# Patient Record
Sex: FEMALE | Race: WHITE | Employment: OTHER | URBAN - METROPOLITAN AREA
[De-identification: names, ages, dates, MRNs, and addresses within clinical notes are randomized per-mention and may not be internally consistent; named-entity substitution may affect disease eponyms.]

---

## 2017-11-27 PROBLEM — R29.90 STROKE-LIKE SYMPTOM: Status: ACTIVE | Noted: 2017-11-27

## 2017-11-28 PROBLEM — I63.211 ACUTE ISCHEMIC VERTEBROBASILAR ARTERY BRAINSTEM STROKE INVOLVING RIGHT-SIDED VESSEL (HCC): Status: ACTIVE | Noted: 2017-11-28

## 2017-11-28 PROBLEM — I63.9 ACUTE ISCHEMIC STROKE (HCC): Status: ACTIVE | Noted: 2017-11-28

## 2017-11-28 PROBLEM — I63.22 ACUTE ISCHEMIC VERTEBROBASILAR ARTERY BRAINSTEM STROKE INVOLVING RIGHT-SIDED VESSEL (HCC): Status: ACTIVE | Noted: 2017-11-28

## 2018-07-16 NOTE — PROGRESS NOTES
Patient and  attended preoperative Total Joint Camp on 7/16/2018. Patient is scheduled to have an elective knee replacement. Patient was educated regarding Disease Process, Medications, Smoking Cessation, Oxygenation, Incentive Spirometry and Deep Breath and Cough, signs and symptoms of postoperative joint infection that include: Fever, Chills, Pain Control, Drainage and Redness, post-op follow up with orthopaedic surgeon, dressing removal, steri strips, ambulatory devices which include a standard walker and cane, bed mobility, correct anatomical alignment, active range of motion, proper transferring technique, incision care, infection prevention measures, non-pharmacologic comfort measures, notification of inadequate pain control measures, pain scale for assessing level of pain, pharmacologic pain management, relaxation techniques. [address] also included patient and family watching an educational total knee replacement video and visual examples of mobility training postoperative by a physical therapist and orthopaedic navigator.

## 2018-07-20 ENCOUNTER — ANESTHESIA EVENT (OUTPATIENT)
Dept: OPERATING ROOM | Age: 78
DRG: 470 | End: 2018-07-20
Payer: MEDICARE

## 2018-07-20 ENCOUNTER — HOSPITAL ENCOUNTER (OUTPATIENT)
Dept: PREADMISSION TESTING | Age: 78
Discharge: HOME | End: 2018-07-20
Payer: MEDICARE

## 2018-07-20 VITALS
WEIGHT: 172 LBS | TEMPERATURE: 97.8 F | DIASTOLIC BLOOD PRESSURE: 76 MMHG | OXYGEN SATURATION: 94 % | RESPIRATION RATE: 18 BRPM | SYSTOLIC BLOOD PRESSURE: 140 MMHG | HEART RATE: 69 BPM | HEIGHT: 62 IN | BODY MASS INDEX: 31.65 KG/M2

## 2018-07-20 DIAGNOSIS — M17.11 OSTEOARTHRITIS OF RIGHT KNEE, UNSPECIFIED OSTEOARTHRITIS TYPE: Primary | ICD-10-CM

## 2018-07-20 LAB
ABO/RH: NORMAL
ANION GAP SERPL CALCULATED.3IONS-SCNC: 9 MMOL/L (ref 7–16)
ANTIBODY SCREEN: NORMAL
APTT: 31.4 SEC (ref 24.5–35.1)
BUN BLDV-MCNC: 9 MG/DL (ref 8–23)
CALCIUM SERPL-MCNC: 8.9 MG/DL (ref 8.6–10.2)
CHLORIDE BLD-SCNC: 104 MMOL/L (ref 98–107)
CO2: 30 MMOL/L (ref 22–29)
CREAT SERPL-MCNC: 0.6 MG/DL (ref 0.5–1)
EKG ATRIAL RATE: 65 BPM
EKG P AXIS: 79 DEGREES
EKG P-R INTERVAL: 190 MS
EKG Q-T INTERVAL: 438 MS
EKG QRS DURATION: 76 MS
EKG QTC CALCULATION (BAZETT): 455 MS
EKG R AXIS: 43 DEGREES
EKG T AXIS: 16 DEGREES
EKG VENTRICULAR RATE: 65 BPM
GFR AFRICAN AMERICAN: >60
GFR NON-AFRICAN AMERICAN: >60 ML/MIN/1.73
GLUCOSE BLD-MCNC: 77 MG/DL (ref 74–109)
HCT VFR BLD CALC: 38.9 % (ref 34–48)
HEMOGLOBIN: 12.8 G/DL (ref 11.5–15.5)
INR BLD: 1.6
MAGNESIUM: 1.8 MG/DL (ref 1.6–2.6)
MCH RBC QN AUTO: 32.2 PG (ref 26–35)
MCHC RBC AUTO-ENTMCNC: 32.9 % (ref 32–34.5)
MCV RBC AUTO: 98 FL (ref 80–99.9)
PDW BLD-RTO: 13.7 FL (ref 11.5–15)
PLATELET # BLD: 177 E9/L (ref 130–450)
PMV BLD AUTO: 10.9 FL (ref 7–12)
POTASSIUM REFLEX MAGNESIUM: 2.8 MMOL/L (ref 3.5–5)
PROTHROMBIN TIME: 17.7 SEC (ref 9.3–12.4)
RBC # BLD: 3.97 E12/L (ref 3.5–5.5)
SODIUM BLD-SCNC: 143 MMOL/L (ref 132–146)
WBC # BLD: 5.6 E9/L (ref 4.5–11.5)

## 2018-07-20 PROCEDURE — 36415 COLL VENOUS BLD VENIPUNCTURE: CPT

## 2018-07-20 PROCEDURE — 85610 PROTHROMBIN TIME: CPT

## 2018-07-20 PROCEDURE — 93005 ELECTROCARDIOGRAM TRACING: CPT | Performed by: ANESTHESIOLOGY

## 2018-07-20 PROCEDURE — 80048 BASIC METABOLIC PNL TOTAL CA: CPT

## 2018-07-20 PROCEDURE — 87081 CULTURE SCREEN ONLY: CPT

## 2018-07-20 PROCEDURE — 86900 BLOOD TYPING SEROLOGIC ABO: CPT

## 2018-07-20 PROCEDURE — 85027 COMPLETE CBC AUTOMATED: CPT

## 2018-07-20 PROCEDURE — 85730 THROMBOPLASTIN TIME PARTIAL: CPT

## 2018-07-20 PROCEDURE — 83735 ASSAY OF MAGNESIUM: CPT

## 2018-07-20 PROCEDURE — 86901 BLOOD TYPING SEROLOGIC RH(D): CPT

## 2018-07-20 PROCEDURE — 93010 ELECTROCARDIOGRAM REPORT: CPT | Performed by: INTERNAL MEDICINE

## 2018-07-20 PROCEDURE — 86850 RBC ANTIBODY SCREEN: CPT

## 2018-07-20 RX ORDER — SODIUM CHLORIDE, SODIUM LACTATE, POTASSIUM CHLORIDE, CALCIUM CHLORIDE 600; 310; 30; 20 MG/100ML; MG/100ML; MG/100ML; MG/100ML
INJECTION, SOLUTION INTRAVENOUS CONTINUOUS
Status: CANCELLED | OUTPATIENT
Start: 2018-07-31

## 2018-07-20 ASSESSMENT — PAIN DESCRIPTION - ORIENTATION: ORIENTATION: RIGHT

## 2018-07-20 ASSESSMENT — PAIN DESCRIPTION - LOCATION: LOCATION: KNEE

## 2018-07-20 ASSESSMENT — PAIN SCALES - GENERAL: PAINLEVEL_OUTOF10: 7

## 2018-07-20 ASSESSMENT — PAIN DESCRIPTION - DESCRIPTORS: DESCRIPTORS: CONSTANT;ACHING

## 2018-07-20 ASSESSMENT — PAIN DESCRIPTION - FREQUENCY: FREQUENCY: CONTINUOUS

## 2018-07-20 ASSESSMENT — PAIN DESCRIPTION - PAIN TYPE: TYPE: CHRONIC PAIN

## 2018-07-20 NOTE — ANESTHESIA PRE PROCEDURE
Department of Anesthesiology  Preprocedure Note       Name:  Debby Castorena   Age:  70 y.o.  :  1940                                          MRN:  [id number]         Date:  2018      Surgeon: Chiqui Wright):  ANETA Daly DO    Procedure: Procedure(s):  TOTAL RIGHT KNEE REPLACEMENT ARTHROPLASTY (JOLYNN)    Medications prior to admission:   Prior to Admission medications    Medication Sig Start Date End Date Taking? Authorizing Provider   NONFORMULARY Urinary spasm drug-does not know name of    Historical Provider, MD   atorvastatin (LIPITOR) 40 MG tablet Take 1 tablet by mouth nightly 17   Subha Walden MD   acetaminophen (TYLENOL) 325 MG tablet Take 500 mg by mouth every 6 hours as needed for Pain Takes 3 tabs in am and 3 tab at night    Historical Provider, MD   warfarin (COUMADIN) 4 MG tablet Take 5 mg by mouth daily     Historical Provider, MD   ergocalciferol (ERGOCALCIFEROL) 83620 UNITS capsule Take 50,000 Units by mouth once a week. Historical Provider, MD   prenatal vitamin (PRENATAL-S) 27-0.8 MG TABS Take 1 tablet by mouth daily. Historical Provider, MD       Current medications:    Current Facility-Administered Medications   Medication Dose Route Frequency Provider Last Rate Last Dose    sodium chloride flush 0.9 % injection 10 mL  10 mL Intravenous 2 times per day ANETA Daly DO        sodium chloride flush 0.9 % injection 10 mL  10 mL Intravenous PRN ANETA Daly DO        tranexamic acid (CYKLOKAPRON) irrigation 1,000 mg  1,000 mg Intra-articular On Call to Jason [address], DO        lactated ringers infusion   Intravenous Continuous Aurelio Vazquez MD 10 mL/hr at 18 0858      ropivacaine (NAROPIN) 0.5% injection             fentaNYL (SUBLIMAZE) 100 MCG/2ML injection             midazolam (VERSED) 2 MG/2ML injection                Allergies:     Allergies   Allergen Reactions    Demerol Hcl [Meperidine]     Iv Dye [Iodides] Hives consumption: 2000                        Date of last liquid consumption: 07/30/18                        Date of last solid food consumption: 07/30/18    BMI:   Wt Readings from Last 3 Encounters:   07/31/18 172 lb (78 kg)   07/20/18 172 lb (78 kg)   11/26/17 178 lb (80.7 kg)     Body mass index is 31.46 kg/m². CBC:   Lab Results   Component Value Date    WBC 5.6 07/20/2018    RBC 3.97 07/20/2018    HGB 12.8 07/20/2018    HCT 38.9 07/20/2018    MCV 98.0 07/20/2018    RDW 13.7 07/20/2018     07/20/2018       CMP:   Lab Results   Component Value Date     07/31/2018    K 4.6 07/31/2018     07/31/2018    CO2 27 07/31/2018    BUN 10 07/31/2018    CREATININE 0.6 07/31/2018    GFRAA >60 07/31/2018    LABGLOM >60 07/31/2018    GLUCOSE 86 07/31/2018    GLUCOSE 99 07/02/2011    PROT 6.8 12/27/2013    CALCIUM 9.0 07/31/2018    BILITOT 0.5 12/27/2013    ALKPHOS 154 12/27/2013    AST 24 12/27/2013    ALT 20 12/27/2013       POC Tests: No results for input(s): POCGLU, POCNA, POCK, POCCL, POCBUN, POCHEMO, POCHCT in the last 72 hours.     Coags:   Lab Results   Component Value Date    PROTIME 11.6 07/31/2018    INR 1.0 07/31/2018    APTT 27.9 07/31/2018       HCG (If Applicable): No results found for: PREGTESTUR, PREGSERUM, HCG, HCGQUANT     ABGs: No results found for: PHART, PO2ART, QAS6CPY, NCO5BRL, BEART, J1QURWVM     Type & Screen (If Applicable):  No results found for: HUGH [facility]    Anesthesia Evaluation  Patient summary reviewed no history of anesthetic complications:   Airway: Mallampati: II  TM distance: >3 FB   Neck ROM: full  Mouth opening: > = 3 FB Dental: normal exam         Pulmonary:Negative Pulmonary ROS breath sounds clear to auscultation                             Cardiovascular:  Exercise tolerance: good (>4 METS),   (+) hypertension: moderate,     (-) past MI and CAD    ECG reviewed  Rhythm: regular  Rate: normal    Stress test reviewed                Neuro/Psych:   (+) CVA: residual

## 2018-07-20 NOTE — PROGRESS NOTES
Faxed bmp, pt/ptt to dr kd fuentes office, called dr Cate Jacinto office, they will give to dr Cate Jacinto., confirmation received. Dr Kalee Tillman notified of potassium level, will repeat bmp day of surgery.

## 2018-07-20 NOTE — PROGRESS NOTES
[address]                                                                                                                     PRE OP INSTRUCTIONS FOR  Aaliyah Almanza        Date: 7/20/2018    Date and time of surgery: 7/31/18   Arrival Time: nurse will call you the night before between 5-7pm   Go to information desk    1. Do not eat or drink anything after 12 midnight prior to surgery. This includes no water, chewing gum, mints or ice chips. 2. Take the following pills with a small sip of water on the morning of Surgery: tylenol if needed     3. Diabetics may take evening dose of insulin but none after midnight. If you feel symptomatic or low blood sugar take 1-2 ounces of apple juice only. 4. Aspirin, Ibuprofen, Advil, Naproxen, Vitamin E and other Anti-inflammatory products should be stopped  before surgery  as directed by your physician. 5. Check with your Doctor regarding stopping Plavix, Coumadin, Lovenox, Fragmin or other blood thinners. 6. Do not smoke,use illicit drugs and do not drink any alcoholic beverages 24 hours prior to surgery. 7. You may brush your teeth and gargle the morning of surgery. DO NOT SWALLOW WATER    8. Please wear simple, loose fitting clothing to the hospital.  Zaire Felder not bring valuables (money, credit cards, checkbooks, etc.) Do not wear any makeup (including no eye makeup) or nail polish on your fingers or toes. 9. DO NOT wear any jewelry or piercings on day of surgery. All body piercing jewelry must be removed. 10. Shower the night before surgery with ___Antibacterial soap /maria de jesus wipes    11. Remember to bring Blood Bank bracelet to the hospital on the day of surgery. 12. If you have a Living Will and Durable Power of  for Healthcare, please bring in a copy.     13. If appropriate bring walker, inspirex, etc...    14. Notify your Surgeon if you develop any illness between now and surgery time, cough, cold, fever, sore throat, nausea, vomiting, etc.  Please notify your surgeon if you experience dizziness, shortness of breath or blurred vision between now & the time of your surgery. 15. If you have ___dentures, they will be removed before going to the OR; we will provide you a container. If you wear ___contact lenses or ___glasses, they will be removed; please bring a case for them. 16. To provide excellent care visitors will be limited to one in the room at any given time. 16. During flu season no children under the age of 15 are permitted in the hospital for the safety of all patients. 18. Other                Please call pre admission testing if you have any further questions.    [address] [facility]     [address]

## 2018-07-21 LAB — MRSA CULTURE ONLY: NORMAL

## 2018-07-23 NOTE — PROGRESS NOTES
Dr fuentes-s office called about lpw potaswsium- of 2.8   Ginny said patient jocelynn appt tomorrow at [phone]

## 2018-07-27 ENCOUNTER — HOSPITAL ENCOUNTER (OUTPATIENT)
Age: 78
Discharge: HOME | End: 2018-07-27
Payer: MEDICARE

## 2018-07-27 LAB
ANION GAP SERPL CALCULATED.3IONS-SCNC: 8 MMOL/L (ref 7–16)
BUN BLDV-MCNC: 7 MG/DL (ref 8–23)
CALCIUM SERPL-MCNC: 9.1 MG/DL (ref 8.6–10.2)
CHLORIDE BLD-SCNC: 107 MMOL/L (ref 98–107)
CO2: 27 MMOL/L (ref 22–29)
CREAT SERPL-MCNC: 0.6 MG/DL (ref 0.5–1)
GFR AFRICAN AMERICAN: >60
GFR NON-AFRICAN AMERICAN: >60 ML/MIN/1.73
GLUCOSE BLD-MCNC: 72 MG/DL (ref 74–109)
POTASSIUM SERPL-SCNC: 4.7 MMOL/L (ref 3.5–5)
SODIUM BLD-SCNC: 142 MMOL/L (ref 132–146)

## 2018-07-27 PROCEDURE — 80048 BASIC METABOLIC PNL TOTAL CA: CPT

## 2018-07-27 PROCEDURE — 36415 COLL VENOUS BLD VENIPUNCTURE: CPT

## 2018-07-31 ENCOUNTER — ANESTHESIA (OUTPATIENT)
Dept: OPERATING ROOM | Age: 78
DRG: 470 | End: 2018-07-31
Payer: MEDICARE

## 2018-07-31 ENCOUNTER — HOSPITAL ENCOUNTER (INPATIENT)
Age: 78
LOS: 3 days | Discharge: NURSING, REHAB OR LONG-TERM CARE | DRG: 470 | End: 2018-08-03
Attending: ORTHOPAEDIC SURGERY | Admitting: ORTHOPAEDIC SURGERY
Payer: MEDICARE

## 2018-07-31 ENCOUNTER — APPOINTMENT (OUTPATIENT)
Dept: GENERAL RADIOLOGY | Age: 78
DRG: 470 | End: 2018-07-31
Attending: ORTHOPAEDIC SURGERY
Payer: MEDICARE

## 2018-07-31 VITALS
OXYGEN SATURATION: 94 % | RESPIRATION RATE: 14 BRPM | TEMPERATURE: 97.7 F | DIASTOLIC BLOOD PRESSURE: 63 MMHG | SYSTOLIC BLOOD PRESSURE: 95 MMHG

## 2018-07-31 DIAGNOSIS — M17.11 PRIMARY OSTEOARTHRITIS OF RIGHT KNEE: Primary | ICD-10-CM

## 2018-07-31 PROBLEM — M17.10 OSTEOARTHRITIS, LOCALIZED, KNEE: Status: ACTIVE | Noted: 2018-07-31

## 2018-07-31 PROBLEM — E78.5 HYPERLIPIDEMIA: Status: ACTIVE | Noted: 2018-07-31

## 2018-07-31 PROBLEM — Z86.718 HX OF BLOOD CLOTS: Status: ACTIVE | Noted: 2018-07-31

## 2018-07-31 LAB
ANION GAP SERPL CALCULATED.3IONS-SCNC: 10 MMOL/L (ref 7–16)
APTT: 27.9 SEC (ref 24.5–35.1)
BUN BLDV-MCNC: 10 MG/DL (ref 8–23)
CALCIUM SERPL-MCNC: 9 MG/DL (ref 8.6–10.2)
CHLORIDE BLD-SCNC: 105 MMOL/L (ref 98–107)
CO2: 27 MMOL/L (ref 22–29)
CREAT SERPL-MCNC: 0.6 MG/DL (ref 0.5–1)
GFR AFRICAN AMERICAN: >60
GFR NON-AFRICAN AMERICAN: >60 ML/MIN/1.73
GLUCOSE BLD-MCNC: 86 MG/DL (ref 74–109)
INR BLD: 1
POTASSIUM REFLEX MAGNESIUM: 4.6 MMOL/L (ref 3.5–5)
PROTHROMBIN TIME: 11.6 SEC (ref 9.3–12.4)
SODIUM BLD-SCNC: 142 MMOL/L (ref 132–146)

## 2018-07-31 PROCEDURE — 6360000002 HC RX W HCPCS: Performed by: NURSE ANESTHETIST, CERTIFIED REGISTERED

## 2018-07-31 PROCEDURE — 6360000002 HC RX W HCPCS: Performed by: ORTHOPAEDIC SURGERY

## 2018-07-31 PROCEDURE — C1776 JOINT DEVICE (IMPLANTABLE): HCPCS | Performed by: ORTHOPAEDIC SURGERY

## 2018-07-31 PROCEDURE — 6370000000 HC RX 637 (ALT 250 FOR IP): Performed by: ORTHOPAEDIC SURGERY

## 2018-07-31 PROCEDURE — G8988 SELF CARE GOAL STATUS: HCPCS

## 2018-07-31 PROCEDURE — 73560 X-RAY EXAM OF KNEE 1 OR 2: CPT

## 2018-07-31 PROCEDURE — 3700000000 HC ANESTHESIA ATTENDED CARE: Performed by: ORTHOPAEDIC SURGERY

## 2018-07-31 PROCEDURE — 6360000002 HC RX W HCPCS: Performed by: STUDENT IN AN ORGANIZED HEALTH CARE EDUCATION/TRAINING PROGRAM

## 2018-07-31 PROCEDURE — 85610 PROTHROMBIN TIME: CPT

## 2018-07-31 PROCEDURE — G8987 SELF CARE CURRENT STATUS: HCPCS

## 2018-07-31 PROCEDURE — 6370000000 HC RX 637 (ALT 250 FOR IP): Performed by: ANESTHESIOLOGY

## 2018-07-31 PROCEDURE — 88311 DECALCIFY TISSUE: CPT

## 2018-07-31 PROCEDURE — 3700000001 HC ADD 15 MINUTES (ANESTHESIA): Performed by: ORTHOPAEDIC SURGERY

## 2018-07-31 PROCEDURE — 7100000001 HC PACU RECOVERY - ADDTL 15 MIN: Performed by: ORTHOPAEDIC SURGERY

## 2018-07-31 PROCEDURE — 88305 TISSUE EXAM BY PATHOLOGIST: CPT

## 2018-07-31 PROCEDURE — 7100000000 HC PACU RECOVERY - FIRST 15 MIN: Performed by: ORTHOPAEDIC SURGERY

## 2018-07-31 PROCEDURE — 2580000003 HC RX 258: Performed by: ORTHOPAEDIC SURGERY

## 2018-07-31 PROCEDURE — 2500000003 HC RX 250 WO HCPCS: Performed by: NURSE ANESTHETIST, CERTIFIED REGISTERED

## 2018-07-31 PROCEDURE — 64447 NJX AA&/STRD FEMORAL NRV IMG: CPT | Performed by: ANESTHESIOLOGY

## 2018-07-31 PROCEDURE — 85730 THROMBOPLASTIN TIME PARTIAL: CPT

## 2018-07-31 PROCEDURE — 3600000015 HC SURGERY LEVEL 5 ADDTL 15MIN: Performed by: ORTHOPAEDIC SURGERY

## 2018-07-31 PROCEDURE — 36415 COLL VENOUS BLD VENIPUNCTURE: CPT

## 2018-07-31 PROCEDURE — 2580000003 HC RX 258: Performed by: ANESTHESIOLOGY

## 2018-07-31 PROCEDURE — 97530 THERAPEUTIC ACTIVITIES: CPT | Performed by: PHYSICAL THERAPIST

## 2018-07-31 PROCEDURE — 97530 THERAPEUTIC ACTIVITIES: CPT

## 2018-07-31 PROCEDURE — 6370000000 HC RX 637 (ALT 250 FOR IP): Performed by: INTERNAL MEDICINE

## 2018-07-31 PROCEDURE — 2709999900 HC NON-CHARGEABLE SUPPLY: Performed by: ORTHOPAEDIC SURGERY

## 2018-07-31 PROCEDURE — 51798 US URINE CAPACITY MEASURE: CPT

## 2018-07-31 PROCEDURE — 2580000003 HC RX 258: Performed by: STUDENT IN AN ORGANIZED HEALTH CARE EDUCATION/TRAINING PROGRAM

## 2018-07-31 PROCEDURE — 97161 PT EVAL LOW COMPLEX 20 MIN: CPT | Performed by: PHYSICAL THERAPIST

## 2018-07-31 PROCEDURE — G8979 MOBILITY GOAL STATUS: HCPCS | Performed by: PHYSICAL THERAPIST

## 2018-07-31 PROCEDURE — 80048 BASIC METABOLIC PNL TOTAL CA: CPT

## 2018-07-31 PROCEDURE — 0SRC0J9 REPLACEMENT OF RIGHT KNEE JOINT WITH SYNTHETIC SUBSTITUTE, CEMENTED, OPEN APPROACH: ICD-10-PCS | Performed by: ORTHOPAEDIC SURGERY

## 2018-07-31 PROCEDURE — 1200000000 HC SEMI PRIVATE

## 2018-07-31 PROCEDURE — 3600000005 HC SURGERY LEVEL 5 BASE: Performed by: ORTHOPAEDIC SURGERY

## 2018-07-31 PROCEDURE — 97165 OT EVAL LOW COMPLEX 30 MIN: CPT

## 2018-07-31 PROCEDURE — C1713 ANCHOR/SCREW BN/BN,TIS/BN: HCPCS | Performed by: ORTHOPAEDIC SURGERY

## 2018-07-31 PROCEDURE — 2500000003 HC RX 250 WO HCPCS: Performed by: ORTHOPAEDIC SURGERY

## 2018-07-31 PROCEDURE — G8978 MOBILITY CURRENT STATUS: HCPCS | Performed by: PHYSICAL THERAPIST

## 2018-07-31 DEVICE — CEMENT BNE 20ML 40GM FULL DOSE PMMA W/O ANTIBIO M VISC: Type: IMPLANTABLE DEVICE | Site: KNEE | Status: FUNCTIONAL

## 2018-07-31 DEVICE — COMPONENT PATELLAR KNEE X3: Type: IMPLANTABLE DEVICE | Site: KNEE | Status: FUNCTIONAL

## 2018-07-31 DEVICE — BASEPLATE TIB SZ 4 UNIV KNEE TRITANIUM TOT STBL CEM: Type: IMPLANTABLE DEVICE | Site: KNEE | Status: FUNCTIONAL

## 2018-07-31 DEVICE — COMPONENT TOT KNEE CAPPED ADV STRYKNEEA] STRYKER CORP]: Type: IMPLANTABLE DEVICE | Site: KNEE | Status: FUNCTIONAL

## 2018-07-31 DEVICE — STEM TIB L50MM DIA12MM KNEE TOT STBL CEM END CAP TRIATHLON: Type: IMPLANTABLE DEVICE | Site: KNEE | Status: FUNCTIONAL

## 2018-07-31 DEVICE — INSERT TIB BEAR SZ 4 THK11MM KNEE X3 POST STBL TRIATHLON: Type: IMPLANTABLE DEVICE | Site: KNEE | Status: FUNCTIONAL

## 2018-07-31 DEVICE — COMPONENT FEM SZ 4 R KNEE POST STBL CEM TRIATHLON: Type: IMPLANTABLE DEVICE | Site: KNEE | Status: FUNCTIONAL

## 2018-07-31 DEVICE — BASEPLATE TIB REV KNEE: Type: IMPLANTABLE DEVICE | Site: KNEE | Status: FUNCTIONAL

## 2018-07-31 DEVICE — COMPONENT PAT DIA27MM THK8MM KNEE X3 SYMMETRIC TRIATHLON: Type: IMPLANTABLE DEVICE | Site: KNEE | Status: FUNCTIONAL

## 2018-07-31 RX ORDER — ASPIRIN 325 MG
325 TABLET, DELAYED RELEASE (ENTERIC COATED) ORAL 2 TIMES DAILY
Qty: 56 TABLET | Refills: 0 | Status: SHIPPED | OUTPATIENT
Start: 2018-07-31 | End: 2018-08-01 | Stop reason: HOSPADM

## 2018-07-31 RX ORDER — GABAPENTIN 100 MG/1
200 CAPSULE ORAL ONCE
Status: COMPLETED | OUTPATIENT
Start: 2018-07-31 | End: 2018-07-31

## 2018-07-31 RX ORDER — SODIUM CHLORIDE 0.9 % (FLUSH) 0.9 %
10 SYRINGE (ML) INJECTION PRN
Status: DISCONTINUED | OUTPATIENT
Start: 2018-07-31 | End: 2018-07-31 | Stop reason: HOSPADM

## 2018-07-31 RX ORDER — FENTANYL CITRATE 50 UG/ML
50 INJECTION, SOLUTION INTRAMUSCULAR; INTRAVENOUS EVERY 5 MIN PRN
Status: DISCONTINUED | OUTPATIENT
Start: 2018-07-31 | End: 2018-08-03 | Stop reason: HOSPADM

## 2018-07-31 RX ORDER — SODIUM CHLORIDE 0.9 % (FLUSH) 0.9 %
10 SYRINGE (ML) INJECTION EVERY 12 HOURS SCHEDULED
Status: DISCONTINUED | OUTPATIENT
Start: 2018-07-31 | End: 2018-07-31 | Stop reason: HOSPADM

## 2018-07-31 RX ORDER — DIPHENHYDRAMINE HCL 25 MG
25 TABLET ORAL EVERY 6 HOURS PRN
Status: DISCONTINUED | OUTPATIENT
Start: 2018-07-31 | End: 2018-08-03 | Stop reason: HOSPADM

## 2018-07-31 RX ORDER — CEFAZOLIN SODIUM 1 G/3ML
INJECTION, POWDER, FOR SOLUTION INTRAMUSCULAR; INTRAVENOUS PRN
Status: DISCONTINUED | OUTPATIENT
Start: 2018-07-31 | End: 2018-07-31 | Stop reason: SDUPTHER

## 2018-07-31 RX ORDER — HYDROCODONE BITARTRATE AND ACETAMINOPHEN 5; 325 MG/1; MG/1
1 TABLET ORAL EVERY 4 HOURS PRN
Status: DISCONTINUED | OUTPATIENT
Start: 2018-07-31 | End: 2018-08-03 | Stop reason: HOSPADM

## 2018-07-31 RX ORDER — FENTANYL CITRATE 50 UG/ML
INJECTION, SOLUTION INTRAMUSCULAR; INTRAVENOUS
Status: COMPLETED
Start: 2018-07-31 | End: 2018-07-31

## 2018-07-31 RX ORDER — OXYCODONE HYDROCHLORIDE 5 MG/1
5 TABLET ORAL ONCE
Status: COMPLETED | OUTPATIENT
Start: 2018-07-31 | End: 2018-07-31

## 2018-07-31 RX ORDER — ERGOCALCIFEROL 1.25 MG/1
50000 CAPSULE ORAL WEEKLY
Status: DISCONTINUED | OUTPATIENT
Start: 2018-08-07 | End: 2018-08-03 | Stop reason: HOSPADM

## 2018-07-31 RX ORDER — BUPIVACAINE HYDROCHLORIDE 7.5 MG/ML
INJECTION, SOLUTION INTRASPINAL PRN
Status: DISCONTINUED | OUTPATIENT
Start: 2018-07-31 | End: 2018-07-31 | Stop reason: SDUPTHER

## 2018-07-31 RX ORDER — ACETAMINOPHEN 500 MG
1000 TABLET ORAL ONCE
Status: COMPLETED | OUTPATIENT
Start: 2018-07-31 | End: 2018-07-31

## 2018-07-31 RX ORDER — ONDANSETRON 2 MG/ML
4 INJECTION INTRAMUSCULAR; INTRAVENOUS
Status: DISCONTINUED | OUTPATIENT
Start: 2018-07-31 | End: 2018-07-31 | Stop reason: HOSPADM

## 2018-07-31 RX ORDER — SODIUM CHLORIDE 0.9 % (FLUSH) 0.9 %
10 SYRINGE (ML) INJECTION PRN
Status: DISCONTINUED | OUTPATIENT
Start: 2018-07-31 | End: 2018-08-03 | Stop reason: HOSPADM

## 2018-07-31 RX ORDER — MORPHINE SULFATE 4 MG/ML
4 INJECTION, SOLUTION INTRAMUSCULAR; INTRAVENOUS
Status: DISCONTINUED | OUTPATIENT
Start: 2018-07-31 | End: 2018-08-03 | Stop reason: HOSPADM

## 2018-07-31 RX ORDER — MIDAZOLAM HYDROCHLORIDE 1 MG/ML
INJECTION INTRAMUSCULAR; INTRAVENOUS
Status: COMPLETED
Start: 2018-07-31 | End: 2018-07-31

## 2018-07-31 RX ORDER — MORPHINE SULFATE 0.5 MG/ML
INJECTION, SOLUTION EPIDURAL; INTRATHECAL; INTRAVENOUS PRN
Status: DISCONTINUED | OUTPATIENT
Start: 2018-07-31 | End: 2018-07-31 | Stop reason: SDUPTHER

## 2018-07-31 RX ORDER — EPHEDRINE SULFATE/0.9% NACL/PF 50 MG/5 ML
SYRINGE (ML) INTRAVENOUS PRN
Status: DISCONTINUED | OUTPATIENT
Start: 2018-07-31 | End: 2018-07-31 | Stop reason: SDUPTHER

## 2018-07-31 RX ORDER — SODIUM CHLORIDE, SODIUM LACTATE, POTASSIUM CHLORIDE, CALCIUM CHLORIDE 600; 310; 30; 20 MG/100ML; MG/100ML; MG/100ML; MG/100ML
INJECTION, SOLUTION INTRAVENOUS CONTINUOUS
Status: DISCONTINUED | OUTPATIENT
Start: 2018-07-31 | End: 2018-07-31

## 2018-07-31 RX ORDER — MORPHINE SULFATE 2 MG/ML
2 INJECTION, SOLUTION INTRAMUSCULAR; INTRAVENOUS
Status: DISCONTINUED | OUTPATIENT
Start: 2018-07-31 | End: 2018-08-03 | Stop reason: HOSPADM

## 2018-07-31 RX ORDER — HYDROCODONE BITARTRATE AND ACETAMINOPHEN 5; 325 MG/1; MG/1
1 TABLET ORAL EVERY 6 HOURS PRN
Qty: 28 TABLET | Refills: 0 | Status: SHIPPED | OUTPATIENT
Start: 2018-07-31 | End: 2018-08-07

## 2018-07-31 RX ORDER — PROPOFOL 10 MG/ML
INJECTION, EMULSION INTRAVENOUS CONTINUOUS PRN
Status: DISCONTINUED | OUTPATIENT
Start: 2018-07-31 | End: 2018-07-31 | Stop reason: SDUPTHER

## 2018-07-31 RX ORDER — BUPIVACAINE HYDROCHLORIDE 2.5 MG/ML
INJECTION, SOLUTION EPIDURAL; INFILTRATION; INTRACAUDAL PRN
Status: DISCONTINUED | OUTPATIENT
Start: 2018-07-31 | End: 2018-07-31 | Stop reason: HOSPADM

## 2018-07-31 RX ORDER — CELECOXIB 100 MG/1
200 CAPSULE ORAL ONCE
Status: COMPLETED | OUTPATIENT
Start: 2018-07-31 | End: 2018-07-31

## 2018-07-31 RX ORDER — DEXTROSE AND SODIUM CHLORIDE 5; .45 G/100ML; G/100ML
INJECTION, SOLUTION INTRAVENOUS CONTINUOUS
Status: DISCONTINUED | OUTPATIENT
Start: 2018-07-31 | End: 2018-08-02

## 2018-07-31 RX ORDER — ONDANSETRON 2 MG/ML
4 INJECTION INTRAMUSCULAR; INTRAVENOUS EVERY 6 HOURS PRN
Status: DISCONTINUED | OUTPATIENT
Start: 2018-07-31 | End: 2018-08-03 | Stop reason: HOSPADM

## 2018-07-31 RX ORDER — PROMETHAZINE HYDROCHLORIDE 25 MG/ML
6.25 INJECTION, SOLUTION INTRAMUSCULAR; INTRAVENOUS
Status: DISCONTINUED | OUTPATIENT
Start: 2018-07-31 | End: 2018-07-31 | Stop reason: HOSPADM

## 2018-07-31 RX ORDER — PRENATAL WITH FERROUS FUM AND FOLIC ACID 3080; 920; 120; 400; 22; 1.84; 3; 20; 10; 1; 12; 200; 27; 25; 2 [IU]/1; [IU]/1; MG/1; [IU]/1; MG/1; MG/1; MG/1; MG/1; MG/1; MG/1; UG/1; MG/1; MG/1; MG/1; MG/1
1 TABLET ORAL DAILY
Status: DISCONTINUED | OUTPATIENT
Start: 2018-07-31 | End: 2018-08-03 | Stop reason: HOSPADM

## 2018-07-31 RX ORDER — DOCUSATE SODIUM 100 MG/1
100 CAPSULE, LIQUID FILLED ORAL 2 TIMES DAILY
Status: DISCONTINUED | OUTPATIENT
Start: 2018-07-31 | End: 2018-08-03 | Stop reason: HOSPADM

## 2018-07-31 RX ORDER — ATORVASTATIN CALCIUM 40 MG/1
40 TABLET, FILM COATED ORAL NIGHTLY
Status: DISCONTINUED | OUTPATIENT
Start: 2018-07-31 | End: 2018-08-03 | Stop reason: HOSPADM

## 2018-07-31 RX ORDER — WARFARIN SODIUM 5 MG/1
5 TABLET ORAL DAILY
Status: DISCONTINUED | OUTPATIENT
Start: 2018-07-31 | End: 2018-08-02 | Stop reason: CLARIF

## 2018-07-31 RX ORDER — MORPHINE SULFATE 2 MG/ML
2 INJECTION, SOLUTION INTRAMUSCULAR; INTRAVENOUS EVERY 5 MIN PRN
Status: DISCONTINUED | OUTPATIENT
Start: 2018-07-31 | End: 2018-07-31 | Stop reason: HOSPADM

## 2018-07-31 RX ORDER — HYDROCODONE BITARTRATE AND ACETAMINOPHEN 5; 325 MG/1; MG/1
2 TABLET ORAL EVERY 4 HOURS PRN
Status: DISCONTINUED | OUTPATIENT
Start: 2018-07-31 | End: 2018-08-03 | Stop reason: HOSPADM

## 2018-07-31 RX ORDER — ROPIVACAINE HYDROCHLORIDE 5 MG/ML
INJECTION, SOLUTION EPIDURAL; INFILTRATION; PERINEURAL
Status: DISPENSED
Start: 2018-07-31 | End: 2018-07-31

## 2018-07-31 RX ORDER — MIDAZOLAM HYDROCHLORIDE 1 MG/ML
1 INJECTION INTRAMUSCULAR; INTRAVENOUS EVERY 5 MIN PRN
Status: DISCONTINUED | OUTPATIENT
Start: 2018-07-31 | End: 2018-08-03 | Stop reason: HOSPADM

## 2018-07-31 RX ORDER — FENTANYL CITRATE 50 UG/ML
INJECTION, SOLUTION INTRAMUSCULAR; INTRAVENOUS PRN
Status: DISCONTINUED | OUTPATIENT
Start: 2018-07-31 | End: 2018-07-31 | Stop reason: SDUPTHER

## 2018-07-31 RX ORDER — SODIUM CHLORIDE 0.9 % (FLUSH) 0.9 %
10 SYRINGE (ML) INJECTION EVERY 12 HOURS SCHEDULED
Status: DISCONTINUED | OUTPATIENT
Start: 2018-07-31 | End: 2018-08-03 | Stop reason: HOSPADM

## 2018-07-31 RX ORDER — ROPIVACAINE HYDROCHLORIDE 5 MG/ML
30 INJECTION, SOLUTION EPIDURAL; INFILTRATION; PERINEURAL ONCE
Status: DISCONTINUED | OUTPATIENT
Start: 2018-07-31 | End: 2018-08-03 | Stop reason: HOSPADM

## 2018-07-31 RX ORDER — MIDAZOLAM HYDROCHLORIDE 1 MG/ML
INJECTION INTRAMUSCULAR; INTRAVENOUS PRN
Status: DISCONTINUED | OUTPATIENT
Start: 2018-07-31 | End: 2018-07-31 | Stop reason: SDUPTHER

## 2018-07-31 RX ADMIN — DOCUSATE SODIUM 100 MG: 100 CAPSULE, LIQUID FILLED ORAL at 20:34

## 2018-07-31 RX ADMIN — CEFAZOLIN SODIUM 2 G: 2 SOLUTION INTRAVENOUS at 22:30

## 2018-07-31 RX ADMIN — SODIUM CHLORIDE, POTASSIUM CHLORIDE, SODIUM LACTATE AND CALCIUM CHLORIDE: 600; 310; 30; 20 INJECTION, SOLUTION INTRAVENOUS at 11:00

## 2018-07-31 RX ADMIN — ENOXAPARIN SODIUM 30 MG: 30 INJECTION SUBCUTANEOUS at 20:34

## 2018-07-31 RX ADMIN — DEXTROSE AND SODIUM CHLORIDE: 5; 450 INJECTION, SOLUTION INTRAVENOUS at 15:50

## 2018-07-31 RX ADMIN — CEFAZOLIN 2000 MG: 330 INJECTION, POWDER, FOR SOLUTION INTRAMUSCULAR; INTRAVENOUS at 10:45

## 2018-07-31 RX ADMIN — GABAPENTIN 200 MG: 100 CAPSULE ORAL at 09:02

## 2018-07-31 RX ADMIN — WARFARIN SODIUM 5 MG: 5 TABLET ORAL at 17:29

## 2018-07-31 RX ADMIN — BUPIVACAINE HYDROCHLORIDE IN DEXTROSE 1.8 ML: 7.5 INJECTION, SOLUTION SUBARACHNOID at 10:44

## 2018-07-31 RX ADMIN — ATORVASTATIN CALCIUM 40 MG: 40 TABLET, FILM COATED ORAL at 20:34

## 2018-07-31 RX ADMIN — ACETAMINOPHEN 1000 MG: 500 TABLET, FILM COATED ORAL at 09:02

## 2018-07-31 RX ADMIN — SODIUM CHLORIDE, POTASSIUM CHLORIDE, SODIUM LACTATE AND CALCIUM CHLORIDE: 600; 310; 30; 20 INJECTION, SOLUTION INTRAVENOUS at 10:27

## 2018-07-31 RX ADMIN — CELECOXIB 200 MG: 100 CAPSULE ORAL at 09:02

## 2018-07-31 RX ADMIN — DIPHENHYDRAMINE HCL 25 MG: 25 TABLET ORAL at 17:29

## 2018-07-31 RX ADMIN — Medication 10 MG: at 11:25

## 2018-07-31 RX ADMIN — SODIUM CHLORIDE, POTASSIUM CHLORIDE, SODIUM LACTATE AND CALCIUM CHLORIDE: 600; 310; 30; 20 INJECTION, SOLUTION INTRAVENOUS at 08:58

## 2018-07-31 RX ADMIN — PROPOFOL 50 MCG/KG/MIN: 10 INJECTION, EMULSION INTRAVENOUS at 10:50

## 2018-07-31 RX ADMIN — Medication 5 MG: at 11:33

## 2018-07-31 RX ADMIN — MIDAZOLAM 1 MG: 1 INJECTION INTRAMUSCULAR; INTRAVENOUS at 10:48

## 2018-07-31 RX ADMIN — OXYCODONE HYDROCHLORIDE 5 MG: 5 TABLET ORAL at 09:02

## 2018-07-31 RX ADMIN — Medication 20 MG: at 11:05

## 2018-07-31 RX ADMIN — ONDANSETRON 4 MG: 2 INJECTION INTRAMUSCULAR; INTRAVENOUS at 15:50

## 2018-07-31 RX ADMIN — Medication 2 G: at 14:19

## 2018-07-31 RX ADMIN — Medication 10 MG: at 12:35

## 2018-07-31 RX ADMIN — FENTANYL CITRATE 50 MCG: 50 INJECTION, SOLUTION INTRAMUSCULAR; INTRAVENOUS at 10:48

## 2018-07-31 RX ADMIN — Medication 5 MG: at 11:29

## 2018-07-31 RX ADMIN — MORPHINE SULFATE 0.15 MG: 0.5 INJECTION, SOLUTION EPIDURAL; INTRATHECAL; INTRAVENOUS at 10:44

## 2018-07-31 ASSESSMENT — PULMONARY FUNCTION TESTS
PIF_VALUE: 0
PIF_VALUE: 1
PIF_VALUE: 0
PIF_VALUE: 1
PIF_VALUE: 0
PIF_VALUE: 1
PIF_VALUE: 1
PIF_VALUE: 0
PIF_VALUE: 1
PIF_VALUE: 0
PIF_VALUE: 1
PIF_VALUE: 0
PIF_VALUE: 1
PIF_VALUE: 0
PIF_VALUE: 1
PIF_VALUE: 1
PIF_VALUE: 0
PIF_VALUE: 1
PIF_VALUE: 0

## 2018-07-31 ASSESSMENT — PAIN SCALES - GENERAL
PAINLEVEL_OUTOF10: 0

## 2018-07-31 ASSESSMENT — PAIN - FUNCTIONAL ASSESSMENT: PAIN_FUNCTIONAL_ASSESSMENT: 0-10

## 2018-07-31 ASSESSMENT — PAIN DESCRIPTION - DESCRIPTORS: DESCRIPTORS: CONSTANT;ACHING

## 2018-07-31 NOTE — ANESTHESIA PROCEDURE NOTES
Peripheral Block    Patient location during procedure: PACU  Staffing  Anesthesiologist: Machelle Molina  Performed: anesthesiologist   Preanesthetic Checklist  Completed: patient identified, site marked, surgical consent, pre-op evaluation, timeout performed, IV checked, risks and benefits discussed, monitors and equipment checked, anesthesia consent given, oxygen available and patient being monitored  Peripheral Block  Patient position: supine  Prep: ChloraPrep  Patient monitoring: cardiac monitor, continuous pulse ox, frequent blood pressure checks and IV access  Block type: Femoral  Laterality: right  Injection technique: single-shot  Procedures: ultrasound guided  Local infiltration: lidocaine  Infiltration strength: 1 %  Adductor canal  Provider prep: mask and sterile gloves  Local infiltration: lidocaine  Needle  Needle type: combined needle/nerve stimulator   Needle gauge: 22 G  Needle length: 5 cm  Needle localization: ultrasound guidance  Assessment  Injection assessment: negative aspiration for heme, no paresthesia on injection and local visualized surrounding nerve on ultrasound  Slow fractionated injection: yes  Hemodynamics: stable  Reason for block: post-op pain management

## 2018-07-31 NOTE — PROGRESS NOTES
Occupational Therapy  Occupational Therapy Initial Assessment    Date:2018  Patient Name: Debby Castorena  MRN: [id number]  : 1940  ROOM #: 0320/0320-01    Placement Recommendation: Subacute    Equipment Prescriptions Needed: TBD at rehab    [facility]   AM-PAC Daily Activity Inpatient   How much help for putting on and taking off regular lower body clothing?: Total  How much help for Bathing?: A Lot  How much help for Toileting?: A Lot  How much help for putting on and taking off regular upper body clothing?: A Lot  How much help for taking care of personal grooming?: A Lot  How much help for eating meals?: None  AM-PAC Inpatient Daily Activity Raw Score: 13  AM-PAC Inpatient ADL T-Scale Score : 32.03  ADL Inpatient CMS 0-100% Score: 63.03  ADL Inpatient CMS G-Code Modifier : CL    Diagnosis:   1. Primary osteoarthritis of right knee      Past Medical History:   Past Medical History:   Diagnosis Date    Arthritis     [facility]     DVT (deep vein thrombosis) in pregnancy ([facility]     DVT of lower extremity, bilateral (HCC)     Hx of blood clots     Hyperlipidemia     Hypertension     cured    Nephrolithiasis          The admitting diagnosis and active problem list as listed above have been reviewed prior to the initiation of this evaluation. Nursing cleared the patient for evaluation. Patient is agreeable to evaluation. Precautions: falls, full weight bearing: R LE, abdomin is itchy, diaphoretic   Pain Scale: Numeric Rate: no c/o pain during evaluation.; Nursing notified. Social history: with family : spouse (currently in subacute rehab after having hip surgery last week)     Home architecture: single family home, 1 story, 2 steps to enter with no rail, 2 step inside the home with rail, tub shower.    PLOF: independent with BADL and IADL, ambulated with no device   Equipment owned: wheeled walker, straight cane, wheelchair, shower chair, grab bars, weight bearing status, bed mobility with assistance to guide lower extremity off bed, sitting balance at EOB for 15 minutes with supervision, pt was diaphoretic and itchy, pt returned to bed, maximal assist x 2 to scoot up in bed. Assisted in rolling to change chux pads. Education provided for proper positioning of lower extremity in bed to prevent contractures. All needs within reach. Bed alarm on. Nursing aware of nausea and administered nausea medication. Rehab Potential: good   Plan of care: Patient will be seen by OT 2-4 times a week for therapeutic activity, ADL re-training, bed mobility, functional transfers, functional mobility, safety and fall prevention, balance and endurance activities, instruction in energy conservation principles, and patient/family education. Patient and/or family understands diagnosis, prognosis, education and plan of care. Pt/family verbalized understanding.      Time Code treatment minutes: Ke OTR/L #[id number]

## 2018-07-31 NOTE — OP NOTE
was mobilized forward and the remainder of cruciate's were removed and the meniscus cartilages were removed. The tibia was set up for a perpendicular cut to the long axis. Cutting block was pinned the bone cut was made nice smooth cut noted. The tensiometer is used to check flexion and extension gaps and medial and lateral balance. Additional 2 mm were cut from the distal femur and then the chamfers and anterior cut were test up to balance out flexion and extension and then the varus valgus tibial cutting block was applied to allow us to cut a very slight amount of additional lateral bone off the tibia to balance out medial and lateral. After this the tensiometer showed good balance all around. The tibia was prepared with the size 4 baseplate in the 11 mm bearing and the femoral trial component was applied. Good balance and good range of motion was noted extension 0 good alignment medial and lateral and good tension in flexion as allowed by the patient's obese body habitus. The soft bone of the tibia was prepared for the fin baseplate with cemented stem and then the femur was cut for the box cut on the posterior stabilized component. The trials were removed and the patella was prepared for a 27 mm symmetric patellar bearing. The knee was thoroughly irrigated this point. Bone plug placed and distal femoral canal and local anesthetic was injected surrounding soft tissues for postoperative pain control. The cement was mixed semi-viscus and all components were cemented with excess cement removed during initial impaction and after partial set up. The tibial bearing was snapped securely in position knee was thoroughly irrigated irrigated as the cement set up. There is placed in the joint and the capsule was closed with 0 Vicryl figure-of-eight sutures. Patellofemoral alignment and tension were good. Subcu tissue was irrigated and then closed with 2-0 Vicryl and skin was closed with Steri-Strips.  Aquasol AG and a bulky Lopez-type dressing was applied tourniquet was deflated with good circulation turned lower extremity. Patient was and taken recovery in stable condition      GROSS PATHOLOGY: Advanced degeneration right knee with varus malalignment and medial contracture chondral erosion with marginal bony hypertrophy and synovial hypertrophy. COMPONENTS USED :  [employer] triathlon primary cemented knee. Size 4 universal tibial baseplate with 12 mm x 50 mm cemented stem. 11 mm posterior stabilized bearing. Size 4 right posterior stabilized cemented femoral component. 27 mm symmetric patellar bearing. [facility] Simplex P bone cement.      Electronically signed by Gallo Oneill DO on 7/31/18 at 12:50 PM

## 2018-07-31 NOTE — CONSULTS
Inpatient consult to Internal Medicine  Consult performed by: Levy Gonzalez ordered by: Jim Ames             Consult Note      REASON FOR CONSULTATION:  Medical Management    ATTENDING/ADMITTING PHYSICIAN:ANETA Miller DO     History Obtained From:  patient, electronic medical record    HISTORY OF PRESENT ILLNESS:    The patient is a 70 y.o. female with a hx of DVT who presents for elective right TKR. Pt also has a hx of gastric bypass surgery about 10 years and is currently without complaints post op except for nausea and a rash around site of epidural.  Pt seen postop and currently denies any other complaints of cp, sob, diarrhea, constipation, weakness, dizziness, fevers or chills.     Past Medical History:    Past Medical History:   Diagnosis Date    Arthritis     Cerebral artery occlusion with cerebral infarction ([address]     DVT (deep vein thrombosis) in pregnancy ([address]     DVT of lower extremity, bilateral ([address]     Hx of blood clots     Hyperlipidemia     Hypertension     cured    Nephrolithiasis      Past Surgical History:    Past Surgical History:   Procedure Laterality Date    ABDOMEN SURGERY  2007, 2008    tummy tuck x2    BLADDER SUSPENSION  2011    CHOLECYSTECTOMY, OPEN  3/21/2013    open cholecystectomy with intropertive cholangiogram    COLONOSCOPY      ENDOSCOPY, COLON, DIAGNOSTIC      FOOT SURGERY      GASTRIC BYPASS SURGERY  March, 2006    [address]    HYSTERECTOMY  42 years ago     Medications Prior to Admission:    Prescriptions Prior to Admission: NONFORMULARY, Urinary spasm drug-does not know name of  atorvastatin (LIPITOR) 40 MG tablet, Take 1 tablet by mouth nightly  acetaminophen (TYLENOL) 325 MG tablet, Take 500 mg by mouth every 6 hours as needed for Pain Takes 3 tabs in am and 3 tab at night  warfarin (COUMADIN) 4 MG tablet, Take 5 mg by mouth daily   ergocalciferol (ERGOCALCIFEROL) 26264 UNITS capsule, Take 50,000 Units by mouth once a 31.46 kg/m²     General:  Awake, alert, oriented X 3. Well developed, well nourished, well groomed. No apparent distress. HEENT:  Normocephalic, atraumatic. Pupils equal, round, reactive to light. No scleral icterus. No conjunctival injection. Normal lips, teeth, and gums. No nasal discharge. Neck:  Supple  Heart:  RRR, no murmurs, gallops, rubs  Lungs:  CTA bilaterally, bilat symmetrical expansion, no wheeze, rales, or rhonchi  Abdomen: Bowel sounds present, soft, nontender, no masses, no organomegaly, no peritoneal signs  Extremities:  No clubbing, cyanosis, or edema, R Knee Dressings  Skin:  Warm and dry, no open lesions, macular rash on lumbar dermatome  Neuro:  Cranial nerves 2-12 intact, no focal deficits  Breast: deferred  Rectal: deferred  Genitalia:  deferred    LABS:  Lab Results   Component Value Date    WBC 5.6 07/20/2018    RBC 3.97 07/20/2018    HGB 12.8 07/20/2018    HCT 38.9 07/20/2018    MCV 98.0 07/20/2018    MCH 32.2 07/20/2018    MCHC 32.9 07/20/2018    RDW 13.7 07/20/2018     07/20/2018    MPV 10.9 07/20/2018     Lab Results   Component Value Date     07/31/2018    K 4.6 07/31/2018     07/31/2018    CO2 27 07/31/2018    BUN 10 07/31/2018    CREATININE 0.6 07/31/2018    GFRAA >60 07/31/2018    LABGLOM >60 07/31/2018    GLUCOSE 86 07/31/2018    GLUCOSE 99 07/02/2011    PROT 6.8 12/27/2013    LABALBU 4.0 12/27/2013    LABALBU 4.1 06/24/2011    CALCIUM 9.0 07/31/2018    BILITOT 0.5 12/27/2013    ALKPHOS 154 12/27/2013    AST 24 12/27/2013    ALT 20 12/27/2013     Lab Results   Component Value Date    PROTIME 11.6 07/31/2018    INR 1.0 07/31/2018     No results for input(s): CKTOTAL, CKMB, CKMBINDEX, TROPONINI in the last 72 hours.   Lab Results   Component Value Date    NITRU POSITIVE 02/19/2017    COLORU Yellow 02/19/2017    PHUR 5.5 02/19/2017    WBCUA 2-5 02/19/2017    WBCUA 10-20 07/29/2011    RBCUA 0-1 02/19/2017    RBCUA 0-1 12/27/2013    BACTERIA MANY 02/19/2017 Problems:    * No resolved hospital problems.  *    PLAN:  Resume coumadin  Dvt prophylaxis until INR therapeutic  PT/OT  Follow labs/vitals  Benadryl for post op rash     Tripp Barton MD  4:46 PM  7/31/2018

## 2018-07-31 NOTE — PROGRESS NOTES
Physical Therapy    0320/0320-01    PHYSICAL THERAPY INITIAL EVALUATION  Tentative placement recommendation:  inpatient rehab and pending progress and patient's family's ability to assist.    Equipment prescriptions needed:   none    Plan of care: Patient will be seen twice daily for therapeutic exercise, functional retraining, endurance activities, balance exercises, family and patient education. AM-[facility] Mobility Inpatient   How much difficulty turning over in bed?: A Little  How much difficulty sitting down on / standing up from a chair with arms?: A Lot  How much difficulty moving from lying on back to sitting on side of bed?: A Lot  How much help from another person moving to and from a bed to a chair?: A Lot  How much help from another person needed to walk in hospital room?: A Lot  How much help from another person for climbing 3-5 steps with a railing?: Total  AM-PAC Inpatient Mobility Raw Score : 12  AM-PAC Inpatient T-Scale Score : 35.33  Mobility Inpatient CMS 0-100% Score: 68.66  Mobility Inpatient CMS G-Code Modifier : CL      Order: evaluate and treat  Diagnosis:    1.  Primary osteoarthritis of right knee     s/p total knee   replacement; Dr. Lorin Nguyen    Past medical history:       Diagnosis Date    Arthritis     Cerebral artery occlusion with cerebral infarction ([address]     DVT (deep vein thrombosis) in pregnancy ([address]     DVT of lower extremity, bilateral ([address]     Hx of blood clots     Hyperlipidemia     Hypertension     cured    Nephrolithiasis    ;      Procedure Laterality Date    ABDOMEN SURGERY  2007, 2008    tummy tuck x2    BLADDER SUSPENSION  2011    CHOLECYSTECTOMY, OPEN  3/21/2013    open cholecystectomy with intropertive cholangiogram    COLONOSCOPY      ENDOSCOPY, COLON, DIAGNOSTIC      FOOT SURGERY      GASTRIC BYPASS SURGERY  March, [place]    [address]    HYSTERECTOMY  42 years ago       The admitting diagnosis and active problem list as listed above have 10%  Increase strength in affected muscle groups by 1/3 grade  Increase balance to allow for improvement towards functional goals. Increase endurance to allow for improvement towards functional goals.

## 2018-07-31 NOTE — ANESTHESIA PROCEDURE NOTES
Spinal Block    Patient location during procedure: OR  Start time: 7/31/2018 9:44 AM  Reason for block: primary anesthetic and at surgeon's request  Preanesthetic Checklist  Completed: patient identified, site marked, surgical consent, pre-op evaluation, timeout performed, IV checked, risks and benefits discussed, monitors and equipment checked, anesthesia consent given, oxygen available and patient being monitored  Spinal Block  Patient position: sitting  Prep: Betadine and site prepped and draped  Patient monitoring: cardiac monitor, continuous pulse ox, continuous capnometry and frequent blood pressure checks  Approach: midline  Provider prep: mask, sterile gloves and sterile gown  Needle  Needle type: Pencan   Needle gauge: 25 G  Needle length: 3.5 in  Assessment  Sensory level: T6  Swirl obtained: Yes  CSF: clear  Attempts: 1  Hemodynamics: stable

## 2018-07-31 NOTE — H&P
History and Physical      CHIEF COMPLAINT:  R knee pain    HISTORY OF PRESENT ILLNESS:          Past Medical History:        Diagnosis Date    Arthritis     Cerebral artery occlusion with cerebral infarction ([address]     DVT (deep vein thrombosis) in pregnancy ([address]     DVT of lower extremity, bilateral ([address]     Hx of blood clots     Hyperlipidemia     Hypertension     cured    Nephrolithiasis      Past Surgical History:        Procedure Laterality Date    ABDOMEN SURGERY  2007, 2008    tummy tuck x2    BLADDER SUSPENSION  2011    CHOLECYSTECTOMY, OPEN  3/21/2013    open cholecystectomy with intropertive cholangiogram    COLONOSCOPY      ENDOSCOPY, COLON, DIAGNOSTIC      FOOT SURGERY      GASTRIC BYPASS SURGERY  March, 2006    [facility]    HYSTERECTOMY  42 years ago     Social History:    TOBACCO:   reports that she has never smoked. She has never used smokeless tobacco.  ETOH:   reports that she does not drink alcohol. DRUGS:   reports that she does not use drugs. Family History:   Family History   Problem Relation Age of Onset    Cancer Mother         leukemia     Medications Prior to Admission:  No prescriptions prior to admission. Allergies:  Demerol hcl [meperidine] and Iv dye [iodides]    CONSTITUTIONAL:  negative for  chills and anorexia  HEENT:  negative for  tinnitus  RESPIRATORY:  negative for  dyspnea and cyanosis  CARDIOVASCULAR:  negative  GASTROINTESTINAL:  negative for vomiting and hematemesis  GENITOURINARY:  negative for hematuria  ENDOCRINE:  negative for tremor  MUSCULOSKELETAL:  positive for  arthralgias, stiff joints and decreased range of motion  NEUROLOGICAL:  negative for seizures and syncope  BEHAVIOR/PSYCH:  negative for agitated and anxiety    PHYSICAL EXAM:  General appearance:  awake, alert, cooperative, no apparent distress, and appears stated age  Neurologic:  Awake, alert, oriented to name, place and time. Cranial nerves II-XII are grossly intact.

## 2018-08-01 LAB
HCT VFR BLD CALC: 35.2 % (ref 34–48)
HEMOGLOBIN: 11.3 G/DL (ref 11.5–15.5)

## 2018-08-01 PROCEDURE — 97530 THERAPEUTIC ACTIVITIES: CPT

## 2018-08-01 PROCEDURE — 6370000000 HC RX 637 (ALT 250 FOR IP): Performed by: ORTHOPAEDIC SURGERY

## 2018-08-01 PROCEDURE — 97535 SELF CARE MNGMENT TRAINING: CPT

## 2018-08-01 PROCEDURE — 6360000002 HC RX W HCPCS: Performed by: ORTHOPAEDIC SURGERY

## 2018-08-01 PROCEDURE — 97110 THERAPEUTIC EXERCISES: CPT

## 2018-08-01 PROCEDURE — 36415 COLL VENOUS BLD VENIPUNCTURE: CPT

## 2018-08-01 PROCEDURE — 2580000003 HC RX 258: Performed by: ORTHOPAEDIC SURGERY

## 2018-08-01 PROCEDURE — 1200000000 HC SEMI PRIVATE

## 2018-08-01 PROCEDURE — 85014 HEMATOCRIT: CPT

## 2018-08-01 PROCEDURE — 94150 VITAL CAPACITY TEST: CPT

## 2018-08-01 PROCEDURE — 85018 HEMOGLOBIN: CPT

## 2018-08-01 RX ADMIN — ATORVASTATIN CALCIUM 40 MG: 40 TABLET, FILM COATED ORAL at 19:59

## 2018-08-01 RX ADMIN — ENOXAPARIN SODIUM 30 MG: 30 INJECTION SUBCUTANEOUS at 19:59

## 2018-08-01 RX ADMIN — VITAMIN A ACETATE, .BETA.-CAROTENE, ASCORBIC ACID, CHOLECALCIFEROL, .ALPHA.-TOCOPHEROL ACETATE, DL-, THIAMINE MONONITRATE, RIBOFLAVIN, NIACINAMIDE, PYRIDOXINE HYDROCHLORIDE, FOLIC ACID, CYANOCOBALAMIN, CALCIUM CARBONATE, FERROUS FUMARATE, ZINC OXIDE, AND CUPRIC OXIDE 1 TABLET: 2000; 2000; 120; 400; 22; 1.84; 3; 20; 10; 1; 12; 200; 27; 25; 2 TABLET ORAL at 09:06

## 2018-08-01 RX ADMIN — Medication 10 ML: at 19:59

## 2018-08-01 RX ADMIN — DOCUSATE SODIUM 100 MG: 100 CAPSULE, LIQUID FILLED ORAL at 19:59

## 2018-08-01 RX ADMIN — HYDROCODONE BITARTRATE AND ACETAMINOPHEN 2 TABLET: 5; 325 TABLET ORAL at 12:41

## 2018-08-01 RX ADMIN — HYDROCODONE BITARTRATE AND ACETAMINOPHEN 2 TABLET: 5; 325 TABLET ORAL at 08:02

## 2018-08-01 RX ADMIN — WARFARIN SODIUM 5 MG: 5 TABLET ORAL at 09:07

## 2018-08-01 RX ADMIN — HYDROCODONE BITARTRATE AND ACETAMINOPHEN 2 TABLET: 5; 325 TABLET ORAL at 18:14

## 2018-08-01 RX ADMIN — HYDROCODONE BITARTRATE AND ACETAMINOPHEN 2 TABLET: 5; 325 TABLET ORAL at 01:48

## 2018-08-01 RX ADMIN — MORPHINE SULFATE 4 MG: 4 INJECTION INTRAVENOUS at 19:59

## 2018-08-01 RX ADMIN — DOCUSATE SODIUM 100 MG: 100 CAPSULE, LIQUID FILLED ORAL at 09:06

## 2018-08-01 RX ADMIN — MORPHINE SULFATE 4 MG: 4 INJECTION INTRAVENOUS at 13:40

## 2018-08-01 RX ADMIN — ENOXAPARIN SODIUM 30 MG: 30 INJECTION SUBCUTANEOUS at 09:06

## 2018-08-01 RX ADMIN — CEFAZOLIN SODIUM 2 G: 2 SOLUTION INTRAVENOUS at 06:20

## 2018-08-01 ASSESSMENT — PAIN SCALES - GENERAL
PAINLEVEL_OUTOF10: 3
PAINLEVEL_OUTOF10: 10
PAINLEVEL_OUTOF10: 2
PAINLEVEL_OUTOF10: 9
PAINLEVEL_OUTOF10: 7
PAINLEVEL_OUTOF10: 2
PAINLEVEL_OUTOF10: 3
PAINLEVEL_OUTOF10: 0
PAINLEVEL_OUTOF10: 7
PAINLEVEL_OUTOF10: 10
PAINLEVEL_OUTOF10: 8
PAINLEVEL_OUTOF10: 10
PAINLEVEL_OUTOF10: 10
PAINLEVEL_OUTOF10: 3

## 2018-08-01 ASSESSMENT — PAIN DESCRIPTION - FREQUENCY
FREQUENCY: INTERMITTENT

## 2018-08-01 ASSESSMENT — PAIN DESCRIPTION - DESCRIPTORS
DESCRIPTORS: ACHING;DISCOMFORT;SORE
DESCRIPTORS: ACHING;DISCOMFORT;DULL
DESCRIPTORS: ACHING;DISCOMFORT;SORE

## 2018-08-01 ASSESSMENT — PAIN DESCRIPTION - ONSET
ONSET: ON-GOING
ONSET: ON-GOING

## 2018-08-01 ASSESSMENT — PAIN DESCRIPTION - ORIENTATION
ORIENTATION: RIGHT

## 2018-08-01 ASSESSMENT — PAIN DESCRIPTION - PAIN TYPE
TYPE: SURGICAL PAIN

## 2018-08-01 ASSESSMENT — PAIN DESCRIPTION - LOCATION
LOCATION: KNEE

## 2018-08-01 ASSESSMENT — PAIN DESCRIPTION - PROGRESSION
CLINICAL_PROGRESSION: NOT CHANGED
CLINICAL_PROGRESSION: GRADUALLY WORSENING

## 2018-08-01 NOTE — PROGRESS NOTES
Department of Anesthesiology   Acute Pain Progress Note    Patient's Name: Be Mary  Surgeon: No name on file. Date: 2018    S: Patient reports 8/10 pain at rest and 10/10 with activity. Pain is worst at medial R knee.       Last Pain Score: (Charted in Doc Flowsheet)  Pain Level: 10    O:   Vitals:    18 0800   BP: (!) 120/58   Pulse: 80   Resp: 16   Temp: 97.9 °F (36.6 °C)   SpO2: 94%       Vital Signs Statistics: (for past 24 hrs)     Temp  Av.7 °F (36.5 °C)  Min: 97.1 °F (36.2 °C)   Min taken time: 18 1320  Max: 98.1 °F (36.7 °C)   Max taken time: 18 2316  Pulse  Av  Min: 71   Min taken time: 18 1500  Max: 87   Max taken time: 18 1507  Resp  Av.6  Min: 10   Min taken time: 18 1045  Max: 35   Max taken time: 18 1111  BP  Min: 74/52   Min taken time: 18 1103  Max: 177/82   Max taken time: 18 1028  SpO2  Av %  Min: 91 %   Min taken time: 18 1047  Max: 100 %   Max taken time: 18 1151    BP (!) 120/58   Pulse 80   Temp 97.9 °F (36.6 °C) (Oral)   Resp 16   Ht 5' 2\" (1.575 m)   Wt 172 lb (78 kg)   SpO2 94%   BMI 31.46 kg/m²         CBC:   Lab Results   Component Value Date    WBC 5.6 2018    RBC 3.97 2018    HGB 11.3 2018    HCT 35.2 2018    MCV 98.0 2018    RDW 13.7 2018     2018     CMP:    Lab Results   Component Value Date     2018    K 4.6 2018     2018    CO2 27 2018    BUN 10 2018    CREATININE 0.6 2018    GFRAA >60 2018    LABGLOM >60 2018    GLUCOSE 86 2018    GLUCOSE 99 2011    PROT 6.8 2013    CALCIUM 9.0 2018    BILITOT 0.5 2013    ALKPHOS 154 2013    AST 24 2013    ALT 20 2013     BMP:    Lab Results   Component Value Date     2018    K 4.6 2018     2018    CO2 27 2018    BUN 10 2018    CREATININE 0.6 07/31/2018    CALCIUM 9.0 07/31/2018    GFRAA >60 07/31/2018    LABGLOM >60 07/31/2018    GLUCOSE 86 07/31/2018    GLUCOSE 99 07/02/2011     COAGS:    Lab Results   Component Value Date    PROTIME 11.6 07/31/2018    INR 1.0 07/31/2018    APTT 27.9 07/31/2018           A/P: Smita Carrillo is a 70y.o. year-old s/p R TKA now POD#1 with adequate pain control s/p R adductor canal block  -PO/IV analgesia per surgeon.  -Thank you for opportunity to participate in this patient's care.     Courtney Bettencourt MD

## 2018-08-01 NOTE — PROGRESS NOTES
Subjective:  Blayne Bradley was seen and examined at bedside today. The patient's questions were answered and tests were reviewed. There were no new problems reported overnight. Patient is tolerating current diet.  having significant pain of RLE  No new issues o/w including cp or sob    A complete review of systems and social history was completed on admission and remains unchanged unless otherwise noted    Scheduled Meds:   ropivacaine  30 mL Infiltration Once    [START ON 8/7/2018] vitamin D  50,000 Units Oral Weekly    prenatal vitamin  1 tablet Oral Daily    warfarin  5 mg Oral Daily    atorvastatin  40 mg Oral Nightly    sodium chloride flush  10 mL Intravenous 2 times per day    docusate sodium  100 mg Oral BID    enoxaparin  30 mg Subcutaneous BID     Continuous Infusions:   dextrose 5 % and 0.45 % NaCl 60 mL/hr at 07/31/18 2300     PRN Meds:fentanNYL, midazolam, sodium chloride flush, ondansetron, HYDROcodone 5 mg - acetaminophen **OR** HYDROcodone 5 mg - acetaminophen, morphine **OR** morphine, diphenhydrAMINE    Objective:  /70   Pulse 85   Temp 97.8 °F (36.6 °C) (Oral)   Resp 16   Ht 5' 2\" (1.575 m)   Wt 172 lb (78 kg)   SpO2 94%   BMI 31.46 kg/m²   In: 460 [P.O.:460]  Out: 200    In: 460   Out: 200 [Urine:200]     AAO x 3, currently in NAD  RRR, pos S1, S2  CTA bilaterally, no wheeze, rales or rhonchi  bowel sounds present, nontender, nondistended  No clubbing, cyanosis, or edema  No neuro changes   No obvious rashes or lesions. Recent Labs      08/01/18   0415   HGB  11.3*     Recent Labs      07/31/18   0855   NA  142   K  4.6   CL  105   CO2  27   BUN  10   CREATININE  0.6   GLUCOSE  86     No results for input(s): BILITOT, ALKPHOS, AST, ALT in the last 72 hours. Recent Labs      07/31/18   0855   INR  1.0     No results for input(s): CKTOTAL, CKMB, CKMBINDEX, TROPONINI in the last 72 hours. No results found.   Assessment:  Rogerio Latif is a 70y.o. year old female who presented on 7/31/2018 and is being treated for:  Principal Problem:    Osteoarthritis, localized, knee  Active Problems:    Hypertension    Hx of blood clots    Hyperlipidemia  Resolved Problems:    * No resolved hospital problems. *    Plan  · Cont lovenox until INR therapeutic  · Otherwise continue current therapy including pain control  · Please see orders for further management and care.     Jasiel Lawrence MD  5:58 PM  8/1/2018

## 2018-08-02 LAB
HCT VFR BLD CALC: 36.2 % (ref 34–48)
HEMOGLOBIN: 12.2 G/DL (ref 11.5–15.5)
INR BLD: 1.8
PROTHROMBIN TIME: 19.7 SEC (ref 9.3–12.4)

## 2018-08-02 PROCEDURE — 6360000002 HC RX W HCPCS: Performed by: ORTHOPAEDIC SURGERY

## 2018-08-02 PROCEDURE — 97110 THERAPEUTIC EXERCISES: CPT

## 2018-08-02 PROCEDURE — 6370000000 HC RX 637 (ALT 250 FOR IP): Performed by: ORTHOPAEDIC SURGERY

## 2018-08-02 PROCEDURE — 85018 HEMOGLOBIN: CPT

## 2018-08-02 PROCEDURE — 97530 THERAPEUTIC ACTIVITIES: CPT

## 2018-08-02 PROCEDURE — 2580000003 HC RX 258: Performed by: ORTHOPAEDIC SURGERY

## 2018-08-02 PROCEDURE — 85610 PROTHROMBIN TIME: CPT

## 2018-08-02 PROCEDURE — 85014 HEMATOCRIT: CPT

## 2018-08-02 PROCEDURE — 97535 SELF CARE MNGMENT TRAINING: CPT

## 2018-08-02 PROCEDURE — 1200000000 HC SEMI PRIVATE

## 2018-08-02 PROCEDURE — 36415 COLL VENOUS BLD VENIPUNCTURE: CPT

## 2018-08-02 RX ORDER — WARFARIN SODIUM 5 MG/1
5 TABLET ORAL DAILY
Status: DISCONTINUED | OUTPATIENT
Start: 2018-08-02 | End: 2018-08-03

## 2018-08-02 RX ADMIN — VITAMIN A ACETATE, .BETA.-CAROTENE, ASCORBIC ACID, CHOLECALCIFEROL, .ALPHA.-TOCOPHEROL ACETATE, DL-, THIAMINE MONONITRATE, RIBOFLAVIN, NIACINAMIDE, PYRIDOXINE HYDROCHLORIDE, FOLIC ACID, CYANOCOBALAMIN, CALCIUM CARBONATE, FERROUS FUMARATE, ZINC OXIDE, AND CUPRIC OXIDE 1 TABLET: 2000; 2000; 120; 400; 22; 1.84; 3; 20; 10; 1; 12; 200; 27; 25; 2 TABLET ORAL at 08:59

## 2018-08-02 RX ADMIN — ENOXAPARIN SODIUM 30 MG: 30 INJECTION SUBCUTANEOUS at 20:36

## 2018-08-02 RX ADMIN — ATORVASTATIN CALCIUM 40 MG: 40 TABLET, FILM COATED ORAL at 20:37

## 2018-08-02 RX ADMIN — WARFARIN SODIUM 5 MG: 5 TABLET ORAL at 17:50

## 2018-08-02 RX ADMIN — HYDROCODONE BITARTRATE AND ACETAMINOPHEN 2 TABLET: 5; 325 TABLET ORAL at 08:36

## 2018-08-02 RX ADMIN — HYDROCODONE BITARTRATE AND ACETAMINOPHEN 2 TABLET: 5; 325 TABLET ORAL at 03:09

## 2018-08-02 RX ADMIN — HYDROCODONE BITARTRATE AND ACETAMINOPHEN 2 TABLET: 5; 325 TABLET ORAL at 12:20

## 2018-08-02 RX ADMIN — DOCUSATE SODIUM 100 MG: 100 CAPSULE, LIQUID FILLED ORAL at 09:00

## 2018-08-02 RX ADMIN — DOCUSATE SODIUM 100 MG: 100 CAPSULE, LIQUID FILLED ORAL at 20:37

## 2018-08-02 RX ADMIN — HYDROCODONE BITARTRATE AND ACETAMINOPHEN 2 TABLET: 5; 325 TABLET ORAL at 20:37

## 2018-08-02 RX ADMIN — ENOXAPARIN SODIUM 30 MG: 30 INJECTION SUBCUTANEOUS at 08:58

## 2018-08-02 RX ADMIN — Medication 10 ML: at 08:59

## 2018-08-02 RX ADMIN — Medication 10 ML: at 20:37

## 2018-08-02 RX ADMIN — MORPHINE SULFATE 4 MG: 4 INJECTION INTRAVENOUS at 04:19

## 2018-08-02 ASSESSMENT — PAIN SCALES - GENERAL
PAINLEVEL_OUTOF10: 9
PAINLEVEL_OUTOF10: 7
PAINLEVEL_OUTOF10: 9
PAINLEVEL_OUTOF10: 9
PAINLEVEL_OUTOF10: 7
PAINLEVEL_OUTOF10: 3
PAINLEVEL_OUTOF10: 4
PAINLEVEL_OUTOF10: 10
PAINLEVEL_OUTOF10: 8

## 2018-08-02 ASSESSMENT — PAIN DESCRIPTION - PROGRESSION
CLINICAL_PROGRESSION: NOT CHANGED
CLINICAL_PROGRESSION: NOT CHANGED

## 2018-08-02 ASSESSMENT — PAIN DESCRIPTION - LOCATION: LOCATION: KNEE

## 2018-08-02 ASSESSMENT — PAIN DESCRIPTION - PAIN TYPE: TYPE: SURGICAL PAIN

## 2018-08-02 ASSESSMENT — PAIN DESCRIPTION - DESCRIPTORS: DESCRIPTORS: ACHING

## 2018-08-02 ASSESSMENT — PAIN DESCRIPTION - ORIENTATION: ORIENTATION: RIGHT

## 2018-08-02 NOTE — PROGRESS NOTES
Subjective:  Arianna Bone was seen and examined at bedside today. The patient's questions were answered and tests were reviewed. There were no new problems reported overnight. Patient is tolerating current diet. Pain is better controlled  No further nausea but appetite is still poor    A complete review of systems and social history was completed on admission and remains unchanged unless otherwise noted    Scheduled Meds:   warfarin  5 mg Oral Daily    ropivacaine  30 mL Infiltration Once    [START ON 8/7/2018] vitamin D  50,000 Units Oral Weekly    prenatal vitamin  1 tablet Oral Daily    atorvastatin  40 mg Oral Nightly    sodium chloride flush  10 mL Intravenous 2 times per day    docusate sodium  100 mg Oral BID    enoxaparin  30 mg Subcutaneous BID     Continuous Infusions:   dextrose 5 % and 0.45 % NaCl 60 mL/hr at 07/31/18 2300     PRN Meds:fentanNYL, midazolam, sodium chloride flush, ondansetron, HYDROcodone 5 mg - acetaminophen **OR** HYDROcodone 5 mg - acetaminophen, morphine **OR** morphine, diphenhydrAMINE    Objective:  BP (!) 140/80   Pulse 88   Temp 98.8 °F (37.1 °C) (Oral)   Resp 16   Ht 5' 2\" (1.575 m)   Wt 172 lb (78 kg)   SpO2 90%   BMI 31.46 kg/m²   In: 490 [P.O.:480; I.V.:10]  Out: 200    In: 490   Out: 200 [Urine:200]     AAO x 3, currently in NAD  RRR, pos S1, S2  CTA bilaterally, no wheeze, rales or rhonchi  bowel sounds present, nontender, nondistended  No clubbing, cyanosis, or edema  No neuro changes   No obvious rashes or lesions. Recent Labs      08/01/18   0415  08/02/18   0355   HGB  11.3*  12.2     Recent Labs      07/31/18   0855   NA  142   K  4.6   CL  105   CO2  27   BUN  10   CREATININE  0.6   GLUCOSE  86     No results for input(s): BILITOT, ALKPHOS, AST, ALT in the last 72 hours. Recent Labs      07/31/18   0855  08/02/18   0355   INR  1.0  1.8     No results for input(s): CKTOTAL, CKMB, CKMBINDEX, TROPONINI in the last 72 hours.       No results found.  Assessment:  Madelaine Laboy is a 70y.o. year old female who presented on 7/31/2018 and is being treated for:  Principal Problem:    Osteoarthritis, localized, knee  Active Problems:    Hypertension    Hx of blood clots    Hyperlipidemia  Resolved Problems:    * No resolved hospital problems. *    Plan  · INR 1.8 on current dose of coumadin   · Can d/c lovenox when INR. 2.0  · D/c to rehab tomorrow    Jordan Pham MD  1:52 PM  8/2/2018

## 2018-08-02 NOTE — PROGRESS NOTES
[facility] Quality Flow/Interdisciplinary Rounds Progress Note        Quality Flow Rounds held on August 2, 2018    Disciplines Attending:  Bedside Nurse, ,  and Nursing Unit Leadership    Debby Castorena was admitted on 7/31/2018  8:07 AM    Anticipated Discharge Date:  Expected Discharge Date: 08/03/18    Disposition:    Naseem Score:  Naseem Scale Score: 20    Readmission Risk              Risk of Unplanned Readmission:        8             Discussed patient goal for the day, patient clinical progression, and barriers to discharge.   The following Goal(s) of the Day/Commitment(s) have been identified:  Continue therapy and plan for [facility] on discharge      Aidan Arizmendi  August 2, 2018

## 2018-08-02 NOTE — PROGRESS NOTES
supine- Moderate assist x 1   Transfers-sit to stand- Minimal assist x 1   Gait:  Patient ambulated 20 feet x 2 using Wheeled Walker with Minimal assists x 1. Verbal cues required for sequencing, safety, upright posture, weight bearing,  increased base of support, increased step length,     Steps: NA    Treatment: ankle pumps, gluteal sets, quad sets, heel slides, straight leg raise, hip abduction, hip adduction,  x 15 reps. V/C for technique. Family education/handouts: No family present during tx session. Comment: Call light left by patient. RTB at end of tx session with bed alarm on. A: Pt more alert this pm and able to progress with increased distance but needing consistent cueing for proper sequencing. P: Continue with physical therapy    JESSICA GENTILE, PTA          Goals to be met in 2 days. Bed mobility-Minimal assists    Transfers-Minimal assists    Ambulation-Minimal assists   for 25 feet using  wheeled walker     Increase rom in affected joints by 10%  Increase strength in affected muscle groups by 1/3 grade  Increase balance to allow for improvement towards functional goals. Increase endurance to allow for improvement towards functional goals.

## 2018-08-03 VITALS
HEART RATE: 87 BPM | DIASTOLIC BLOOD PRESSURE: 69 MMHG | BODY MASS INDEX: 31.65 KG/M2 | SYSTOLIC BLOOD PRESSURE: 139 MMHG | OXYGEN SATURATION: 96 % | TEMPERATURE: 98.4 F | RESPIRATION RATE: 16 BRPM | HEIGHT: 62 IN | WEIGHT: 172 LBS

## 2018-08-03 LAB
INR BLD: 1.7
PROTHROMBIN TIME: 18.5 SEC (ref 9.3–12.4)

## 2018-08-03 PROCEDURE — 6360000002 HC RX W HCPCS: Performed by: ORTHOPAEDIC SURGERY

## 2018-08-03 PROCEDURE — 36415 COLL VENOUS BLD VENIPUNCTURE: CPT

## 2018-08-03 PROCEDURE — 85610 PROTHROMBIN TIME: CPT

## 2018-08-03 PROCEDURE — 6370000000 HC RX 637 (ALT 250 FOR IP): Performed by: INTERNAL MEDICINE

## 2018-08-03 PROCEDURE — 6370000000 HC RX 637 (ALT 250 FOR IP): Performed by: ORTHOPAEDIC SURGERY

## 2018-08-03 RX ORDER — WARFARIN SODIUM 7.5 MG/1
7.5 TABLET ORAL
Status: COMPLETED | OUTPATIENT
Start: 2018-08-03 | End: 2018-08-03

## 2018-08-03 RX ORDER — WARFARIN SODIUM 7.5 MG/1
TABLET ORAL
Qty: 30 TABLET | Refills: 3 | Status: ON HOLD
Start: 2018-08-03 | End: 2019-11-10

## 2018-08-03 RX ORDER — WARFARIN SODIUM 7.5 MG/1
7.5 TABLET ORAL DAILY
Status: DISCONTINUED | OUTPATIENT
Start: 2018-08-03 | End: 2018-08-03

## 2018-08-03 RX ADMIN — ENOXAPARIN SODIUM 30 MG: 30 INJECTION SUBCUTANEOUS at 08:01

## 2018-08-03 RX ADMIN — HYDROCODONE BITARTRATE AND ACETAMINOPHEN 2 TABLET: 5; 325 TABLET ORAL at 09:45

## 2018-08-03 RX ADMIN — DOCUSATE SODIUM 100 MG: 100 CAPSULE, LIQUID FILLED ORAL at 08:01

## 2018-08-03 RX ADMIN — HYDROCODONE BITARTRATE AND ACETAMINOPHEN 2 TABLET: 5; 325 TABLET ORAL at 02:35

## 2018-08-03 RX ADMIN — WARFARIN SODIUM 7.5 MG: 7.5 TABLET ORAL at 10:57

## 2018-08-03 RX ADMIN — VITAMIN A ACETATE, .BETA.-CAROTENE, ASCORBIC ACID, CHOLECALCIFEROL, .ALPHA.-TOCOPHEROL ACETATE, DL-, THIAMINE MONONITRATE, RIBOFLAVIN, NIACINAMIDE, PYRIDOXINE HYDROCHLORIDE, FOLIC ACID, CYANOCOBALAMIN, CALCIUM CARBONATE, FERROUS FUMARATE, ZINC OXIDE, AND CUPRIC OXIDE 1 TABLET: 2000; 2000; 120; 400; 22; 1.84; 3; 20; 10; 1; 12; 200; 27; 25; 2 TABLET ORAL at 08:01

## 2018-08-03 ASSESSMENT — PAIN DESCRIPTION - FREQUENCY: FREQUENCY: CONTINUOUS

## 2018-08-03 ASSESSMENT — PAIN SCALES - GENERAL
PAINLEVEL_OUTOF10: 7
PAINLEVEL_OUTOF10: 7
PAINLEVEL_OUTOF10: 5
PAINLEVEL_OUTOF10: 5
PAINLEVEL_OUTOF10: 4

## 2018-08-03 ASSESSMENT — PAIN DESCRIPTION - PAIN TYPE
TYPE: SURGICAL PAIN

## 2018-08-03 ASSESSMENT — PAIN DESCRIPTION - PROGRESSION: CLINICAL_PROGRESSION: GRADUALLY IMPROVING

## 2018-08-03 ASSESSMENT — PAIN DESCRIPTION - ORIENTATION
ORIENTATION: RIGHT

## 2018-08-03 ASSESSMENT — PAIN DESCRIPTION - LOCATION
LOCATION: KNEE

## 2018-08-03 ASSESSMENT — PAIN DESCRIPTION - DESCRIPTORS
DESCRIPTORS: ACHING;SORE
DESCRIPTORS: SORE
DESCRIPTORS: ACHING

## 2018-08-03 ASSESSMENT — PAIN DESCRIPTION - ONSET: ONSET: ON-GOING

## 2018-08-03 NOTE — PROGRESS NOTES
[facility] Quality Flow/Interdisciplinary Rounds Progress Note        Quality Flow Rounds held on August 3, 2018    Disciplines Attending:  Bedside Nurse, ,  and Nursing Unit Leadership    Madelaine Goss was admitted on 7/31/2018  8:07 AM    Anticipated Discharge Date:  Expected Discharge Date: 08/03/18    Disposition:    Naseem Score:  Naseem Scale Score: 19    Readmission Risk              Risk of Unplanned Readmission:        8             Discussed patient goal for the day, patient clinical progression, and barriers to discharge.   The following Goal(s) of the Day/Commitment(s) have been identified:  Awaiting precert for [facility] today      Michelle Fernandez  August 3, 2018

## 2018-08-03 NOTE — PROGRESS NOTES
Subjective:  Royal rivas was seen and examined at bedside today. The patient's questions were answered and tests were reviewed. There were no new problems reported overnight. Patient is tolerating current diet. Pain is controlled    A complete review of systems and social history was completed on admission and remains unchanged unless otherwise noted    Scheduled Meds:   warfarin  5 mg Oral Daily    ropivacaine  30 mL Infiltration Once    [START ON 8/7/2018] vitamin D  50,000 Units Oral Weekly    prenatal vitamin  1 tablet Oral Daily    atorvastatin  40 mg Oral Nightly    sodium chloride flush  10 mL Intravenous 2 times per day    docusate sodium  100 mg Oral BID    enoxaparin  30 mg Subcutaneous BID     Continuous Infusions:  PRN Meds:fentanNYL, midazolam, sodium chloride flush, ondansetron, HYDROcodone 5 mg - acetaminophen **OR** HYDROcodone 5 mg - acetaminophen, morphine **OR** morphine, diphenhydrAMINE    Objective:  /69   Pulse 87   Temp 98.4 °F (36.9 °C) (Oral)   Resp 16   Ht 5' 2\" (1.575 m)   Wt 172 lb (78 kg)   SpO2 96%   BMI 31.46 kg/m²   In: 240 [P.O.:240]  Out: 100    In: 240   Out: 100 [Urine:100]     AAO x 3, currently in NAD  RRR, pos S1, S2  CTA bilaterally, no wheeze, rales or rhonchi  bowel sounds present, nontender, nondistended  No clubbing, cyanosis, or edema  No neuro changes   No obvious rashes or lesions. Recent Labs      08/01/18   0415  08/02/18   0355   HGB  11.3*  12.2     No results for input(s): NA, K, CL, CO2, BUN, CREATININE, GLUCOSE in the last 72 hours. No results for input(s): BILITOT, ALKPHOS, AST, ALT in the last 72 hours. Recent Labs      08/02/18   0355  08/03/18   0341   INR  1.8  1.7     No results for input(s): CKTOTAL, CKMB, CKMBINDEX, TROPONINI in the last 72 hours. No results found.   Assessment:  Kendra Richardson is a 70y.o. year old female who presented on 7/31/2018 and is being treated for:  Principal Problem:    Osteoarthritis, localized, knee  Active Problems:    Hypertension    Hx of blood clots    Hyperlipidemia  Resolved Problems:    * No resolved hospital problems.  *    Plan  · Increase coumadin today  · Ok for d/c to [facility] - cont lovenox until INR > 2.0    Danita White MD  9:58 AM  8/3/2018

## 2018-08-03 NOTE — PROGRESS NOTES
Nurse to nurse called to [facility] DR LAVELL IVORY, spoke with [address] [place] [address].

## 2018-08-05 NOTE — DISCHARGE SUMMARY
Physician Discharge Summary     Patient ID:  Randy Doty  [id number]  73 y.o.  1940    Admit date: 7/31/2018    Discharge date and time: 8/3/2018 11:21 AM     Admitting Physician: Sarah Dong DO     Discharge Physician: Sarah Dong DO    Admission Diagnoses: Osteoarthritis, localized, knee [M17.10]    Discharge Diagnoses: s/p right total Knee arthroplasty    Admission Condition: good    Discharged Condition: good    Hospital Course: The patient was admitted from the pre-operative holding area and underwent an uneventful course of right knee arthroplasty on 7/31/2018 by Sarah Dong DO. The patient was subsequently taken to the PACU and to the floor in stable condition. The patient continued antibiotics 24 hours postoperatively, as they received a dose of antibiotics preoperatively for infection prophylaxis. The patient was to begin physical therapy, [facility], the day of surgery. The patient was also started on DVT prophylaxis. Blood counts were followed daily.  was consulted for D/C planning as the patient made appropriate gains with PT in regaining independent function. On POD 3, the patient was discharged to [facility] in stable condition. Treatments: s/p right total Knee arthroplasty    Disposition: The patient was provided instructions to follow up with ANETA Hale DO in 3 weeks and to call the office for an appointment. Pt was to continue DVT prophylaxis as directed.     Signed:  Alonso Alaniz  8/5/2018  10:01 AM

## 2019-02-08 ENCOUNTER — APPOINTMENT (OUTPATIENT)
Dept: CT IMAGING | Age: 79
End: 2019-02-08
Payer: MEDICARE

## 2019-02-08 ENCOUNTER — APPOINTMENT (OUTPATIENT)
Dept: GENERAL RADIOLOGY | Age: 79
End: 2019-02-08
Payer: MEDICARE

## 2019-02-08 ENCOUNTER — HOSPITAL ENCOUNTER (EMERGENCY)
Age: 79
Discharge: HOME | End: 2019-02-08
Payer: MEDICARE

## 2019-02-08 VITALS
BODY MASS INDEX: 33.99 KG/M2 | RESPIRATION RATE: 18 BRPM | OXYGEN SATURATION: 98 % | DIASTOLIC BLOOD PRESSURE: 85 MMHG | SYSTOLIC BLOOD PRESSURE: 155 MMHG | WEIGHT: 180 LBS | TEMPERATURE: 97.9 F | HEIGHT: 61 IN | HEART RATE: 61 BPM

## 2019-02-08 DIAGNOSIS — R79.1 SUBTHERAPEUTIC INTERNATIONAL NORMALIZED RATIO (INR): Primary | ICD-10-CM

## 2019-02-08 DIAGNOSIS — S09.90XA CLOSED HEAD INJURY, INITIAL ENCOUNTER: ICD-10-CM

## 2019-02-08 DIAGNOSIS — S80.01XA CONTUSION OF RIGHT KNEE, INITIAL ENCOUNTER: ICD-10-CM

## 2019-02-08 LAB
ANION GAP SERPL CALCULATED.3IONS-SCNC: 12 MMOL/L (ref 7–16)
BASOPHILS ABSOLUTE: 0.02 E9/L (ref 0–0.2)
BASOPHILS RELATIVE PERCENT: 0.3 % (ref 0–2)
BUN BLDV-MCNC: 10 MG/DL (ref 8–23)
CALCIUM SERPL-MCNC: 9.1 MG/DL (ref 8.6–10.2)
CHLORIDE BLD-SCNC: 104 MMOL/L (ref 98–107)
CO2: 29 MMOL/L (ref 22–29)
CREAT SERPL-MCNC: 0.7 MG/DL (ref 0.5–1)
EOSINOPHILS ABSOLUTE: 0.05 E9/L (ref 0.05–0.5)
EOSINOPHILS RELATIVE PERCENT: 0.7 % (ref 0–6)
GFR AFRICAN AMERICAN: >60
GFR NON-AFRICAN AMERICAN: >60 ML/MIN/1.73
GLUCOSE BLD-MCNC: 98 MG/DL (ref 74–99)
HCT VFR BLD CALC: 42.4 % (ref 34–48)
HEMOGLOBIN: 13.6 G/DL (ref 11.5–15.5)
IMMATURE GRANULOCYTES #: 0.05 E9/L
IMMATURE GRANULOCYTES %: 0.7 % (ref 0–5)
INR BLD: 1.4
LYMPHOCYTES ABSOLUTE: 2.17 E9/L (ref 1.5–4)
LYMPHOCYTES RELATIVE PERCENT: 30.8 % (ref 20–42)
MCH RBC QN AUTO: 30.4 PG (ref 26–35)
MCHC RBC AUTO-ENTMCNC: 32.1 % (ref 32–34.5)
MCV RBC AUTO: 94.6 FL (ref 80–99.9)
MONOCYTES ABSOLUTE: 0.41 E9/L (ref 0.1–0.95)
MONOCYTES RELATIVE PERCENT: 5.8 % (ref 2–12)
NEUTROPHILS ABSOLUTE: 4.34 E9/L (ref 1.8–7.3)
NEUTROPHILS RELATIVE PERCENT: 61.7 % (ref 43–80)
PDW BLD-RTO: 13.3 FL (ref 11.5–15)
PLATELET # BLD: 159 E9/L (ref 130–450)
PMV BLD AUTO: 10.8 FL (ref 7–12)
POTASSIUM SERPL-SCNC: 3.2 MMOL/L (ref 3.5–5)
PROTHROMBIN TIME: 16.2 SEC (ref 9.3–12.4)
RBC # BLD: 4.48 E12/L (ref 3.5–5.5)
SODIUM BLD-SCNC: 145 MMOL/L (ref 132–146)
WBC # BLD: 7 E9/L (ref 4.5–11.5)

## 2019-02-08 PROCEDURE — 85025 COMPLETE CBC W/AUTO DIFF WBC: CPT

## 2019-02-08 PROCEDURE — 80048 BASIC METABOLIC PNL TOTAL CA: CPT

## 2019-02-08 PROCEDURE — 73562 X-RAY EXAM OF KNEE 3: CPT

## 2019-02-08 PROCEDURE — 70450 CT HEAD/BRAIN W/O DYE: CPT

## 2019-02-08 PROCEDURE — 6370000000 HC RX 637 (ALT 250 FOR IP): Performed by: NURSE PRACTITIONER

## 2019-02-08 PROCEDURE — 85610 PROTHROMBIN TIME: CPT

## 2019-02-08 PROCEDURE — 36415 COLL VENOUS BLD VENIPUNCTURE: CPT

## 2019-02-08 PROCEDURE — 72125 CT NECK SPINE W/O DYE: CPT

## 2019-02-08 PROCEDURE — 99284 EMERGENCY DEPT VISIT MOD MDM: CPT

## 2019-02-08 RX ORDER — ACETAMINOPHEN 325 MG/1
650 TABLET ORAL ONCE
Status: COMPLETED | OUTPATIENT
Start: 2019-02-08 | End: 2019-02-08

## 2019-02-08 RX ADMIN — ACETAMINOPHEN 650 MG: 325 TABLET, FILM COATED ORAL at 16:36

## 2019-02-08 ASSESSMENT — PAIN DESCRIPTION - FREQUENCY: FREQUENCY: INTERMITTENT

## 2019-02-08 ASSESSMENT — PAIN DESCRIPTION - ORIENTATION: ORIENTATION: MID

## 2019-02-08 ASSESSMENT — PAIN DESCRIPTION - DESCRIPTORS: DESCRIPTORS: HEADACHE

## 2019-02-08 ASSESSMENT — PAIN SCALES - GENERAL
PAINLEVEL_OUTOF10: 10
PAINLEVEL_OUTOF10: 10

## 2019-02-08 ASSESSMENT — PAIN DESCRIPTION - PAIN TYPE: TYPE: ACUTE PAIN

## 2019-02-08 ASSESSMENT — PAIN DESCRIPTION - ONSET: ONSET: ON-GOING

## 2019-02-08 ASSESSMENT — PAIN DESCRIPTION - LOCATION: LOCATION: HEAD

## 2019-04-10 ENCOUNTER — HOSPITAL ENCOUNTER (EMERGENCY)
Age: 79
Discharge: HOME | End: 2019-04-10
Payer: MEDICARE

## 2019-04-10 ENCOUNTER — APPOINTMENT (OUTPATIENT)
Dept: CT IMAGING | Age: 79
End: 2019-04-10
Payer: MEDICARE

## 2019-04-10 ENCOUNTER — APPOINTMENT (OUTPATIENT)
Dept: GENERAL RADIOLOGY | Age: 79
End: 2019-04-10
Payer: MEDICARE

## 2019-04-10 VITALS
TEMPERATURE: 98.2 F | BODY MASS INDEX: 30.36 KG/M2 | OXYGEN SATURATION: 95 % | HEIGHT: 62 IN | SYSTOLIC BLOOD PRESSURE: 165 MMHG | RESPIRATION RATE: 16 BRPM | WEIGHT: 165 LBS | HEART RATE: 69 BPM | DIASTOLIC BLOOD PRESSURE: 75 MMHG

## 2019-04-10 DIAGNOSIS — S09.90XA CLOSED HEAD INJURY, INITIAL ENCOUNTER: Primary | ICD-10-CM

## 2019-04-10 DIAGNOSIS — S16.1XXA CERVICAL STRAIN, ACUTE, INITIAL ENCOUNTER: ICD-10-CM

## 2019-04-10 DIAGNOSIS — S80.01XA CONTUSION OF RIGHT KNEE, INITIAL ENCOUNTER: ICD-10-CM

## 2019-04-10 DIAGNOSIS — S63.502A SPRAIN OF LEFT WRIST, INITIAL ENCOUNTER: ICD-10-CM

## 2019-04-10 DIAGNOSIS — S01.81XA FACIAL LACERATION, INITIAL ENCOUNTER: ICD-10-CM

## 2019-04-10 LAB
ANION GAP SERPL CALCULATED.3IONS-SCNC: 9 MMOL/L (ref 7–16)
APTT: 48.8 SEC (ref 24.5–35.1)
BUN BLDV-MCNC: 13 MG/DL (ref 8–23)
CALCIUM SERPL-MCNC: 8.9 MG/DL (ref 8.6–10.2)
CHLORIDE BLD-SCNC: 107 MMOL/L (ref 98–107)
CO2: 26 MMOL/L (ref 22–29)
CREAT SERPL-MCNC: 0.7 MG/DL (ref 0.5–1)
GFR AFRICAN AMERICAN: >60
GFR NON-AFRICAN AMERICAN: >60 ML/MIN/1.73
GLUCOSE BLD-MCNC: 84 MG/DL (ref 74–99)
HCT VFR BLD CALC: 41.4 % (ref 34–48)
HEMOGLOBIN: 13.4 G/DL (ref 11.5–15.5)
INR BLD: 2
MCH RBC QN AUTO: 30.7 PG (ref 26–35)
MCHC RBC AUTO-ENTMCNC: 32.4 % (ref 32–34.5)
MCV RBC AUTO: 95 FL (ref 80–99.9)
PDW BLD-RTO: 13.7 FL (ref 11.5–15)
PLATELET # BLD: 163 E9/L (ref 130–450)
PMV BLD AUTO: 11.7 FL (ref 7–12)
POTASSIUM SERPL-SCNC: 4.2 MMOL/L (ref 3.5–5)
PROTHROMBIN TIME: 22.7 SEC (ref 9.3–12.4)
RBC # BLD: 4.36 E12/L (ref 3.5–5.5)
SODIUM BLD-SCNC: 142 MMOL/L (ref 132–146)
TROPONIN: <0.01 NG/ML (ref 0–0.03)
WBC # BLD: 4.8 E9/L (ref 4.5–11.5)

## 2019-04-10 PROCEDURE — 85027 COMPLETE CBC AUTOMATED: CPT

## 2019-04-10 PROCEDURE — 12013 RPR F/E/E/N/L/M 2.6-5.0 CM: CPT

## 2019-04-10 PROCEDURE — 36415 COLL VENOUS BLD VENIPUNCTURE: CPT

## 2019-04-10 PROCEDURE — 85610 PROTHROMBIN TIME: CPT

## 2019-04-10 PROCEDURE — 99284 EMERGENCY DEPT VISIT MOD MDM: CPT

## 2019-04-10 PROCEDURE — 80048 BASIC METABOLIC PNL TOTAL CA: CPT

## 2019-04-10 PROCEDURE — 84484 ASSAY OF TROPONIN QUANT: CPT

## 2019-04-10 PROCEDURE — 72125 CT NECK SPINE W/O DYE: CPT

## 2019-04-10 PROCEDURE — 70450 CT HEAD/BRAIN W/O DYE: CPT

## 2019-04-10 PROCEDURE — 73110 X-RAY EXAM OF WRIST: CPT

## 2019-04-10 PROCEDURE — 85730 THROMBOPLASTIN TIME PARTIAL: CPT

## 2019-04-10 PROCEDURE — 6370000000 HC RX 637 (ALT 250 FOR IP): Performed by: PHYSICIAN ASSISTANT

## 2019-04-10 PROCEDURE — 12002 RPR S/N/AX/GEN/TRNK2.6-7.5CM: CPT

## 2019-04-10 PROCEDURE — 73562 X-RAY EXAM OF KNEE 3: CPT

## 2019-04-10 RX ORDER — ACETAMINOPHEN 325 MG/1
650 TABLET ORAL ONCE
Status: COMPLETED | OUTPATIENT
Start: 2019-04-10 | End: 2019-04-10

## 2019-04-10 RX ADMIN — ACETAMINOPHEN 650 MG: 325 TABLET ORAL at 21:48

## 2019-04-10 RX ADMIN — ACETAMINOPHEN 650 MG: 325 TABLET ORAL at 18:05

## 2019-04-10 ASSESSMENT — PAIN DESCRIPTION - DESCRIPTORS: DESCRIPTORS: ACHING;DISCOMFORT

## 2019-04-10 ASSESSMENT — PAIN SCALES - GENERAL
PAINLEVEL_OUTOF10: 3
PAINLEVEL_OUTOF10: 7
PAINLEVEL_OUTOF10: 10
PAINLEVEL_OUTOF10: 7

## 2019-04-10 ASSESSMENT — PAIN DESCRIPTION - LOCATION: LOCATION: HEAD

## 2019-04-10 ASSESSMENT — PAIN DESCRIPTION - FREQUENCY: FREQUENCY: CONTINUOUS

## 2019-04-10 ASSESSMENT — PAIN DESCRIPTION - ONSET: ONSET: ON-GOING

## 2019-04-10 NOTE — ED PROVIDER NOTES
Independent MLP  HPI:  4/10/19, Time: 4:01 PM         Shabana Summers is a 70 y.o. female presenting to the ED for  Fall , beginning 1 hour  ago. The complaint has been persistent, moderate in severity, and worsened by nothing. Patient  came in with complaint of fall. She is unsure if she tripped and fell. She states she fell a few months ago and has been having some intermittent dizziness since that time. She denies any chest pain palpitations diaphoresis or nausea today. She does not believe she had episode of dizziness before the fall. She states she did fall and area of her drive that was uneven so she believes she did trip. She hit her head denies any loss of consciousness denies any neck pain she complains of left wrist pain with decreased flexion of the wrist present denies any numbness tingling she also complains of right knee pain had a history of knee replacement in the past. Denies any hip pain patient is able to ambulate. Patient is presently on Coumadin complains of present headache. Review of Systems:   Pertinent positives and negatives are stated within HPI, all other systems reviewed and are negative.          --------------------------------------------- PAST HISTORY ---------------------------------------------  Past Medical History:  has a past medical history of Arthritis, Cerebral artery occlusion with cerebral infarction [facility]), DVT (deep vein thrombosis) in pregnancy [facility]), DVT of lower extremity, bilateral ([facility] [address] Hx of blood clots, Hyperlipidemia, Hypertension, and Nephrolithiasis. Past Surgical History:  has a past surgical history that includes Gastric bypass surgery (March, 2006); Hysterectomy (42 years ago); bladder suspension (2011); Abdomen surgery (2007, 2008); Foot surgery; Cholecystectomy, open (3/21/2013); Endoscopy, colon, diagnostic; Colonoscopy; and pr total knee arthroplasty (N/A, 7/31/2018). Social History:  reports that she has never smoked.  She has never used smokeless tobacco. She reports that she does not drink alcohol or use drugs. Family History: family history includes Cancer in her mother. The patients home medications have been reviewed. Allergies: Demerol hcl [meperidine] and Iv dye [iodides]    -------------------------------------------------- RESULTS -------------------------------------------------  All laboratory and radiology results have been personally reviewed by myself   LABS:  Results for orders placed or performed during the hospital encounter of 68/06/62   Basic metabolic panel   Result Value Ref Range    Sodium 142 132 - 146 mmol/L    Potassium 4.2 3.5 - 5.0 mmol/L    Chloride 107 98 - 107 mmol/L    CO2 26 22 - 29 mmol/L    Anion Gap 9 7 - 16 mmol/L    Glucose 84 74 - 99 mg/dL    BUN 13 8 - 23 mg/dL    CREATININE 0.7 0.5 - 1.0 mg/dL    GFR Non-African American >60 >=60 mL/min/1.73    GFR African American >60     Calcium 8.9 8.6 - 10.2 mg/dL   CBC   Result Value Ref Range    WBC 4.8 4.5 - 11.5 E9/L    RBC 4.36 3.50 - 5.50 E12/L    Hemoglobin 13.4 11.5 - 15.5 g/dL    Hematocrit 41.4 34.0 - 48.0 %    MCV 95.0 80.0 - 99.9 fL    MCH 30.7 26.0 - 35.0 pg    MCHC 32.4 32.0 - 34.5 %    RDW 13.7 11.5 - 15.0 fL    Platelets 077 798 - 839 E9/L    MPV 11.7 7.0 - 12.0 fL   Troponin   Result Value Ref Range    Troponin <0.01 0.00 - 0.03 ng/mL   Protime-INR   Result Value Ref Range    Protime 22.7 (H) 9.3 - 12.4 sec    INR 2.0    APTT   Result Value Ref Range    aPTT 48.8 (H) 24.5 - 35.1 sec   EKG 12 Lead   Result Value Ref Range    Ventricular Rate 67 BPM    Atrial Rate 67 BPM    P-R Interval 180 ms    QRS Duration 80 ms    Q-T Interval 438 ms    QTc Calculation (Bazett) 462 ms    P Axis 58 degrees    R Axis 29 degrees    T Axis 28 degrees       RADIOLOGY:  Interpreted by Radiologist.  CT Head WO Contrast   Final Result      No acute findings or change from prior examination      XR WRIST LEFT (MIN 3 VIEWS)   Final Result   No acute findings.       XR KNEE RIGHT (3 VIEWS)   Final Result   No acute findings. CT Head WO Contrast   Final Result   1. There is no acute intracranial abnormality. Specifically, there is   no acute intracranial hemorrhage. 2. Atrophy and periventricular leukomalacia. 3. Left supraorbital/temporal region soft tissue contusion. Punctate   radiopaque foreign bodies are seen within the laceration. CT Cervical Spine WO Contrast   Final Result   1. There is no acute fracture or subluxation of the cervical spine. 2. Spondylosis. 3. 1.3 cm cystic and partially calcified nodule within the left   thyroid lobe.          ------------------------- NURSING NOTES AND VITALS REVIEWED ---------------------------   The nursing notes within the ED encounter and vital signs as below have been reviewed. BP (!) 165/75   Pulse 69   Temp 98.2 °F (36.8 °C) (Oral)   Resp 16   Ht 5' 2\" (1.575 m)   Wt 165 lb (74.8 kg)   SpO2 95%   BMI 30.18 kg/m²   Oxygen Saturation Interpretation: Normal      ---------------------------------------------------PHYSICAL EXAM--------------------------------------      Constitutional/General: Alert and oriented x3, well appearing, non toxic in NAD  Head: Normocephalic small laceration abrasion of the left forehead with swelling some bleeding present. Eyes: PERRL, EOMI  Mouth: Oropharynx clear, handling secretions, no trismus  Neck: Supple, full ROM, no vertebral point tenderness  Pulmonary: Lungs clear to auscultation bilaterally, no wheezes, rales, or rhonchi. Not in respiratory distress  Cardiovascular:  Regular rate and rhythm, no murmurs, gallops, or rubs. 2+ distal pulses  Abdomen: Soft, non tender, non distended,   Extremities: Moves all extremities x 4. Warm and well perfused tender to palpation of the medial and lateral wrist with slight decreased flexion and extension at the wrist slight swelling pulses normal Refill less than 2 seconds skin generalized tenderness of the right knee.  Joint integrity is intact there is no erythema swelling or abrasion present. Skin: warm and dry without rash  Neurologic: GCS 15,  Psych: Normal Affect      ------------------------------ ED COURSE/MEDICAL DECISION MAKING----------------------  Medications   acetaminophen (TYLENOL) tablet 650 mg (650 mg Oral Given 4/10/19 1805)   acetaminophen (TYLENOL) tablet 650 mg (650 mg Oral Given 4/10/19 2148)         ED COURSE:     EKG # 1  Interpreted by emergency department physician unless otherwise noted. Time: 1619    Rate: 67  Rhythm: Sinus. Interpretation: normal sinus rhythm. unchanged from previous ekg       PROCEDURE NOTE  4/10/19       Time: 1700    LACERATION REPAIR  Risks, benefits and alternatives (for applicable procedures below) described. Performed By: SHAY Castellano. Laceration #: 1  Location: left forehead   Length: .5 cm. The wound area was irrigated with sterile water, cleansed with povidone iodine and draped in a sterile fashion. Local Anesthesia:  Lidocaine 1% without epinephrine. The wound was explored with the following results:  Foreign bodies found and removed. Debridement: None. Undermining: partial thickness and None. Wound Margins Revised: yes. Flaps Aligned: no. The wound was closed with 6-0 Ethilon using interrupted sutures. Dressing:  bacitracin and a sterile dressing was placed. Total number suture:  3. There were no additional lacerations requiring repair. 1815 patient stated on x-rays, labs  will repeat CT at 2100. Patient is agreeable     1950 patient with no complaints at this time     Medical Decision Making:    Patient came in for fall she is presently on Coumadin INR 2. She had normal CT cervical spine, head  on initial exam repeat CT head  obtained, which was negative. Iris Cordon He shouldn't to have sutures removed in 5-7 days follow-up primary care 1-2 days. Counseling:    The emergency provider has spoken with the patient and discussed todays results, in addition to providing specific details for the plan of care and counseling regarding the diagnosis and prognosis. Questions are answered at this time and they are agreeable with the plan.      --------------------------------- IMPRESSION AND DISPOSITION ---------------------------------    IMPRESSION  1. Closed head injury, initial encounter    2. Facial laceration, initial encounter    3. Cervical strain, acute, initial encounter    4. Sprain of left wrist, initial encounter    5. Contusion of right knee, initial encounter        DISPOSITION  Disposition: Discharge to home  Patient condition is good      NOTE: This report was transcribed using voice recognition software.  Every effort was made to ensure accuracy; however, inadvertent computerized transcription errors may be present     Simone Santos [facility]  04/10/19 0051

## 2019-04-19 LAB
EKG ATRIAL RATE: 67 BPM
EKG P AXIS: 58 DEGREES
EKG P-R INTERVAL: 180 MS
EKG Q-T INTERVAL: 438 MS
EKG QRS DURATION: 80 MS
EKG QTC CALCULATION (BAZETT): 462 MS
EKG R AXIS: 29 DEGREES
EKG T AXIS: 28 DEGREES
EKG VENTRICULAR RATE: 67 BPM

## 2019-06-14 ENCOUNTER — HOSPITAL ENCOUNTER (OUTPATIENT)
Dept: ULTRASOUND IMAGING | Age: 79
Discharge: HOME | End: 2019-06-14
Payer: MEDICARE

## 2019-06-14 DIAGNOSIS — R74.8 ELEVATED LIVER ENZYMES: ICD-10-CM

## 2019-06-14 DIAGNOSIS — E04.1 THYROID NODULE: ICD-10-CM

## 2019-06-14 PROCEDURE — 76536 US EXAM OF HEAD AND NECK: CPT

## 2019-06-14 PROCEDURE — 76705 ECHO EXAM OF ABDOMEN: CPT

## 2019-07-08 ENCOUNTER — HOSPITAL ENCOUNTER (OUTPATIENT)
Dept: ULTRASOUND IMAGING | Age: 79
Discharge: HOME | End: 2019-07-08
Payer: MEDICARE

## 2019-07-08 DIAGNOSIS — E04.1 THYROID NODULE: ICD-10-CM

## 2019-07-08 PROCEDURE — 88305 TISSUE EXAM BY PATHOLOGIST: CPT

## 2019-07-08 PROCEDURE — 10005 FNA BX W/US GDN 1ST LES: CPT

## 2019-07-08 PROCEDURE — 88173 CYTOPATH EVAL FNA REPORT: CPT

## 2019-08-13 ENCOUNTER — HOSPITAL ENCOUNTER (OUTPATIENT)
Age: 79
Discharge: HOME | End: 2019-08-13
Payer: MEDICARE

## 2019-08-13 LAB
ALBUMIN SERPL-MCNC: 4.2 G/DL (ref 3.5–5.2)
ALP BLD-CCNC: 126 U/L (ref 35–104)
ALT SERPL-CCNC: 33 U/L (ref 0–32)
ANION GAP SERPL CALCULATED.3IONS-SCNC: 9 MMOL/L (ref 7–16)
AST SERPL-CCNC: 37 U/L (ref 0–31)
BASOPHILS ABSOLUTE: 0.01 E9/L (ref 0–0.2)
BASOPHILS RELATIVE PERCENT: 0.2 % (ref 0–2)
BILIRUB SERPL-MCNC: 0.4 MG/DL (ref 0–1.2)
BILIRUBIN DIRECT: <0.2 MG/DL (ref 0–0.3)
BILIRUBIN, INDIRECT: ABNORMAL MG/DL (ref 0–1)
BUN BLDV-MCNC: 10 MG/DL (ref 8–23)
CALCIUM SERPL-MCNC: 9.2 MG/DL (ref 8.6–10.2)
CHLORIDE BLD-SCNC: 108 MMOL/L (ref 98–107)
CO2: 28 MMOL/L (ref 22–29)
CREAT SERPL-MCNC: 0.6 MG/DL (ref 0.5–1)
EOSINOPHILS ABSOLUTE: 0.06 E9/L (ref 0.05–0.5)
EOSINOPHILS RELATIVE PERCENT: 1.1 % (ref 0–6)
FERRITIN: 103 NG/ML
GFR AFRICAN AMERICAN: >60
GFR NON-AFRICAN AMERICAN: >60 ML/MIN/1.73
GLUCOSE BLD-MCNC: 84 MG/DL (ref 74–99)
HCT VFR BLD CALC: 41.7 % (ref 34–48)
HEMOGLOBIN: 13.4 G/DL (ref 11.5–15.5)
IMMATURE GRANULOCYTES #: 0.01 E9/L
IMMATURE GRANULOCYTES %: 0.2 % (ref 0–5)
INR BLD: 1
IRON SATURATION: 28 % (ref 15–50)
IRON: 82 MCG/DL (ref 37–145)
LYMPHOCYTES ABSOLUTE: 2.71 E9/L (ref 1.5–4)
LYMPHOCYTES RELATIVE PERCENT: 51.4 % (ref 20–42)
MCH RBC QN AUTO: 31.6 PG (ref 26–35)
MCHC RBC AUTO-ENTMCNC: 32.1 % (ref 32–34.5)
MCV RBC AUTO: 98.3 FL (ref 80–99.9)
MONOCYTES ABSOLUTE: 0.43 E9/L (ref 0.1–0.95)
MONOCYTES RELATIVE PERCENT: 8.2 % (ref 2–12)
NEUTROPHILS ABSOLUTE: 2.05 E9/L (ref 1.8–7.3)
NEUTROPHILS RELATIVE PERCENT: 38.9 % (ref 43–80)
PDW BLD-RTO: 13.6 FL (ref 11.5–15)
PLATELET # BLD: 157 E9/L (ref 130–450)
PMV BLD AUTO: 10.2 FL (ref 7–12)
POTASSIUM SERPL-SCNC: 3.9 MMOL/L (ref 3.5–5)
PROTHROMBIN TIME: 10.9 SEC (ref 9.3–12.4)
RBC # BLD: 4.24 E12/L (ref 3.5–5.5)
SODIUM BLD-SCNC: 145 MMOL/L (ref 132–146)
TOTAL IRON BINDING CAPACITY: 294 MCG/DL (ref 250–450)
TOTAL PROTEIN: 6.7 G/DL (ref 6.4–8.3)
WBC # BLD: 5.3 E9/L (ref 4.5–11.5)

## 2019-08-13 PROCEDURE — 82728 ASSAY OF FERRITIN: CPT

## 2019-08-13 PROCEDURE — 36415 COLL VENOUS BLD VENIPUNCTURE: CPT

## 2019-08-13 PROCEDURE — 80076 HEPATIC FUNCTION PANEL: CPT

## 2019-08-13 PROCEDURE — 80048 BASIC METABOLIC PNL TOTAL CA: CPT

## 2019-08-13 PROCEDURE — 83550 IRON BINDING TEST: CPT

## 2019-08-13 PROCEDURE — 80074 ACUTE HEPATITIS PANEL: CPT

## 2019-08-13 PROCEDURE — 85610 PROTHROMBIN TIME: CPT

## 2019-08-13 PROCEDURE — 83540 ASSAY OF IRON: CPT

## 2019-08-13 PROCEDURE — 85025 COMPLETE CBC W/AUTO DIFF WBC: CPT

## 2019-08-14 LAB
HAV IGM SER IA-ACNC: NORMAL
HEPATITIS B CORE IGM ANTIBODY: NORMAL
HEPATITIS B SURFACE ANTIGEN INTERPRETATION: NORMAL
HEPATITIS C ANTIBODY INTERPRETATION: NORMAL

## 2019-08-19 ENCOUNTER — HOSPITAL ENCOUNTER (OUTPATIENT)
Age: 79
Discharge: HOME | End: 2019-08-19
Payer: MEDICARE

## 2019-08-19 LAB
INR BLD: 1
PROTHROMBIN TIME: 11.1 SEC (ref 9.3–12.4)

## 2019-08-19 PROCEDURE — 85610 PROTHROMBIN TIME: CPT

## 2019-08-19 PROCEDURE — 36415 COLL VENOUS BLD VENIPUNCTURE: CPT

## 2019-11-10 ENCOUNTER — APPOINTMENT (OUTPATIENT)
Dept: CT IMAGING | Age: 79
End: 2019-11-10
Payer: MEDICARE

## 2019-11-10 ENCOUNTER — APPOINTMENT (OUTPATIENT)
Dept: GENERAL RADIOLOGY | Age: 79
End: 2019-11-10
Payer: MEDICARE

## 2019-11-10 ENCOUNTER — HOSPITAL ENCOUNTER (OUTPATIENT)
Age: 79
Setting detail: OBSERVATION
Discharge: HOME | End: 2019-11-13
Attending: EMERGENCY MEDICINE | Admitting: INTERNAL MEDICINE
Payer: MEDICARE

## 2019-11-10 DIAGNOSIS — R55 SYNCOPE AND COLLAPSE: Primary | ICD-10-CM

## 2019-11-10 DIAGNOSIS — N39.0 URINARY TRACT INFECTION WITHOUT HEMATURIA, SITE UNSPECIFIED: ICD-10-CM

## 2019-11-10 DIAGNOSIS — R55 SYNCOPE, UNSPECIFIED SYNCOPE TYPE: ICD-10-CM

## 2019-11-10 DIAGNOSIS — E87.6 HYPOKALEMIA: ICD-10-CM

## 2019-11-10 LAB
ALBUMIN SERPL-MCNC: 4 G/DL (ref 3.5–5.2)
ALP BLD-CCNC: 114 U/L (ref 35–104)
ALT SERPL-CCNC: 52 U/L (ref 0–32)
ANION GAP SERPL CALCULATED.3IONS-SCNC: 13 MMOL/L (ref 7–16)
AST SERPL-CCNC: 48 U/L (ref 0–31)
BACTERIA: ABNORMAL /HPF
BASOPHILS ABSOLUTE: 0.01 E9/L (ref 0–0.2)
BASOPHILS RELATIVE PERCENT: 0.1 % (ref 0–2)
BILIRUB SERPL-MCNC: 0.8 MG/DL (ref 0–1.2)
BILIRUBIN URINE: NEGATIVE
BLOOD, URINE: NEGATIVE
BUN BLDV-MCNC: 10 MG/DL (ref 8–23)
CALCIUM SERPL-MCNC: 9 MG/DL (ref 8.6–10.2)
CHLORIDE BLD-SCNC: 103 MMOL/L (ref 98–107)
CLARITY: CLEAR
CO2: 29 MMOL/L (ref 22–29)
COLOR: YELLOW
CREAT SERPL-MCNC: 0.6 MG/DL (ref 0.5–1)
EOSINOPHILS ABSOLUTE: 0.03 E9/L (ref 0.05–0.5)
EOSINOPHILS RELATIVE PERCENT: 0.4 % (ref 0–6)
EPITHELIAL CELLS, UA: ABNORMAL /HPF
GFR AFRICAN AMERICAN: >60
GFR NON-AFRICAN AMERICAN: >60 ML/MIN/1.73
GLUCOSE BLD-MCNC: 85 MG/DL (ref 74–99)
GLUCOSE URINE: NEGATIVE MG/DL
HCT VFR BLD CALC: 42 % (ref 34–48)
HEMOGLOBIN: 13.8 G/DL (ref 11.5–15.5)
IMMATURE GRANULOCYTES #: 0.04 E9/L
IMMATURE GRANULOCYTES %: 0.5 % (ref 0–5)
INR BLD: 1
KETONES, URINE: NEGATIVE MG/DL
LEUKOCYTE ESTERASE, URINE: ABNORMAL
LYMPHOCYTES ABSOLUTE: 1.39 E9/L (ref 1.5–4)
LYMPHOCYTES RELATIVE PERCENT: 17.6 % (ref 20–42)
MAGNESIUM: 1.8 MG/DL (ref 1.6–2.6)
MCH RBC QN AUTO: 31.6 PG (ref 26–35)
MCHC RBC AUTO-ENTMCNC: 32.9 % (ref 32–34.5)
MCV RBC AUTO: 96.1 FL (ref 80–99.9)
MONOCYTES ABSOLUTE: 0.48 E9/L (ref 0.1–0.95)
MONOCYTES RELATIVE PERCENT: 6.1 % (ref 2–12)
NEUTROPHILS ABSOLUTE: 5.96 E9/L (ref 1.8–7.3)
NEUTROPHILS RELATIVE PERCENT: 75.3 % (ref 43–80)
NITRITE, URINE: POSITIVE
PDW BLD-RTO: 13.1 FL (ref 11.5–15)
PH UA: 6 (ref 5–9)
PLATELET # BLD: 168 E9/L (ref 130–450)
PMV BLD AUTO: 10.9 FL (ref 7–12)
POTASSIUM REFLEX MAGNESIUM: 2.9 MMOL/L (ref 3.5–5)
PROTEIN UA: NEGATIVE MG/DL
PROTHROMBIN TIME: 11.7 SEC (ref 9.3–12.4)
RBC # BLD: 4.37 E12/L (ref 3.5–5.5)
RBC UA: ABNORMAL /HPF (ref 0–2)
SODIUM BLD-SCNC: 145 MMOL/L (ref 132–146)
SPECIFIC GRAVITY UA: <=1.005 (ref 1–1.03)
TOTAL PROTEIN: 6.8 G/DL (ref 6.4–8.3)
TROPONIN: <0.01 NG/ML (ref 0–0.03)
TROPONIN: <0.01 NG/ML (ref 0–0.03)
TSH SERPL DL<=0.05 MIU/L-ACNC: 0.86 UIU/ML (ref 0.27–4.2)
UROBILINOGEN, URINE: 0.2 E.U./DL
WBC # BLD: 7.9 E9/L (ref 4.5–11.5)
WBC UA: ABNORMAL /HPF (ref 0–5)

## 2019-11-10 PROCEDURE — 99285 EMERGENCY DEPT VISIT HI MDM: CPT

## 2019-11-10 PROCEDURE — G0378 HOSPITAL OBSERVATION PER HR: HCPCS

## 2019-11-10 PROCEDURE — 96374 THER/PROPH/DIAG INJ IV PUSH: CPT

## 2019-11-10 PROCEDURE — 36415 COLL VENOUS BLD VENIPUNCTURE: CPT

## 2019-11-10 PROCEDURE — 72125 CT NECK SPINE W/O DYE: CPT

## 2019-11-10 PROCEDURE — 81001 URINALYSIS AUTO W/SCOPE: CPT

## 2019-11-10 PROCEDURE — 96372 THER/PROPH/DIAG INJ SC/IM: CPT

## 2019-11-10 PROCEDURE — 71100 X-RAY EXAM RIBS UNI 2 VIEWS: CPT

## 2019-11-10 PROCEDURE — 2580000003 HC RX 258: Performed by: STUDENT IN AN ORGANIZED HEALTH CARE EDUCATION/TRAINING PROGRAM

## 2019-11-10 PROCEDURE — 71046 X-RAY EXAM CHEST 2 VIEWS: CPT

## 2019-11-10 PROCEDURE — 84484 ASSAY OF TROPONIN QUANT: CPT

## 2019-11-10 PROCEDURE — 6370000000 HC RX 637 (ALT 250 FOR IP): Performed by: STUDENT IN AN ORGANIZED HEALTH CARE EDUCATION/TRAINING PROGRAM

## 2019-11-10 PROCEDURE — 6370000000 HC RX 637 (ALT 250 FOR IP): Performed by: INTERNAL MEDICINE

## 2019-11-10 PROCEDURE — 83735 ASSAY OF MAGNESIUM: CPT

## 2019-11-10 PROCEDURE — 70450 CT HEAD/BRAIN W/O DYE: CPT

## 2019-11-10 PROCEDURE — 85025 COMPLETE CBC W/AUTO DIFF WBC: CPT

## 2019-11-10 PROCEDURE — 93005 ELECTROCARDIOGRAM TRACING: CPT | Performed by: STUDENT IN AN ORGANIZED HEALTH CARE EDUCATION/TRAINING PROGRAM

## 2019-11-10 PROCEDURE — 80053 COMPREHEN METABOLIC PANEL: CPT

## 2019-11-10 PROCEDURE — 6360000002 HC RX W HCPCS: Performed by: STUDENT IN AN ORGANIZED HEALTH CARE EDUCATION/TRAINING PROGRAM

## 2019-11-10 PROCEDURE — 70486 CT MAXILLOFACIAL W/O DYE: CPT

## 2019-11-10 PROCEDURE — 1200000000 HC SEMI PRIVATE

## 2019-11-10 PROCEDURE — 2580000003 HC RX 258: Performed by: INTERNAL MEDICINE

## 2019-11-10 PROCEDURE — 84443 ASSAY THYROID STIM HORMONE: CPT

## 2019-11-10 PROCEDURE — 85610 PROTHROMBIN TIME: CPT

## 2019-11-10 RX ORDER — CITALOPRAM 10 MG/1
20 TABLET ORAL DAILY
Status: DISCONTINUED | OUTPATIENT
Start: 2019-11-10 | End: 2019-11-13 | Stop reason: HOSPADM

## 2019-11-10 RX ORDER — TOLTERODINE 4 MG/1
4 CAPSULE, EXTENDED RELEASE ORAL DAILY
COMMUNITY
End: 2020-08-26

## 2019-11-10 RX ORDER — ASPIRIN 81 MG/1
81 TABLET, CHEWABLE ORAL DAILY
Status: DISCONTINUED | OUTPATIENT
Start: 2019-11-10 | End: 2019-11-13 | Stop reason: HOSPADM

## 2019-11-10 RX ORDER — WARFARIN SODIUM 4 MG/1
4 TABLET ORAL DAILY
Status: DISCONTINUED | OUTPATIENT
Start: 2019-11-10 | End: 2019-11-11

## 2019-11-10 RX ORDER — WARFARIN SODIUM 4 MG/1
4 TABLET ORAL
Status: ON HOLD | COMMUNITY
End: 2019-11-13 | Stop reason: HOSPADM

## 2019-11-10 RX ORDER — NICOTINE POLACRILEX 4 MG
15 LOZENGE BUCCAL PRN
Status: DISCONTINUED | OUTPATIENT
Start: 2019-11-10 | End: 2019-11-13 | Stop reason: HOSPADM

## 2019-11-10 RX ORDER — DEXTROSE MONOHYDRATE 25 G/50ML
12.5 INJECTION, SOLUTION INTRAVENOUS PRN
Status: DISCONTINUED | OUTPATIENT
Start: 2019-11-10 | End: 2019-11-13 | Stop reason: HOSPADM

## 2019-11-10 RX ORDER — SODIUM CHLORIDE 0.9 % (FLUSH) 0.9 %
10 SYRINGE (ML) INJECTION PRN
Status: DISCONTINUED | OUTPATIENT
Start: 2019-11-10 | End: 2019-11-13 | Stop reason: HOSPADM

## 2019-11-10 RX ORDER — CITALOPRAM 20 MG/1
20 TABLET ORAL DAILY
COMMUNITY
End: 2020-08-26

## 2019-11-10 RX ORDER — PNV,CALCIUM 72/IRON/FOLIC ACID 27 MG-1 MG
1 TABLET ORAL DAILY
COMMUNITY

## 2019-11-10 RX ORDER — SODIUM CHLORIDE 0.9 % (FLUSH) 0.9 %
10 SYRINGE (ML) INJECTION EVERY 12 HOURS SCHEDULED
Status: DISCONTINUED | OUTPATIENT
Start: 2019-11-10 | End: 2019-11-13 | Stop reason: HOSPADM

## 2019-11-10 RX ORDER — WARFARIN SODIUM 5 MG/1
5 TABLET ORAL
Status: ON HOLD | COMMUNITY
End: 2019-11-13 | Stop reason: HOSPADM

## 2019-11-10 RX ORDER — ACETAMINOPHEN 325 MG/1
650 TABLET ORAL EVERY 4 HOURS PRN
Status: DISCONTINUED | OUTPATIENT
Start: 2019-11-10 | End: 2019-11-13 | Stop reason: HOSPADM

## 2019-11-10 RX ORDER — ATORVASTATIN CALCIUM 40 MG/1
40 TABLET, FILM COATED ORAL NIGHTLY
Status: DISCONTINUED | OUTPATIENT
Start: 2019-11-10 | End: 2019-11-13 | Stop reason: HOSPADM

## 2019-11-10 RX ORDER — DEXTROSE MONOHYDRATE 50 MG/ML
100 INJECTION, SOLUTION INTRAVENOUS PRN
Status: DISCONTINUED | OUTPATIENT
Start: 2019-11-10 | End: 2019-11-13 | Stop reason: HOSPADM

## 2019-11-10 RX ADMIN — ATORVASTATIN CALCIUM 40 MG: 40 TABLET, FILM COATED ORAL at 20:33

## 2019-11-10 RX ADMIN — POTASSIUM BICARBONATE 40 MEQ: 782 TABLET, EFFERVESCENT ORAL at 16:42

## 2019-11-10 RX ADMIN — ASPIRIN 81 MG 81 MG: 81 TABLET ORAL at 20:33

## 2019-11-10 RX ADMIN — CEFTRIAXONE SODIUM 1 G: 1 INJECTION, POWDER, FOR SOLUTION INTRAMUSCULAR; INTRAVENOUS at 16:42

## 2019-11-10 RX ADMIN — ACETAMINOPHEN 650 MG: 325 TABLET, FILM COATED ORAL at 21:01

## 2019-11-10 RX ADMIN — Medication 10 ML: at 20:32

## 2019-11-10 ASSESSMENT — ENCOUNTER SYMPTOMS
COUGH: 0
DIARRHEA: 0
ABDOMINAL DISTENTION: 0
EYE DISCHARGE: 0
WHEEZING: 0
SHORTNESS OF BREATH: 0
BACK PAIN: 0
EYE PAIN: 0
NAUSEA: 0
EYE REDNESS: 0
SORE THROAT: 0
VOMITING: 0

## 2019-11-10 ASSESSMENT — PAIN SCALES - GENERAL
PAINLEVEL_OUTOF10: 3
PAINLEVEL_OUTOF10: 7
PAINLEVEL_OUTOF10: 0
PAINLEVEL_OUTOF10: 4
PAINLEVEL_OUTOF10: 8

## 2019-11-10 ASSESSMENT — PAIN DESCRIPTION - LOCATION: LOCATION: HEAD;NECK

## 2019-11-11 ENCOUNTER — APPOINTMENT (OUTPATIENT)
Dept: ULTRASOUND IMAGING | Age: 79
End: 2019-11-11
Payer: MEDICARE

## 2019-11-11 PROBLEM — N39.0 UTI (URINARY TRACT INFECTION): Status: ACTIVE | Noted: 2019-11-11

## 2019-11-11 LAB
ANION GAP SERPL CALCULATED.3IONS-SCNC: 11 MMOL/L (ref 7–16)
BUN BLDV-MCNC: 9 MG/DL (ref 8–23)
CALCIUM SERPL-MCNC: 8.6 MG/DL (ref 8.6–10.2)
CHLORIDE BLD-SCNC: 104 MMOL/L (ref 98–107)
CHOLESTEROL, TOTAL: 98 MG/DL (ref 0–199)
CO2: 28 MMOL/L (ref 22–29)
CREAT SERPL-MCNC: 0.5 MG/DL (ref 0.5–1)
D DIMER: 567 NG/ML DDU
EKG ATRIAL RATE: 59 BPM
EKG P AXIS: 57 DEGREES
EKG P-R INTERVAL: 186 MS
EKG Q-T INTERVAL: 460 MS
EKG QRS DURATION: 78 MS
EKG QTC CALCULATION (BAZETT): 455 MS
EKG R AXIS: 27 DEGREES
EKG T AXIS: 23 DEGREES
EKG VENTRICULAR RATE: 59 BPM
GFR AFRICAN AMERICAN: >60
GFR NON-AFRICAN AMERICAN: >60 ML/MIN/1.73
GLUCOSE BLD-MCNC: 100 MG/DL (ref 74–99)
HBA1C MFR BLD: 5.2 % (ref 4–5.6)
HCT VFR BLD CALC: 38.3 % (ref 34–48)
HDLC SERPL-MCNC: 55 MG/DL
HEMOGLOBIN: 12.6 G/DL (ref 11.5–15.5)
INR BLD: 1.1
LDL CHOLESTEROL CALCULATED: 30 MG/DL (ref 0–99)
MAGNESIUM: 1.9 MG/DL (ref 1.6–2.6)
MCH RBC QN AUTO: 31.5 PG (ref 26–35)
MCHC RBC AUTO-ENTMCNC: 32.9 % (ref 32–34.5)
MCV RBC AUTO: 95.8 FL (ref 80–99.9)
PDW BLD-RTO: 13.3 FL (ref 11.5–15)
PHOSPHORUS: 3.3 MG/DL (ref 2.5–4.5)
PLATELET # BLD: 148 E9/L (ref 130–450)
PMV BLD AUTO: 11.5 FL (ref 7–12)
POTASSIUM SERPL-SCNC: 3 MMOL/L (ref 3.5–5)
PROTHROMBIN TIME: 12.1 SEC (ref 9.3–12.4)
RBC # BLD: 4 E12/L (ref 3.5–5.5)
SODIUM BLD-SCNC: 143 MMOL/L (ref 132–146)
TRIGL SERPL-MCNC: 64 MG/DL (ref 0–149)
TROPONIN: <0.01 NG/ML (ref 0–0.03)
TROPONIN: <0.01 NG/ML (ref 0–0.03)
TSH SERPL DL<=0.05 MIU/L-ACNC: 0.62 UIU/ML (ref 0.27–4.2)
VLDLC SERPL CALC-MCNC: 13 MG/DL
WBC # BLD: 5.1 E9/L (ref 4.5–11.5)

## 2019-11-11 PROCEDURE — 83735 ASSAY OF MAGNESIUM: CPT

## 2019-11-11 PROCEDURE — 83036 HEMOGLOBIN GLYCOSYLATED A1C: CPT

## 2019-11-11 PROCEDURE — 80061 LIPID PANEL: CPT

## 2019-11-11 PROCEDURE — 6360000002 HC RX W HCPCS: Performed by: NURSE PRACTITIONER

## 2019-11-11 PROCEDURE — 6360000002 HC RX W HCPCS: Performed by: INTERNAL MEDICINE

## 2019-11-11 PROCEDURE — 85027 COMPLETE CBC AUTOMATED: CPT

## 2019-11-11 PROCEDURE — 36415 COLL VENOUS BLD VENIPUNCTURE: CPT

## 2019-11-11 PROCEDURE — 84443 ASSAY THYROID STIM HORMONE: CPT

## 2019-11-11 PROCEDURE — 85378 FIBRIN DEGRADE SEMIQUANT: CPT

## 2019-11-11 PROCEDURE — 80048 BASIC METABOLIC PNL TOTAL CA: CPT

## 2019-11-11 PROCEDURE — 2580000003 HC RX 258: Performed by: INTERNAL MEDICINE

## 2019-11-11 PROCEDURE — 76705 ECHO EXAM OF ABDOMEN: CPT

## 2019-11-11 PROCEDURE — 84100 ASSAY OF PHOSPHORUS: CPT

## 2019-11-11 PROCEDURE — 93880 EXTRACRANIAL BILAT STUDY: CPT

## 2019-11-11 PROCEDURE — 6370000000 HC RX 637 (ALT 250 FOR IP): Performed by: INTERNAL MEDICINE

## 2019-11-11 PROCEDURE — 93010 ELECTROCARDIOGRAM REPORT: CPT | Performed by: INTERNAL MEDICINE

## 2019-11-11 PROCEDURE — 96372 THER/PROPH/DIAG INJ SC/IM: CPT

## 2019-11-11 PROCEDURE — 85610 PROTHROMBIN TIME: CPT

## 2019-11-11 PROCEDURE — 84484 ASSAY OF TROPONIN QUANT: CPT

## 2019-11-11 PROCEDURE — G0378 HOSPITAL OBSERVATION PER HR: HCPCS

## 2019-11-11 PROCEDURE — 96376 TX/PRO/DX INJ SAME DRUG ADON: CPT

## 2019-11-11 RX ORDER — POTASSIUM CHLORIDE 20 MEQ/1
40 TABLET, EXTENDED RELEASE ORAL 2 TIMES DAILY
Status: COMPLETED | OUTPATIENT
Start: 2019-11-11 | End: 2019-11-11

## 2019-11-11 RX ORDER — WARFARIN SODIUM 6 MG/1
6 TABLET ORAL DAILY
Status: DISCONTINUED | OUTPATIENT
Start: 2019-11-12 | End: 2019-11-11

## 2019-11-11 RX ADMIN — ATORVASTATIN CALCIUM 40 MG: 40 TABLET, FILM COATED ORAL at 20:27

## 2019-11-11 RX ADMIN — WARFARIN SODIUM 4 MG: 4 TABLET ORAL at 08:40

## 2019-11-11 RX ADMIN — CEFTRIAXONE SODIUM 1 G: 1 INJECTION, POWDER, FOR SOLUTION INTRAMUSCULAR; INTRAVENOUS at 16:39

## 2019-11-11 RX ADMIN — ACETAMINOPHEN 650 MG: 325 TABLET, FILM COATED ORAL at 13:44

## 2019-11-11 RX ADMIN — Medication 10 ML: at 16:39

## 2019-11-11 RX ADMIN — POTASSIUM CHLORIDE 40 MEQ: 1500 TABLET, EXTENDED RELEASE ORAL at 13:45

## 2019-11-11 RX ADMIN — ACETAMINOPHEN 650 MG: 325 TABLET, FILM COATED ORAL at 20:27

## 2019-11-11 RX ADMIN — ENOXAPARIN SODIUM 70 MG: 100 INJECTION SUBCUTANEOUS at 20:29

## 2019-11-11 RX ADMIN — Medication 10 ML: at 20:30

## 2019-11-11 RX ADMIN — ACETAMINOPHEN 650 MG: 325 TABLET, FILM COATED ORAL at 08:40

## 2019-11-11 RX ADMIN — POTASSIUM CHLORIDE 40 MEQ: 1500 TABLET, EXTENDED RELEASE ORAL at 20:27

## 2019-11-11 RX ADMIN — CITALOPRAM HYDROBROMIDE 20 MG: 10 TABLET ORAL at 08:40

## 2019-11-11 RX ADMIN — ASPIRIN 81 MG 81 MG: 81 TABLET ORAL at 08:40

## 2019-11-11 ASSESSMENT — PAIN DESCRIPTION - PROGRESSION: CLINICAL_PROGRESSION: NOT CHANGED

## 2019-11-11 ASSESSMENT — PAIN DESCRIPTION - DESCRIPTORS
DESCRIPTORS: ACHING;HEADACHE;DISCOMFORT
DESCRIPTORS: HEADACHE;DULL;ACHING

## 2019-11-11 ASSESSMENT — PAIN DESCRIPTION - PAIN TYPE: TYPE: ACUTE PAIN

## 2019-11-11 ASSESSMENT — PAIN DESCRIPTION - ONSET: ONSET: ON-GOING

## 2019-11-11 ASSESSMENT — PAIN SCALES - GENERAL
PAINLEVEL_OUTOF10: 3
PAINLEVEL_OUTOF10: 0
PAINLEVEL_OUTOF10: 7
PAINLEVEL_OUTOF10: 5
PAINLEVEL_OUTOF10: 10

## 2019-11-11 ASSESSMENT — PAIN DESCRIPTION - LOCATION
LOCATION: HEAD
LOCATION: HEAD

## 2019-11-11 ASSESSMENT — PAIN DESCRIPTION - FREQUENCY
FREQUENCY: INTERMITTENT
FREQUENCY: CONTINUOUS

## 2019-11-11 ASSESSMENT — PAIN - FUNCTIONAL ASSESSMENT: PAIN_FUNCTIONAL_ASSESSMENT: PREVENTS OR INTERFERES SOME ACTIVE ACTIVITIES AND ADLS

## 2019-11-12 ENCOUNTER — APPOINTMENT (OUTPATIENT)
Dept: NEUROLOGY | Age: 79
End: 2019-11-12
Payer: MEDICARE

## 2019-11-12 ENCOUNTER — APPOINTMENT (OUTPATIENT)
Dept: ULTRASOUND IMAGING | Age: 79
End: 2019-11-12
Payer: MEDICARE

## 2019-11-12 LAB
ANION GAP SERPL CALCULATED.3IONS-SCNC: 9 MMOL/L (ref 7–16)
BUN BLDV-MCNC: 10 MG/DL (ref 8–23)
CALCIUM SERPL-MCNC: 8.9 MG/DL (ref 8.6–10.2)
CHLORIDE BLD-SCNC: 105 MMOL/L (ref 98–107)
CO2: 30 MMOL/L (ref 22–29)
CREAT SERPL-MCNC: 0.6 MG/DL (ref 0.5–1)
GFR AFRICAN AMERICAN: >60
GFR NON-AFRICAN AMERICAN: >60 ML/MIN/1.73
GLUCOSE BLD-MCNC: 83 MG/DL (ref 74–99)
HCT VFR BLD CALC: 38.6 % (ref 34–48)
HEMOGLOBIN: 12.7 G/DL (ref 11.5–15.5)
INR BLD: 1.1
LV EF: 55 %
LVEF MODALITY: NORMAL
MCH RBC QN AUTO: 32.2 PG (ref 26–35)
MCHC RBC AUTO-ENTMCNC: 32.9 % (ref 32–34.5)
MCV RBC AUTO: 97.7 FL (ref 80–99.9)
PDW BLD-RTO: 13.4 FL (ref 11.5–15)
PLATELET # BLD: 139 E9/L (ref 130–450)
PMV BLD AUTO: 11 FL (ref 7–12)
POTASSIUM SERPL-SCNC: 3.8 MMOL/L (ref 3.5–5)
PROTHROMBIN TIME: 12.4 SEC (ref 9.3–12.4)
RBC # BLD: 3.95 E12/L (ref 3.5–5.5)
SODIUM BLD-SCNC: 144 MMOL/L (ref 132–146)
WBC # BLD: 6.4 E9/L (ref 4.5–11.5)

## 2019-11-12 PROCEDURE — 96372 THER/PROPH/DIAG INJ SC/IM: CPT

## 2019-11-12 PROCEDURE — 93306 TTE W/DOPPLER COMPLETE: CPT

## 2019-11-12 PROCEDURE — 6360000002 HC RX W HCPCS: Performed by: INTERNAL MEDICINE

## 2019-11-12 PROCEDURE — 85027 COMPLETE CBC AUTOMATED: CPT

## 2019-11-12 PROCEDURE — 6360000002 HC RX W HCPCS: Performed by: NURSE PRACTITIONER

## 2019-11-12 PROCEDURE — 36415 COLL VENOUS BLD VENIPUNCTURE: CPT

## 2019-11-12 PROCEDURE — 97530 THERAPEUTIC ACTIVITIES: CPT

## 2019-11-12 PROCEDURE — G0378 HOSPITAL OBSERVATION PER HR: HCPCS

## 2019-11-12 PROCEDURE — 96376 TX/PRO/DX INJ SAME DRUG ADON: CPT

## 2019-11-12 PROCEDURE — 6370000000 HC RX 637 (ALT 250 FOR IP): Performed by: INTERNAL MEDICINE

## 2019-11-12 PROCEDURE — 2580000003 HC RX 258: Performed by: INTERNAL MEDICINE

## 2019-11-12 PROCEDURE — 97161 PT EVAL LOW COMPLEX 20 MIN: CPT | Performed by: PHYSICAL THERAPIST

## 2019-11-12 PROCEDURE — 85610 PROTHROMBIN TIME: CPT

## 2019-11-12 PROCEDURE — 93970 EXTREMITY STUDY: CPT

## 2019-11-12 PROCEDURE — 80048 BASIC METABOLIC PNL TOTAL CA: CPT

## 2019-11-12 PROCEDURE — 97165 OT EVAL LOW COMPLEX 30 MIN: CPT

## 2019-11-12 PROCEDURE — 95816 EEG AWAKE AND DROWSY: CPT

## 2019-11-12 PROCEDURE — 97116 GAIT TRAINING THERAPY: CPT | Performed by: PHYSICAL THERAPIST

## 2019-11-12 RX ADMIN — Medication 10 ML: at 18:24

## 2019-11-12 RX ADMIN — ACETAMINOPHEN 650 MG: 325 TABLET, FILM COATED ORAL at 22:14

## 2019-11-12 RX ADMIN — Medication 10 ML: at 10:33

## 2019-11-12 RX ADMIN — CITALOPRAM HYDROBROMIDE 20 MG: 10 TABLET ORAL at 10:33

## 2019-11-12 RX ADMIN — Medication 10 ML: at 22:15

## 2019-11-12 RX ADMIN — ENOXAPARIN SODIUM 70 MG: 100 INJECTION SUBCUTANEOUS at 22:13

## 2019-11-12 RX ADMIN — ATORVASTATIN CALCIUM 40 MG: 40 TABLET, FILM COATED ORAL at 22:13

## 2019-11-12 RX ADMIN — ENOXAPARIN SODIUM 70 MG: 100 INJECTION SUBCUTANEOUS at 10:32

## 2019-11-12 RX ADMIN — CEFTRIAXONE SODIUM 1 G: 1 INJECTION, POWDER, FOR SOLUTION INTRAMUSCULAR; INTRAVENOUS at 18:23

## 2019-11-12 RX ADMIN — ASPIRIN 81 MG 81 MG: 81 TABLET ORAL at 10:33

## 2019-11-12 ASSESSMENT — PAIN SCALES - GENERAL
PAINLEVEL_OUTOF10: 0

## 2019-11-13 VITALS
OXYGEN SATURATION: 94 % | HEART RATE: 61 BPM | BODY MASS INDEX: 31.61 KG/M2 | HEIGHT: 60 IN | SYSTOLIC BLOOD PRESSURE: 134 MMHG | WEIGHT: 161 LBS | TEMPERATURE: 98.3 F | DIASTOLIC BLOOD PRESSURE: 78 MMHG | RESPIRATION RATE: 18 BRPM

## 2019-11-13 LAB
ANION GAP SERPL CALCULATED.3IONS-SCNC: 11 MMOL/L (ref 7–16)
BUN BLDV-MCNC: 9 MG/DL (ref 8–23)
CALCIUM SERPL-MCNC: 8.5 MG/DL (ref 8.6–10.2)
CHLORIDE BLD-SCNC: 104 MMOL/L (ref 98–107)
CO2: 28 MMOL/L (ref 22–29)
CREAT SERPL-MCNC: 0.5 MG/DL (ref 0.5–1)
GFR AFRICAN AMERICAN: >60
GFR NON-AFRICAN AMERICAN: >60 ML/MIN/1.73
GLUCOSE BLD-MCNC: 92 MG/DL (ref 74–99)
HCT VFR BLD CALC: 37.3 % (ref 34–48)
HEMOGLOBIN: 12.2 G/DL (ref 11.5–15.5)
INR BLD: 1.1
MCH RBC QN AUTO: 31.6 PG (ref 26–35)
MCHC RBC AUTO-ENTMCNC: 32.7 % (ref 32–34.5)
MCV RBC AUTO: 96.6 FL (ref 80–99.9)
PDW BLD-RTO: 13.4 FL (ref 11.5–15)
PLATELET # BLD: 140 E9/L (ref 130–450)
PMV BLD AUTO: 11 FL (ref 7–12)
POTASSIUM SERPL-SCNC: 3.8 MMOL/L (ref 3.5–5)
PROTHROMBIN TIME: 12.2 SEC (ref 9.3–12.4)
RBC # BLD: 3.86 E12/L (ref 3.5–5.5)
SODIUM BLD-SCNC: 143 MMOL/L (ref 132–146)
WBC # BLD: 5.3 E9/L (ref 4.5–11.5)

## 2019-11-13 PROCEDURE — 96372 THER/PROPH/DIAG INJ SC/IM: CPT

## 2019-11-13 PROCEDURE — 80048 BASIC METABOLIC PNL TOTAL CA: CPT

## 2019-11-13 PROCEDURE — G0378 HOSPITAL OBSERVATION PER HR: HCPCS

## 2019-11-13 PROCEDURE — 6370000000 HC RX 637 (ALT 250 FOR IP): Performed by: INTERNAL MEDICINE

## 2019-11-13 PROCEDURE — 36415 COLL VENOUS BLD VENIPUNCTURE: CPT

## 2019-11-13 PROCEDURE — 85027 COMPLETE CBC AUTOMATED: CPT

## 2019-11-13 PROCEDURE — 2580000003 HC RX 258: Performed by: INTERNAL MEDICINE

## 2019-11-13 PROCEDURE — 85610 PROTHROMBIN TIME: CPT

## 2019-11-13 PROCEDURE — 6360000002 HC RX W HCPCS: Performed by: NURSE PRACTITIONER

## 2019-11-13 RX ADMIN — Medication 10 ML: at 08:44

## 2019-11-13 RX ADMIN — ENOXAPARIN SODIUM 70 MG: 100 INJECTION SUBCUTANEOUS at 08:43

## 2019-11-13 RX ADMIN — ASPIRIN 81 MG 81 MG: 81 TABLET ORAL at 08:43

## 2019-11-13 RX ADMIN — CITALOPRAM HYDROBROMIDE 20 MG: 10 TABLET ORAL at 08:43

## 2019-11-13 RX ADMIN — ACETAMINOPHEN 650 MG: 325 TABLET, FILM COATED ORAL at 08:46

## 2019-11-13 ASSESSMENT — PAIN DESCRIPTION - LOCATION: LOCATION: HEAD

## 2019-11-13 ASSESSMENT — PAIN SCALES - GENERAL
PAINLEVEL_OUTOF10: 4
PAINLEVEL_OUTOF10: 0
PAINLEVEL_OUTOF10: 2
PAINLEVEL_OUTOF10: 0

## 2019-11-13 ASSESSMENT — PAIN - FUNCTIONAL ASSESSMENT: PAIN_FUNCTIONAL_ASSESSMENT: ACTIVITIES ARE NOT PREVENTED

## 2019-11-13 ASSESSMENT — PAIN DESCRIPTION - PAIN TYPE: TYPE: ACUTE PAIN

## 2019-11-13 ASSESSMENT — PAIN DESCRIPTION - ORIENTATION: ORIENTATION: POSTERIOR;ANTERIOR

## 2019-11-13 ASSESSMENT — PAIN DESCRIPTION - DESCRIPTORS: DESCRIPTORS: HEADACHE;DULL

## 2019-12-11 PROBLEM — N39.0 UTI (URINARY TRACT INFECTION): Status: RESOLVED | Noted: 2019-11-11 | Resolved: 2019-12-11

## 2020-08-18 ENCOUNTER — APPOINTMENT (OUTPATIENT)
Dept: CT IMAGING | Age: 80
End: 2020-08-18
Payer: COMMERCIAL

## 2020-08-18 ENCOUNTER — APPOINTMENT (OUTPATIENT)
Dept: GENERAL RADIOLOGY | Age: 80
End: 2020-08-18
Payer: COMMERCIAL

## 2020-08-18 ENCOUNTER — HOSPITAL ENCOUNTER (EMERGENCY)
Age: 80
Discharge: HOME | End: 2020-08-18
Attending: EMERGENCY MEDICINE
Payer: COMMERCIAL

## 2020-08-18 VITALS
RESPIRATION RATE: 18 BRPM | DIASTOLIC BLOOD PRESSURE: 69 MMHG | TEMPERATURE: 98.3 F | HEART RATE: 71 BPM | HEIGHT: 61 IN | SYSTOLIC BLOOD PRESSURE: 142 MMHG | OXYGEN SATURATION: 98 % | WEIGHT: 159 LBS | BODY MASS INDEX: 30.02 KG/M2

## 2020-08-18 PROCEDURE — 73562 X-RAY EXAM OF KNEE 3: CPT

## 2020-08-18 PROCEDURE — 73030 X-RAY EXAM OF SHOULDER: CPT

## 2020-08-18 PROCEDURE — 94150 VITAL CAPACITY TEST: CPT

## 2020-08-18 PROCEDURE — 6830039000 HC L3 TRAUMA ALERT

## 2020-08-18 PROCEDURE — 72125 CT NECK SPINE W/O DYE: CPT

## 2020-08-18 PROCEDURE — 70450 CT HEAD/BRAIN W/O DYE: CPT

## 2020-08-18 PROCEDURE — 99285 EMERGENCY DEPT VISIT HI MDM: CPT

## 2020-08-18 ASSESSMENT — ENCOUNTER SYMPTOMS
ABDOMINAL PAIN: 0
BACK PAIN: 0
SHORTNESS OF BREATH: 0

## 2020-08-18 ASSESSMENT — PAIN DESCRIPTION - PAIN TYPE: TYPE: ACUTE PAIN

## 2020-08-18 ASSESSMENT — PAIN DESCRIPTION - LOCATION: LOCATION: NECK;SHOULDER;HEAD

## 2020-08-18 ASSESSMENT — PAIN SCALES - GENERAL: PAINLEVEL_OUTOF10: 4

## 2020-08-18 NOTE — ED NOTES
1743 Hrs - Trauma Alert Called  4802 Hrs - Dr Henrietta Mcbride on scene in [address] [place] [address]  08/18/20 5719
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
## 2020-08-26 ENCOUNTER — OFFICE VISIT (OUTPATIENT)
Dept: NEUROLOGY | Age: 80
End: 2020-08-26
Payer: MEDICARE

## 2020-08-26 VITALS
SYSTOLIC BLOOD PRESSURE: 132 MMHG | WEIGHT: 158 LBS | HEART RATE: 68 BPM | TEMPERATURE: 98.1 F | BODY MASS INDEX: 29.83 KG/M2 | DIASTOLIC BLOOD PRESSURE: 74 MMHG | OXYGEN SATURATION: 97 % | HEIGHT: 61 IN | RESPIRATION RATE: 18 BRPM

## 2020-08-26 PROCEDURE — 99205 OFFICE O/P NEW HI 60 MIN: CPT | Performed by: PSYCHIATRY & NEUROLOGY

## 2020-08-26 RX ORDER — VENLAFAXINE HYDROCHLORIDE 75 MG/1
75 CAPSULE, EXTENDED RELEASE ORAL DAILY
COMMUNITY
Start: 2020-08-13 | End: 2022-11-02 | Stop reason: SDUPTHER

## 2020-08-26 RX ORDER — OXYBUTYNIN CHLORIDE 5 MG/1
5 TABLET ORAL 2 TIMES DAILY
COMMUNITY
Start: 2020-08-13

## 2020-08-26 ASSESSMENT — ENCOUNTER SYMPTOMS
SHORTNESS OF BREATH: 0
TROUBLE SWALLOWING: 0
PHOTOPHOBIA: 0
VOMITING: 0
NAUSEA: 0
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
## 2020-09-10 ENCOUNTER — HOSPITAL ENCOUNTER (OUTPATIENT)
Dept: MRI IMAGING | Age: 80
Discharge: HOME | End: 2020-09-12
Payer: MEDICARE

## 2020-09-10 PROCEDURE — 70551 MRI BRAIN STEM W/O DYE: CPT

## 2020-09-11 NOTE — RESULT ENCOUNTER NOTE
Please inform the patient that the brain MRI shows old strokes or mini strokes in multiple areas of the brain. There are also white spots which are most likely secondary to mini strokes. It is possible that this could be attributing to her memory loss. There is also effusions in her bilateral mastoid areas ( behind the ear). She should follow-up with her primary care physician and see if there is anything that needs to be done about that.

## 2020-09-14 ENCOUNTER — TELEPHONE (OUTPATIENT)
Dept: NEUROLOGY | Age: 80
End: 2020-09-14

## 2020-09-15 ENCOUNTER — TELEPHONE (OUTPATIENT)
Dept: NEUROLOGY | Age: 80
End: 2020-09-15

## 2020-09-15 NOTE — TELEPHONE ENCOUNTER
Spoke with patients daughter Min Guzman and informed her the patients brain MRI shows old strokes or mini strokes in multiple areas of the brain. There are also white spots which are most likely secondary to mini strokes. It is possible that this could be attributing to her memory loss. There is also effusions in her bilateral mastoid areas ( behind the ear).  She should follow-up with her primary care physician and see if there is anything that needs to be done about that. Patients daughter understood.  Patients daughter stating they still plan to get the blood work done that Dr. Marlys Nicholas ordered c

## 2020-09-15 NOTE — TELEPHONE ENCOUNTER
----- Message from Freddie Samayoa MD sent at 9/11/2020 10:56 AM EDT -----  Please inform the patient that the brain MRI shows old strokes or mini strokes in multiple areas of the brain. There are also white spots which are most likely secondary to mini strokes. It is possible that this could be attributing to her memory loss. There is also effusions in her bilateral mastoid areas ( behind the ear). She should follow-up with her primary care physician and see if there is anything that needs to be done about that.

## 2020-10-05 NOTE — PROGRESS NOTES
[facility] Cardiology Progress Note  Dr. Dom Higgins      Referring Physician: Andres Alexandra MD  CHIEF COMPLAINT:   Chief Complaint   Patient presents with    Hypertension     6 month check . Patient denies any cp sob or dizziness. HISTORY OF PRESENT ILLNESS:   Patient is a 68year old female with a medical history of Hypertension, Hyperlipidemia, CVA, history of DVTs, and History of gastric Bypass is here for a follow up visit. Patient denies any chest pain, no shortness of breath, no lightheadedness, no dizziness, no palpitations, no pedal edema, no PND, no orthopnea, no syncope, no presyncopal episodes.     Past Medical History:   Diagnosis Date    Arthritis     Cerebral artery occlusion with cerebral infarction ([address]     DVT (deep vein thrombosis) in pregnancy     DVT of lower extremity, bilateral ([address]     Hx of blood clots     Hyperlipidemia     Hypertension     cured    Nephrolithiasis          Past Surgical History:   Procedure Laterality Date    ABDOMEN SURGERY  2007, 2008    tummy tuck x2    BLADDER SUSPENSION  2011    CHOLECYSTECTOMY, OPEN  3/21/2013    open cholecystectomy with intropertive cholangiogram    COLONOSCOPY      ENDOSCOPY, COLON, DIAGNOSTIC      FOOT SURGERY      GASTRIC BYPASS SURGERY  March, 2006    [facility]    HYSTERECTOMY  42 years ago    HI TOTAL KNEE ARTHROPLASTY N/A 7/31/2018    TOTAL RIGHT KNEE REPLACEMENT ARTHROPLASTY ([facility]YNN) performed by Caterina Kasper DO at [address]         Current Outpatient Medications   Medication Sig Dispense Refill    oxybutynin (DITROPAN) 5 MG tablet Take 5 mg by mouth 2 times daily       venlafaxine (EFFEXOR XR) 75 MG extended release capsule 75 mg daily       rivaroxaban (XARELTO) 20 MG TABS tablet Take 1 tablet by mouth daily (with breakfast) 90 tablet 1    Prenatal Vit-Fe Fumarate-FA (PREPLUS) 27-1 MG TABS Take 1 tablet by mouth daily      atorvastatin (LIPITOR) 40 MG tablet Take 1 tablet by mouth nightly 30 tablet 3    acetaminophen (TYLENOL) 325 MG tablet Take 500 mg by mouth every 6 hours as needed for Pain Takes 3 tabs in am and 3 tab at night       No current facility-administered medications for this visit.           Allergies as of 10/06/2020 - Review Complete 10/06/2020   Allergen Reaction Noted    Demerol hcl [meperidine]  07/28/2003    Iv dye [iodides] Hives 07/01/2012       Social History     Socioeconomic History    Marital status:      Spouse name: Not on file    Number of children: Not on file    Years of education: Not on file    Highest education level: Not on file   Occupational History    Not on file   Social Needs    Financial resource strain: Not on file    Food insecurity     Worry: Not on file     Inability: Not on file   [employer] needs     Medical: Not on file     Non-medical: Not on file   Tobacco Use    Smoking status: Never Smoker    Smokeless tobacco: Never Used   Substance and Sexual Activity    Alcohol use: No     Comment: No coffee [employer]    Drug use: No    Sexual activity: Not on file   Lifestyle    Physical activity     Days per week: Not on file     Minutes per session: Not on file    Stress: Not on file   Relationships    Social connections     Talks on phone: Not on file     Gets together: Not on file     Attends Yarsanism service: Not on file     Active member of club or organization: Not on file     Attends meetings of clubs or organizations: Not on file     Relationship status: Not on file    Intimate partner violence     Fear of current or ex partner: Not on file     Emotionally abused: Not on file     Physically abused: Not on file     Forced sexual activity: Not on file   Other Topics Concern    Not on file   Social History Narrative    Not on file       Family History   Problem Relation Age of Onset    Cancer Mother         leukemia       REVIEW OF SYSTEMS:     CONSTITUTIONAL:  negative for  fevers, chills, sweats and fatigue  HEENT:  negative for  tinnitus, earaches, nasal congestion and epistaxis  RESPIRATORY:  negative for  dry cough, cough with sputum, dyspnea, wheezing and hemoptysis  GASTROINTESTINAL:  negative for nausea, vomiting, diarrhea, constipation, pruritus and jaundice  HEMATOLOGIC/LYMPHATIC:  negative for easy bruising, bleeding, lymphadenopathy and petechiae  ENDOCRINE:  negative for heat intolerance, cold intolerance, tremor, hair loss and diabetic symptoms including neither polyuria nor polydipsia nor blurred vision  MUSCULOSKELETAL:  negative for  myalgias, arthralgias, joint swelling, stiff joints and decreased range of motion  NEUROLOGICAL:  negative for memory problems, speech problems, visual disturbance, dysphagia, weakness and numbness      PHYSICAL EXAM:   CONSTITUTIONAL:  awake, alert, cooperative, no apparent distress, and appears stated age  HEAD:  normocepalic, without obvious abnormality, atraumatic, pink, moist mucous membranes. NECK:  Supple, symmetrical, trachea midline  LUNGS:  No increased work of breathing, good air exchange, clear to auscultation bilaterally, no crackles or wheezing  CARDIOVASCULAR:  Normal apical impulse, regular rate and rhythm, normal S1 and S2, no S3 or S4, and no murmur noted and no JVD, no carotid bruit, no pedal edema, good carotid upstroke bilaterally. ABDOMEN:  Soft, nontender, no masses, no hepatomegaly or splenomegaly, BS+  CHEST: nontender to palpation, expands symmetrically  MUSCULOSKELETAL:  No clubbing no cyanosis. there is no redness, warmth, or swelling of the joints  NEUROLOGIC:  Alert, awake,oriented x3  SKIN:  no bruising or bleeding, normal skin color, texture, turgor and no redness, warmth, or swelling      /68 (Site: Right Upper Arm, Position: Sitting, Cuff Size: Large Adult)   Pulse 75   Ht 5' 1\" (1.549 m)   Wt 155 lb (70.3 kg)   BMI 29.29 kg/m²     DATA:   I personally reviewed the visit EKG with the following interpretation:    EKG 11/10/19 Sinus syncope: Etiology? No reoccurrence. No abnormalities on echocardiogram. Orthostatic negative. Will continue current treatment. 2. Hypertension: Controled   3. Hyperlipidemia: on statin therapy   4. History of DVTs: chronic anticoagulation    5. History of CVA   6. History of gastric Bypass  7. Chronic anticoagulation    RECOMMENDATIONS:   1. Continue current treatment  2. Increase risk of bleeding due to being on anti-coagulation, symptoms and signs of bleeding discussed with patient, patient was advised to seek medical attention at the earliest symptoms or signs of bleeding. 3.  Follow-up with Dr. Mukund Stewart as scheduled  4. Follow-up with Dr. Evans Cuevas in 1 year, sooner if symptomatic for any reason    I have reviewed my findings and recommendations with patient    Electronically signed by Myrtle Reyes MD on 10/10/2020 at 5:34 PM    NOTE: This report was transcribed using voice recognition software.  Every effort was made to ensure accuracy; however, inadvertent computerized transcription errors may be present

## 2020-10-06 ENCOUNTER — OFFICE VISIT (OUTPATIENT)
Dept: CARDIOLOGY CLINIC | Age: 80
End: 2020-10-06
Payer: MEDICARE

## 2020-10-06 VITALS
HEART RATE: 75 BPM | DIASTOLIC BLOOD PRESSURE: 68 MMHG | BODY MASS INDEX: 29.27 KG/M2 | HEIGHT: 61 IN | WEIGHT: 155 LBS | SYSTOLIC BLOOD PRESSURE: 122 MMHG

## 2020-10-06 PROCEDURE — 99213 OFFICE O/P EST LOW 20 MIN: CPT | Performed by: INTERNAL MEDICINE

## 2020-10-06 PROCEDURE — 93000 ELECTROCARDIOGRAM COMPLETE: CPT | Performed by: INTERNAL MEDICINE

## 2020-10-22 ENCOUNTER — TELEPHONE (OUTPATIENT)
Dept: NEUROLOGY | Age: 80
End: 2020-10-22

## 2020-10-22 NOTE — TELEPHONE ENCOUNTER
Overdue letter for blood work mailed to patient no tenderness/nipples normal/no discharge or discoloration/no mass no tenderness

## 2020-11-13 ENCOUNTER — HOSPITAL ENCOUNTER (OUTPATIENT)
Age: 80
Discharge: HOME | End: 2020-11-13
Payer: MEDICARE

## 2020-11-13 PROCEDURE — 84425 ASSAY OF VITAMIN B-1: CPT

## 2020-11-13 PROCEDURE — 82607 VITAMIN B-12: CPT

## 2020-11-13 PROCEDURE — 36415 COLL VENOUS BLD VENIPUNCTURE: CPT

## 2020-11-13 PROCEDURE — 84443 ASSAY THYROID STIM HORMONE: CPT

## 2020-11-14 LAB
TSH SERPL DL<=0.05 MIU/L-ACNC: 1.01 UIU/ML (ref 0.27–4.2)
VITAMIN B-12: 452 PG/ML (ref 211–946)

## 2020-11-19 LAB — VITAMIN B1 WHOLE BLOOD: 198 NMOL/L (ref 70–180)

## 2020-11-20 ENCOUNTER — TELEPHONE (OUTPATIENT)
Dept: NEUROLOGY | Age: 80
End: 2020-11-20

## 2020-11-20 NOTE — TELEPHONE ENCOUNTER
----- Message from Edgard Petty MD sent at 11/20/2020 12:14 PM EST -----  Vitamin B1 levels are slightly high. This is because of the supplementation that she is taking. Nothing to be done.

## 2020-11-20 NOTE — RESULT ENCOUNTER NOTE
Vitamin B1 levels are slightly high. This is because of the supplementation that she is taking. Nothing to be done.

## 2020-11-20 NOTE — TELEPHONE ENCOUNTER
----- Message from Desmond Galindo MD sent at 11/20/2020 12:14 PM EST -----  Vitamin B1 levels are slightly high. This is because of the supplementation that she is taking. Nothing to be done.

## 2020-11-20 NOTE — TELEPHONE ENCOUNTER
Left message for daughter to inform her that her mother blood work is normal. Her Vitamin B1 levels are slightly high.  This is because of the supplementation that she is taking. Nothing to be done.

## 2021-01-19 ENCOUNTER — VIRTUAL VISIT (OUTPATIENT)
Dept: NEUROLOGY | Age: 81
End: 2021-01-19
Payer: MEDICARE

## 2021-01-19 ENCOUNTER — TELEPHONE (OUTPATIENT)
Dept: NEUROLOGY | Age: 81
End: 2021-01-19

## 2021-01-19 DIAGNOSIS — F01.50 VASCULAR DEMENTIA WITHOUT BEHAVIORAL DISTURBANCE (HCC): Primary | ICD-10-CM

## 2021-01-19 PROCEDURE — 99214 OFFICE O/P EST MOD 30 MIN: CPT | Performed by: PSYCHIATRY & NEUROLOGY

## 2021-01-19 RX ORDER — DONEPEZIL HYDROCHLORIDE 5 MG/1
5 TABLET, FILM COATED ORAL NIGHTLY
Qty: 30 TABLET | Refills: 0 | Status: SHIPPED
Start: 2021-01-19 | End: 2021-03-03

## 2021-01-19 ASSESSMENT — ENCOUNTER SYMPTOMS
VOMITING: 0
SHORTNESS OF BREATH: 0
TROUBLE SWALLOWING: 0
PHOTOPHOBIA: 0
NAUSEA: 0

## 2021-01-19 NOTE — TELEPHONE ENCOUNTER
Disclaimer read and accepted by daughter Corrina Hewitt and patient. Patient checked in for virtual visit. Patient wanted billed for copay.

## 2021-01-19 NOTE — PROGRESS NOTES
NEUROLOGY FOLLOW UP PATIENT VIRTUAL VISIT NOTE     Date: 1/19/2021  Name: Abdirizak Garg  MRN: [id number]  Patient's PCP: Loretta Machuca MD        Date of visit: 1/19/21    Start time: 8:11 AM    End time: 8:25 AM    Services were provided through a video synchronous discussion virtually to substitute for in-person clinic visit through [email or web address] me The patient was advised of the risks, benefits and alternatives to telemedicine and agreed to proceed with this type of visit. Pursuant to the emergency declaration under the [address] [place] [address], [facility] waiver authority and the [employer] Act, this Virtual  Visit was conducted, with patient's consent, to reduce the patient's risk of exposure to COVID-19 and provide continuity of care. The patient was also advised the privacy standards of a standard visit continue to apply to telemedicine visits. Follow up for:   Memory loss  The patient reports that memory loss has been getting worse. She still continues to forget things, conversations, misplacing things difficulty with comprehension. I have personally reviewed MRI brain images.     MRI brain 2020: No acute abnormality, moderate to severe small vessel disease, old pontine stroke    Vitamin B12 and TSH levels are normal    PAST MEDICAL HISTORY:   Past Medical History:   Diagnosis Date    Arthritis     Cerebral artery occlusion with cerebral infarction ([address]     DVT (deep vein thrombosis) in pregnancy     DVT of lower extremity, bilateral ([address]     Hx of blood clots     Hyperlipidemia     Hypertension     cured    Nephrolithiasis          PAST SURGICAL HISTORY:   Past Surgical History:   Procedure Laterality Date    ABDOMEN SURGERY  2007, 2008    tummy tuck x2    BLADDER SUSPENSION  2011    CHOLECYSTECTOMY, OPEN  3/21/2013 open cholecystectomy with intropertive cholangiogram    COLONOSCOPY      ENDOSCOPY, COLON, DIAGNOSTIC      FOOT SURGERY     [facility] GASTRIC BYPASS SURGERY  March, 2006    [address], [place]    HYSTERECTOMY  42 years ago    [place] TOTAL KNEE ARTHROPLASTY N/A 7/31/2018    TOTAL RIGHT KNEE REPLACEMENT ARTHROPLASTY ([facility]) performed by Eve Menendez DO at [address]:   Family History   Problem Relation Age of Onset    Cancer Mother         leukemia         SOCIAL HISTORY:   Social History     Socioeconomic History    Marital status:      Spouse name: None    Number of children: None    Years of education: None    Highest education level: None   Occupational History    None   Social Needs    Financial resource strain: None    Food insecurity     Worry: None     Inability: None    Transportation needs     Medical: None     Non-medical: None   Tobacco Use    Smoking status: Never Smoker    Smokeless tobacco: Never Used   Substance and Sexual Activity    Alcohol use: No     Comment: No coffee [employer]    Drug use: No    Sexual activity: None   Lifestyle    Physical activity     Days per week: None     Minutes per session: None    Stress: None   Relationships    Social connections     Talks on phone: None     Gets together: None     Attends Zoroastrianism service: None     Active member of club or organization: None     Attends meetings of clubs or organizations: None     Relationship status: None    Intimate partner violence     Fear of current or ex partner: None     Emotionally abused: None     Physically abused: None     Forced sexual activity: None   Other Topics Concern    None   Social History Narrative    None      E-Cigarettes/Vaping Use     Questions Responses    E-Cigarette/Vaping Use Never User    Start Date     Passive Exposure     Quit Date     Counseling Given     Comments              Allergy:   Allergies   Allergen Reactions    Demerol Hcl [Meperidine]  Iv Dye [Iodides] Hives         MEDS:   Current Outpatient Medications:     donepezil (ARICEPT) 5 MG tablet, Take 1 tablet by mouth nightly, Disp: 30 tablet, Rfl: 0    oxybutynin (DITROPAN) 5 MG tablet, Take 5 mg by mouth 2 times daily , Disp: , Rfl:     venlafaxine (EFFEXOR XR) 75 MG extended release capsule, 75 mg daily , Disp: , Rfl:     rivaroxaban (XARELTO) 20 MG TABS tablet, Take 1 tablet by mouth daily (with breakfast), Disp: 90 tablet, Rfl: 1    Prenatal Vit-Fe Fumarate-FA (PREPLUS) 27-1 MG TABS, Take 1 tablet by mouth daily, Disp: , Rfl:     atorvastatin (LIPITOR) 40 MG tablet, Take 1 tablet by mouth nightly, Disp: 30 tablet, Rfl: 3    acetaminophen (TYLENOL) 325 MG tablet, Take 500 mg by mouth every 6 hours as needed for Pain Takes 3 tabs in am and 3 tab at night, Disp: , Rfl:       REVIEW OF SYSTEMS    Review of Systems   Constitutional: Negative for appetite change, fatigue and unexpected weight change. HENT: Negative for drooling, hearing loss, tinnitus and trouble swallowing. Eyes: Negative for photophobia and visual disturbance. Respiratory: Negative for shortness of breath. Cardiovascular: Negative for palpitations. Gastrointestinal: Negative for nausea and vomiting. Endocrine: Negative for polyuria. Genitourinary: Negative for flank pain. Musculoskeletal: Negative for neck pain and neck stiffness. Skin: Negative for rash. Allergic/Immunologic: Negative for food allergies. Neurological: Negative for dizziness, tremors, seizures, syncope, speech difficulty, weakness, light-headedness, numbness and headaches. Hematological: Negative for adenopathy. Psychiatric/Behavioral: Positive for confusion and decreased concentration. Negative for agitation, behavioral problems and sleep disturbance. PHYSICAL EXAM:     There were no vitals taken for this visit.     Sodium   Date Value Ref Range Status   11/13/2019 143 132 - 146 mmol/L Final   11/12/2019 144 132 - 146 Final   11/11/2019 8.6 8.6 - 10.2 mg/dL Final     Phosphorus   Date Value Ref Range Status   11/11/2019 3.3 2.5 - 4.5 mg/dL Final   04/12/2013 4.1 2.4 - 5.1 mg/dL Final   03/19/2013 4.3 2.4 - 5.1 mg/dL Final     Magnesium   Date Value Ref Range Status   11/11/2019 1.9 1.6 - 2.6 mg/dL Final   11/10/2019 1.8 1.6 - 2.6 mg/dL Final   07/20/2018 1.8 1.6 - 2.6 mg/dL Final     WBC   Date Value Ref Range Status   11/13/2019 5.3 4.5 - 11.5 E9/L Final   11/12/2019 6.4 4.5 - 11.5 E9/L Final   11/11/2019 5.1 4.5 - 11.5 E9/L Final     Hemoglobin   Date Value Ref Range Status   11/13/2019 12.2 11.5 - 15.5 g/dL Final   11/12/2019 12.7 11.5 - 15.5 g/dL Final   11/11/2019 12.6 11.5 - 15.5 g/dL Final     Hematocrit   Date Value Ref Range Status   11/13/2019 37.3 34.0 - 48.0 % Final   11/12/2019 38.6 34.0 - 48.0 % Final   11/11/2019 38.3 34.0 - 48.0 % Final     Platelets   Date Value Ref Range Status   11/13/2019 140 130 - 450 E9/L Final   11/12/2019 139 130 - 450 E9/L Final   11/11/2019 148 130 - 450 E9/L Final     Neutrophils %   Date Value Ref Range Status   11/10/2019 75.3 43.0 - 80.0 % Final   08/13/2019 38.9 (L) 43.0 - 80.0 % Final   02/08/2019 61.7 43.0 - 80.0 % Final     Monocytes %   Date Value Ref Range Status   11/10/2019 6.1 2.0 - 12.0 % Final   08/13/2019 8.2 2.0 - 12.0 % Final   02/08/2019 5.8 2.0 - 12.0 % Final     Total Protein   Date Value Ref Range Status   11/10/2019 6.8 6.4 - 8.3 g/dL Final   08/13/2019 6.7 6.4 - 8.3 g/dL Final   12/27/2013 6.8 6.4 - 8.3 g/dL Final     Total Bilirubin   Date Value Ref Range Status   11/10/2019 0.8 0.0 - 1.2 mg/dL Final   08/13/2019 0.4 0.0 - 1.2 mg/dL Final   12/27/2013 0.5 0.0 - 1.2 mg/dL Final     Alkaline Phosphatase   Date Value Ref Range Status   11/10/2019 114 (H) 35 - 104 U/L Final   08/13/2019 126 (H) 35 - 104 U/L Final   12/27/2013 154 (H) 35 - 104 U/L Final     ALT   Date Value Ref Range Status   11/10/2019 52 (H) 0 - 32 U/L Final   08/13/2019 33 (H) 0 - 32 U/L Final 12/27/2013 20 0 - 32 U/L Final     AST   Date Value Ref Range Status   11/10/2019 48 (H) 0 - 31 U/L Final   08/13/2019 37 (H) 0 - 31 U/L Final   12/27/2013 24 0 - 31 U/L Final     Lab Results   Component Value Date    INR 1.1 11/13/2019     Lab Results   Component Value Date    TRIG 64 11/11/2019    HDL 55 11/11/2019     No components found for: HGBA1C  No results found for: PROTEINCSF, GLUCCSF, WBCCSF    Controlled Substance Monitoring:    Acute and Chronic Pain Monitoring:   No flowsheet data found. MEDICAL DECISION MAKING  ASSESSMENT/PLAN    Delicia Hernandez was seen today for dementia. Diagnoses and all orders for this visit:    Vascular dementia without behavioral disturbance ([facility] [address]    Memory loss      -     Neuropsychological testing; Future  -     donepezil (ARICEPT) 5 MG tablet; Take 1 tablet by mouth nightly       · Memory loss: Etiology vascular dementia versus underlying neurodegenerative process. · She will be referred for neuropsychological testing  · Vitamin B12 TSH levels are normal  · MRI brain: Moderate to severe small vessel disease, old pontine infarct, prominent perivascular spaces  · Start on Aricept  · Risk and benefits of medications were discussed in detail with the patient and her daughter. Care Instructions    Finding out that you have this disease is a shock. You may be afraid and worried about how the condition will change your life. Although there is no cure at this time, medicine in some cases may slow memory loss for a while. Other medicines may be able to help you sleep or cope with depression and behavior changes. Alzheimer's disease is different for everyone. It may take many years to develop. In some cases, people can function well for a long time. In the early stage of the disease, you can do things at home to make life easier and safer. You also can keep doing your hobbies and other activities. Many people find comfort in planning now for their future needs.     Follow-up care is a key part of your treatment and safety. Be sure to make and go to all appointments, and call your doctor if you are having problems. It's also a good idea to know your test results and keep a list of the medicines you take. Taking care of yourself  · If your doctor gives you medicines, take them exactly as prescribed. Call if you think you are having a problem with your medicine. · Eat a balanced diet. Get plenty of whole grains, fruits, and vegetables every day. If you are not hungry at mealtimes, eat snacks at midmorning and in the afternoon. Try drinks such as Boost, Ensure, or Sustacal if you are having trouble keeping your weight up. · Stay active. Exercise such as walking may slow the decline of your mental abilities. Try to stay active mentally too. Read and work crossword puzzles if you enjoy these activities. · If you have trouble sleeping, do not nap during the day. Get regular exercise (but not within several hours of bedtime). Drink a glass of warm milk or caffeine-free herbal tea before going to bed. · Be patient. You may find that a task takes you longer than it used to. · If you have not already done so, make a list of advance directives. Advance directives are instructions to your doctor and family members about what kind of care you want if you become unable to speak or express yourself. Talk to a  about making a will, if you do not already have one. Keeping schedules  · Develop a routine. You will feel less frustrated or confused if you have a clear, simple plan of what to do every day. ? Make lists of your medicines and when to take them. ? Write down appointments and other tasks in a calendar. ? Put sticky notes around the house to help you remember events and other things you have to do. ? Schedule activities and tasks for times of the day when you are best able to handle them. Staying safe  · Tell someone when you are going out and where you are going.  Let the person know when you will be back. Before you go out alone, write down where you are going, how to get there, and how to get back home. Do this even if you have gone there many times before. Take someone along with you when possible. · Make your home safe. Tack down rugs, put no-slip tape in the tub, use handrails, and put safety switches on stoves and appliances. · Use strong lighting, especially at night. Put night-lights in bedrooms, hallways, and bathrooms. · Lower the hot water temperature setting to 120°F or lower to avoid burns. · Avoid Driving if you feel unsafe to drive. Return in about 3 months (around 4/19/2021). Today, I personally spent a great amount of time directly face-to-face time with the patient, of which greater than 50% was spent in patient education, counseling,about etiology management and diagnosis of dementia . Side effects of medications including aricept were discussed in detail with the patient, verbalizes understanding and agrees to it. And coordination of care as described above. Patient's current medication list, allergies, problem list and results of all previously ordered testing and scans were reviewed at today's visit.       Jacob Fulton MD    Neurology & Neurophysiology    [facility]  [address], [place]

## 2021-02-23 ENCOUNTER — IMMUNIZATION (OUTPATIENT)
Dept: PRIMARY CARE CLINIC | Age: 81
End: 2021-02-23
Payer: MEDICARE

## 2021-02-23 PROCEDURE — 0001A COVID-19, PFIZER VACCINE 30MCG/0.3ML DOSE: CPT | Performed by: NURSE PRACTITIONER

## 2021-02-23 PROCEDURE — 91300 COVID-19, PFIZER VACCINE 30MCG/0.3ML DOSE: CPT | Performed by: NURSE PRACTITIONER

## 2021-03-17 ENCOUNTER — IMMUNIZATION (OUTPATIENT)
Dept: PRIMARY CARE CLINIC | Age: 81
End: 2021-03-17
Payer: MEDICARE

## 2021-03-17 PROCEDURE — 0002A COVID-19, PFIZER VACCINE 30MCG/0.3ML DOSE: CPT | Performed by: NURSE PRACTITIONER

## 2021-03-17 PROCEDURE — 91300 COVID-19, PFIZER VACCINE 30MCG/0.3ML DOSE: CPT | Performed by: NURSE PRACTITIONER

## 2021-03-24 ENCOUNTER — APPOINTMENT (OUTPATIENT)
Dept: GENERAL RADIOLOGY | Age: 81
End: 2021-03-24
Payer: MEDICARE

## 2021-03-24 ENCOUNTER — APPOINTMENT (OUTPATIENT)
Dept: ULTRASOUND IMAGING | Age: 81
End: 2021-03-24
Payer: MEDICARE

## 2021-03-24 ENCOUNTER — HOSPITAL ENCOUNTER (EMERGENCY)
Age: 81
Discharge: HOME | End: 2021-03-25
Attending: EMERGENCY MEDICINE
Payer: MEDICARE

## 2021-03-24 DIAGNOSIS — R51.9 ACUTE NONINTRACTABLE HEADACHE, UNSPECIFIED HEADACHE TYPE: Primary | ICD-10-CM

## 2021-03-24 DIAGNOSIS — R60.9 PERIPHERAL EDEMA: ICD-10-CM

## 2021-03-24 DIAGNOSIS — R23.0 PERIPHERAL CYANOSIS: ICD-10-CM

## 2021-03-24 LAB
ANION GAP SERPL CALCULATED.3IONS-SCNC: 8 MMOL/L (ref 7–16)
APTT: 26.6 SEC (ref 24.5–35.1)
BUN BLDV-MCNC: 9 MG/DL (ref 8–23)
CALCIUM SERPL-MCNC: 8.6 MG/DL (ref 8.6–10.2)
CHLORIDE BLD-SCNC: 100 MMOL/L (ref 98–107)
CO2: 30 MMOL/L (ref 22–29)
CREAT SERPL-MCNC: 0.7 MG/DL (ref 0.5–1)
GFR AFRICAN AMERICAN: >60
GFR NON-AFRICAN AMERICAN: >60 ML/MIN/1.73
GLUCOSE BLD-MCNC: 83 MG/DL (ref 74–99)
HCT VFR BLD CALC: 40.3 % (ref 34–48)
HEMOGLOBIN: 12.9 G/DL (ref 11.5–15.5)
INR BLD: 1.1
MCH RBC QN AUTO: 31 PG (ref 26–35)
MCHC RBC AUTO-ENTMCNC: 32 % (ref 32–34.5)
MCV RBC AUTO: 96.9 FL (ref 80–99.9)
PDW BLD-RTO: 13.3 FL (ref 11.5–15)
PLATELET # BLD: 191 E9/L (ref 130–450)
PMV BLD AUTO: 10.9 FL (ref 7–12)
POTASSIUM SERPL-SCNC: 4.9 MMOL/L (ref 3.5–5)
PRO-BNP: 112 PG/ML (ref 0–450)
PROTHROMBIN TIME: 12.9 SEC (ref 9.3–12.4)
RBC # BLD: 4.16 E12/L (ref 3.5–5.5)
SODIUM BLD-SCNC: 138 MMOL/L (ref 132–146)
TROPONIN: <0.01 NG/ML (ref 0–0.03)
WBC # BLD: 6.1 E9/L (ref 4.5–11.5)

## 2021-03-24 PROCEDURE — 85730 THROMBOPLASTIN TIME PARTIAL: CPT

## 2021-03-24 PROCEDURE — 85027 COMPLETE CBC AUTOMATED: CPT

## 2021-03-24 PROCEDURE — 93005 ELECTROCARDIOGRAM TRACING: CPT | Performed by: EMERGENCY MEDICINE

## 2021-03-24 PROCEDURE — 83880 ASSAY OF NATRIURETIC PEPTIDE: CPT

## 2021-03-24 PROCEDURE — 85610 PROTHROMBIN TIME: CPT

## 2021-03-24 PROCEDURE — 93971 EXTREMITY STUDY: CPT

## 2021-03-24 PROCEDURE — 96375 TX/PRO/DX INJ NEW DRUG ADDON: CPT

## 2021-03-24 PROCEDURE — 80048 BASIC METABOLIC PNL TOTAL CA: CPT

## 2021-03-24 PROCEDURE — 96374 THER/PROPH/DIAG INJ IV PUSH: CPT

## 2021-03-24 PROCEDURE — 84484 ASSAY OF TROPONIN QUANT: CPT

## 2021-03-24 PROCEDURE — 6360000002 HC RX W HCPCS: Performed by: EMERGENCY MEDICINE

## 2021-03-24 PROCEDURE — 71045 X-RAY EXAM CHEST 1 VIEW: CPT

## 2021-03-24 PROCEDURE — 99284 EMERGENCY DEPT VISIT MOD MDM: CPT

## 2021-03-24 RX ORDER — DIPHENHYDRAMINE HYDROCHLORIDE 50 MG/ML
25 INJECTION INTRAMUSCULAR; INTRAVENOUS ONCE
Status: COMPLETED | OUTPATIENT
Start: 2021-03-24 | End: 2021-03-24

## 2021-03-24 RX ORDER — METOCLOPRAMIDE HYDROCHLORIDE 5 MG/ML
10 INJECTION INTRAMUSCULAR; INTRAVENOUS ONCE
Status: COMPLETED | OUTPATIENT
Start: 2021-03-24 | End: 2021-03-24

## 2021-03-24 RX ADMIN — METOCLOPRAMIDE 10 MG: 5 INJECTION, SOLUTION INTRAMUSCULAR; INTRAVENOUS at 23:33

## 2021-03-24 RX ADMIN — DIPHENHYDRAMINE HYDROCHLORIDE 25 MG: 50 INJECTION, SOLUTION INTRAMUSCULAR; INTRAVENOUS at 23:12

## 2021-03-24 ASSESSMENT — PAIN DESCRIPTION - LOCATION: LOCATION: HEAD

## 2021-03-24 ASSESSMENT — ENCOUNTER SYMPTOMS
ABDOMINAL DISTENTION: 0
COUGH: 0
EYE REDNESS: 0
EYE DISCHARGE: 0
BACK PAIN: 0
VOMITING: 0
SORE THROAT: 0
WHEEZING: 0
COLOR CHANGE: 1
SHORTNESS OF BREATH: 0
SINUS PRESSURE: 0
EYE PAIN: 0
DIARRHEA: 0
NAUSEA: 0

## 2021-03-24 ASSESSMENT — PAIN DESCRIPTION - PAIN TYPE: TYPE: ACUTE PAIN

## 2021-03-25 ENCOUNTER — APPOINTMENT (OUTPATIENT)
Dept: CT IMAGING | Age: 81
End: 2021-03-25
Payer: MEDICARE

## 2021-03-25 VITALS
HEART RATE: 81 BPM | WEIGHT: 151 LBS | TEMPERATURE: 98.1 F | HEIGHT: 61 IN | RESPIRATION RATE: 18 BRPM | DIASTOLIC BLOOD PRESSURE: 78 MMHG | OXYGEN SATURATION: 97 % | SYSTOLIC BLOOD PRESSURE: 160 MMHG | BODY MASS INDEX: 28.51 KG/M2

## 2021-03-25 LAB
EKG ATRIAL RATE: 63 BPM
EKG P AXIS: 70 DEGREES
EKG P-R INTERVAL: 202 MS
EKG Q-T INTERVAL: 426 MS
EKG QRS DURATION: 72 MS
EKG QTC CALCULATION (BAZETT): 435 MS
EKG R AXIS: 39 DEGREES
EKG T AXIS: 40 DEGREES
EKG VENTRICULAR RATE: 63 BPM

## 2021-03-25 PROCEDURE — 93010 ELECTROCARDIOGRAM REPORT: CPT | Performed by: INTERNAL MEDICINE

## 2021-03-25 PROCEDURE — 70450 CT HEAD/BRAIN W/O DYE: CPT

## 2021-03-25 NOTE — ED PROVIDER NOTES
Patient is a 69 y/o female who presents to the ED with a headache, left leg swelling and discoloration of her fingers of both hands. Patient states that she has had swelling in her left leg for more than 2 months. She states that she was seen by her PCP yesterday and an unknown test was ordered, however, the office has not yet called her. She states today she had a severe headache. The headache resolved spontaneously, however, it returned tonight. The headache is in her temples bilaterally. It is currently 10/10 and is the worst headache of her life. She also states that she was with her friends tonight when they noticed that her fingers of both hands were blue. She states that this has improved since onset. She denies any chest pain or shortness of breath. Review of Systems   Constitutional: Negative for chills and fever. HENT: Negative for ear pain, sinus pressure and sore throat. Eyes: Negative for pain, discharge and redness. Respiratory: Negative for cough, shortness of breath and wheezing. Cardiovascular: Positive for leg swelling. Negative for chest pain. Gastrointestinal: Negative for abdominal distention, diarrhea, nausea and vomiting. Genitourinary: Negative for dysuria and frequency. Musculoskeletal: Negative for arthralgias and back pain. Skin: Positive for color change. Negative for rash and wound. Neurological: Positive for headaches. Negative for weakness. Hematological: Negative for adenopathy. All other systems reviewed and are negative. Physical Exam  Vitals signs and nursing note reviewed. Constitutional:       General: She is not in acute distress. HENT:      Head: Normocephalic and atraumatic.       Right Ear: External ear normal.      Left Ear: External ear normal.      Nose: Nose normal.      Mouth/Throat:      Mouth: Mucous membranes are moist.   Eyes:      Conjunctiva/sclera: Conjunctivae normal.      Pupils: Pupils are equal, round, and reactive to light. Neck:      Musculoskeletal: Normal range of motion and neck supple. Cardiovascular:      Rate and Rhythm: Normal rate and regular rhythm. Heart sounds: No murmur. Pulmonary:      Effort: Pulmonary effort is normal. No respiratory distress. Breath sounds: Normal breath sounds. No stridor. No wheezing, rhonchi or rales. Abdominal:      General: Bowel sounds are normal. There is no distension. Palpations: Abdomen is soft. Tenderness: There is no abdominal tenderness. There is no guarding. Musculoskeletal: Normal range of motion. General: No tenderness. Left lower leg: Edema (3+) present. Skin:     Capillary Refill: Capillary refill takes more than 3 seconds. Comments: Fingers of both hands appear to be cyanotic, cool to touch. Neurological:      Mental Status: She is alert and oriented to person, place, and time. Procedures     MDM           EKG:  Normal sinus rhythm with ventricular rate of 63. MD interval, QRS duration and QT interval within normal range. Normal axis. No ST segment abnormalities to suggest acute ischemia. Time: 3375. Re-evaluation. Patients symptoms are improving  Repeat physical examination is improved  Patient is feeling much better. Headache resolved.         --------------------------------------------- PAST HISTORY ---------------------------------------------  Past Medical History:  has a past medical history of Arthritis, Cerebral artery occlusion with cerebral infarction [facility]), DVT (deep vein thrombosis) in pregnancy, DVT of lower extremity, bilateral ([facility] [address] Hx of blood clots, Hyperlipidemia, Hypertension, and Nephrolithiasis. Past Surgical History:  has a past surgical history that includes Gastric bypass surgery (March, 2006); Hysterectomy (42 years ago); bladder suspension (2011); Abdomen surgery (2007, 2008); Foot surgery; Cholecystectomy, open (3/21/2013);  Endoscopy, colon, diagnostic; Colonoscopy; and pr total knee arthroplasty (N/A, 7/31/2018). Social History:  reports that she has never smoked. She has never used smokeless tobacco. She reports that she does not drink alcohol or use drugs. Family History: family history includes Cancer in her mother. The patients home medications have been reviewed. Allergies: Demerol hcl [meperidine] and Iv dye [iodides]    -------------------------------------------------- RESULTS -------------------------------------------------  Labs:  Results for orders placed or performed during the hospital encounter of 43/21/46   Basic metabolic panel   Result Value Ref Range    Sodium 138 132 - 146 mmol/L    Potassium 4.9 3.5 - 5.0 mmol/L    Chloride 100 98 - 107 mmol/L    CO2 30 (H) 22 - 29 mmol/L    Anion Gap 8 7 - 16 mmol/L    Glucose 83 74 - 99 mg/dL    BUN 9 8 - 23 mg/dL    CREATININE 0.7 0.5 - 1.0 mg/dL    GFR Non-African American >60 >=60 mL/min/1.73    GFR African American >60     Calcium 8.6 8.6 - 10.2 mg/dL   CBC   Result Value Ref Range    WBC 6.1 4.5 - 11.5 E9/L    RBC 4.16 3.50 - 5.50 E12/L    Hemoglobin 12.9 11.5 - 15.5 g/dL    Hematocrit 40.3 34.0 - 48.0 %    MCV 96.9 80.0 - 99.9 fL    MCH 31.0 26.0 - 35.0 pg    MCHC 32.0 32.0 - 34.5 %    RDW 13.3 11.5 - 15.0 fL    Platelets 268 484 - 566 E9/L    MPV 10.9 7.0 - 12.0 fL   Troponin   Result Value Ref Range    Troponin <0.01 0.00 - 0.03 ng/mL   Brain Natriuretic Peptide   Result Value Ref Range    Pro- 0 - 450 pg/mL   Protime-INR   Result Value Ref Range    Protime 12.9 (H) 9.3 - 12.4 sec    INR 1.1    APTT   Result Value Ref Range    aPTT 26.6 24.5 - 35.1 sec   EKG 12 Lead   Result Value Ref Range    Ventricular Rate 63 BPM    Atrial Rate 63 BPM    P-R Interval 202 ms    QRS Duration 72 ms    Q-T Interval 426 ms    QTc Calculation (Bazett) 435 ms    P Axis 70 degrees    R Axis 39 degrees    T Axis 40 degrees       Radiology:  CT HEAD WO CONTRAST   Final Result   No acute intracranial abnormality.   MRI may be obtained if clinically   indicated      RECOMMENDATIONS:   08/18/2020         XR CHEST 1 VIEW   Final Result   No acute process. US DUP LOWER EXTREMITY LEFT YAZMIN   Final Result   No evidence of DVT in the left lower extremity.             ------------------------- NURSING NOTES AND VITALS REVIEWED ---------------------------  Date / Time Roomed:  3/24/2021 10:18 PM  ED Bed Assignment:  12/12    The nursing notes within the ED encounter and vital signs as below have been reviewed. BP (!) 164/78   Pulse 71   Temp 98.1 °F (36.7 °C) (Oral)   Resp 20   Ht 5' 1\" (1.549 m)   Wt 151 lb (68.5 kg)   SpO2 98%   BMI 28.53 kg/m²   Oxygen Saturation Interpretation: Normal      ------------------------------------------ PROGRESS NOTES ------------------------------------------  I have spoken with the patient and discussed todays results, in addition to providing specific details for the plan of care and counseling regarding the diagnosis and prognosis. Their questions are answered at this time and they are agreeable with the plan. I discussed at length with them reasons for immediate return here for re evaluation. They will followup with primary care by calling their office tomorrow. --------------------------------- ADDITIONAL PROVIDER NOTES ---------------------------------  At this time the patient is without objective evidence of an acute process requiring hospitalization or inpatient management. They have remained hemodynamically stable throughout their entire ED visit and are stable for discharge with outpatient follow-up. The plan has been discussed in detail and they are aware of the specific conditions for emergent return, as well as the importance of follow-up. New Prescriptions    No medications on file       Diagnosis:  1. Acute nonintractable headache, unspecified headache type    2. Peripheral edema    3.  Peripheral cyanosis        Disposition:  Patient's disposition: Discharge to home  Patient's condition is stable.            Lyn Colorado DO  03/25/21 2119

## 2021-06-01 DIAGNOSIS — F01.50 VASCULAR DEMENTIA WITHOUT BEHAVIORAL DISTURBANCE (HCC): ICD-10-CM

## 2021-06-03 ENCOUNTER — TELEPHONE (OUTPATIENT)
Dept: NEUROLOGY | Age: 81
End: 2021-06-03

## 2021-06-09 DIAGNOSIS — F01.50 VASCULAR DEMENTIA WITHOUT BEHAVIORAL DISTURBANCE (HCC): ICD-10-CM

## 2021-06-09 RX ORDER — DONEPEZIL HYDROCHLORIDE 5 MG/1
5 TABLET, FILM COATED ORAL NIGHTLY
Qty: 30 TABLET | Refills: 2 | Status: SHIPPED
Start: 2021-06-09 | End: 2021-08-31

## 2021-08-31 DIAGNOSIS — F01.50 VASCULAR DEMENTIA WITHOUT BEHAVIORAL DISTURBANCE (HCC): ICD-10-CM

## 2021-08-31 RX ORDER — DONEPEZIL HYDROCHLORIDE 5 MG/1
5 TABLET, FILM COATED ORAL NIGHTLY
Qty: 28 TABLET | Refills: 2 | OUTPATIENT
Start: 2021-08-31

## 2021-08-31 RX ORDER — DONEPEZIL HYDROCHLORIDE 5 MG/1
5 TABLET, FILM COATED ORAL NIGHTLY
Qty: 28 TABLET | Refills: 2 | Status: SHIPPED | OUTPATIENT
Start: 2021-08-31 | End: 2022-11-02 | Stop reason: SDUPTHER

## 2022-10-04 DIAGNOSIS — E78.5 HYPERLIPIDEMIA, UNSPECIFIED HYPERLIPIDEMIA TYPE: ICD-10-CM

## 2022-10-04 DIAGNOSIS — I10 HYPERTENSION, UNSPECIFIED TYPE: Primary | ICD-10-CM

## 2022-10-04 LAB
ANION GAP SERPL CALCULATED.3IONS-SCNC: 10 MMOL/L (ref 7–16)
BUN BLDV-MCNC: 12 MG/DL (ref 6–23)
CALCIUM SERPL-MCNC: 9.1 MG/DL (ref 8.6–10.2)
CHLORIDE BLD-SCNC: 109 MMOL/L (ref 98–107)
CHOLESTEROL, TOTAL: 131 MG/DL (ref 0–199)
CO2: 30 MMOL/L (ref 22–29)
CREAT SERPL-MCNC: 0.7 MG/DL (ref 0.5–1)
GFR AFRICAN AMERICAN: >60
GFR NON-AFRICAN AMERICAN: >60 ML/MIN/1.73
GLUCOSE BLD-MCNC: 88 MG/DL (ref 74–99)
HCT VFR BLD CALC: 43.6 % (ref 34–48)
HDLC SERPL-MCNC: 61 MG/DL
HEMOGLOBIN: 14 G/DL (ref 11.5–15.5)
LDL CHOLESTEROL CALCULATED: 55 MG/DL (ref 0–99)
MCH RBC QN AUTO: 32 PG (ref 26–35)
MCHC RBC AUTO-ENTMCNC: 32.1 % (ref 32–34.5)
MCV RBC AUTO: 99.5 FL (ref 80–99.9)
PDW BLD-RTO: 14.2 FL (ref 11.5–15)
PLATELET # BLD: 157 E9/L (ref 130–450)
PMV BLD AUTO: 13 FL (ref 7–12)
POTASSIUM SERPL-SCNC: 3.5 MMOL/L (ref 3.5–5)
RBC # BLD: 4.38 E12/L (ref 3.5–5.5)
SODIUM BLD-SCNC: 149 MMOL/L (ref 132–146)
TRIGL SERPL-MCNC: 77 MG/DL (ref 0–149)
TSH SERPL DL<=0.05 MIU/L-ACNC: 1.3 UIU/ML (ref 0.27–4.2)
VLDLC SERPL CALC-MCNC: 15 MG/DL
WBC # BLD: 5.1 E9/L (ref 4.5–11.5)

## 2022-10-24 ENCOUNTER — OFFICE VISIT (OUTPATIENT)
Dept: PRIMARY CARE CLINIC | Age: 82
End: 2022-10-24
Payer: MEDICARE

## 2022-10-24 VITALS
HEIGHT: 61 IN | BODY MASS INDEX: 26.04 KG/M2 | HEART RATE: 68 BPM | SYSTOLIC BLOOD PRESSURE: 118 MMHG | WEIGHT: 137.9 LBS | TEMPERATURE: 97.7 F | OXYGEN SATURATION: 95 % | DIASTOLIC BLOOD PRESSURE: 84 MMHG

## 2022-10-24 DIAGNOSIS — I63.40 CEREBROVASCULAR ACCIDENT (CVA) DUE TO EMBOLISM OF CEREBRAL ARTERY (HCC): ICD-10-CM

## 2022-10-24 DIAGNOSIS — E78.2 MIXED HYPERLIPIDEMIA: ICD-10-CM

## 2022-10-24 DIAGNOSIS — F01.A0 MILD VASCULAR DEMENTIA WITHOUT BEHAVIORAL DISTURBANCE, PSYCHOTIC DISTURBANCE, MOOD DISTURBANCE, OR ANXIETY: ICD-10-CM

## 2022-10-24 DIAGNOSIS — I82.592 CHRONIC DEEP VEIN THROMBOSIS (DVT) OF OTHER VEIN OF LEFT LOWER EXTREMITY (HCC): Primary | ICD-10-CM

## 2022-10-24 PROBLEM — I63.22 ACUTE ISCHEMIC VERTEBROBASILAR ARTERY BRAINSTEM STROKE INVOLVING RIGHT-SIDED VESSEL (HCC): Status: RESOLVED | Noted: 2017-11-28 | Resolved: 2022-10-24

## 2022-10-24 PROBLEM — R55 SYNCOPE AND COLLAPSE: Status: RESOLVED | Noted: 2019-11-10 | Resolved: 2022-10-24

## 2022-10-24 PROBLEM — I63.211 ACUTE ISCHEMIC VERTEBROBASILAR ARTERY BRAINSTEM STROKE INVOLVING RIGHT-SIDED VESSEL (HCC): Status: RESOLVED | Noted: 2017-11-28 | Resolved: 2022-10-24

## 2022-10-24 PROBLEM — Z86.718 HX OF BLOOD CLOTS: Status: RESOLVED | Noted: 2018-07-31 | Resolved: 2022-10-24

## 2022-10-24 PROBLEM — R29.90 STROKE-LIKE SYMPTOM: Status: RESOLVED | Noted: 2017-11-27 | Resolved: 2022-10-24

## 2022-10-24 PROBLEM — M17.10 OSTEOARTHRITIS, LOCALIZED, KNEE: Status: RESOLVED | Noted: 2018-07-31 | Resolved: 2022-10-24

## 2022-10-24 PROCEDURE — 90694 VACC AIIV4 NO PRSRV 0.5ML IM: CPT | Performed by: INTERNAL MEDICINE

## 2022-10-24 PROCEDURE — 99204 OFFICE O/P NEW MOD 45 MIN: CPT | Performed by: INTERNAL MEDICINE

## 2022-10-24 PROCEDURE — G0008 ADMIN INFLUENZA VIRUS VAC: HCPCS | Performed by: INTERNAL MEDICINE

## 2022-10-24 PROCEDURE — 1123F ACP DISCUSS/DSCN MKR DOCD: CPT | Performed by: INTERNAL MEDICINE

## 2022-10-24 SDOH — ECONOMIC STABILITY: FOOD INSECURITY: WITHIN THE PAST 12 MONTHS, YOU WORRIED THAT YOUR FOOD WOULD RUN OUT BEFORE YOU GOT MONEY TO BUY MORE.: NEVER TRUE

## 2022-10-24 SDOH — ECONOMIC STABILITY: FOOD INSECURITY: WITHIN THE PAST 12 MONTHS, THE FOOD YOU BOUGHT JUST DIDN'T LAST AND YOU DIDN'T HAVE MONEY TO GET MORE.: NEVER TRUE

## 2022-10-24 ASSESSMENT — ENCOUNTER SYMPTOMS
BLOOD IN STOOL: 0
SHORTNESS OF BREATH: 0
SORE THROAT: 0
EYE DISCHARGE: 0
DIARRHEA: 0
COUGH: 0
BACK PAIN: 0
VOMITING: 0
ABDOMINAL DISTENTION: 0
EYE ITCHING: 0
WHEEZING: 0
ABDOMINAL PAIN: 0
CONSTIPATION: 0
NAUSEA: 0
TROUBLE SWALLOWING: 0
SINUS PAIN: 0
APNEA: 0
PHOTOPHOBIA: 0

## 2022-10-24 ASSESSMENT — PATIENT HEALTH QUESTIONNAIRE - PHQ9
SUM OF ALL RESPONSES TO PHQ QUESTIONS 1-9: 2
SUM OF ALL RESPONSES TO PHQ QUESTIONS 1-9: 2
2. FEELING DOWN, DEPRESSED OR HOPELESS: 1
SUM OF ALL RESPONSES TO PHQ9 QUESTIONS 1 & 2: 2
SUM OF ALL RESPONSES TO PHQ QUESTIONS 1-9: 2
1. LITTLE INTEREST OR PLEASURE IN DOING THINGS: 1
SUM OF ALL RESPONSES TO PHQ QUESTIONS 1-9: 2

## 2022-10-24 ASSESSMENT — SOCIAL DETERMINANTS OF HEALTH (SDOH): HOW HARD IS IT FOR YOU TO PAY FOR THE VERY BASICS LIKE FOOD, HOUSING, MEDICAL CARE, AND HEATING?: NOT HARD AT ALL

## 2022-10-24 NOTE — PROGRESS NOTES
Mandi Urrutia (:  1940) is a [de-identified] y.o. female,New patient, here for evaluation of the following chief complaint(s):  New Patient (Previously seeing Dr. Vanessa Galan sent. Here to establish.)      Subjective   SUBJECTIVE/OBJECTIVE:  HPI:  1)Hyperlipidemia:  Patient is here to follow up regarding chronic hyperlipidemia. This is  generally controlled. Treatment includes simvastatin. Patient is  compliant with lifestyle modifications. Patient is not a smoker. Most recent labs reviewed with patient today and are not remarkable. Comorbid conditions include obesity. 2) dementia: stable  3)depression: stable on effexor  Review of Systems   Constitutional:  Negative for activity change, appetite change, fever and unexpected weight change. HENT:  Negative for congestion, ear pain, hearing loss, sinus pain, sore throat, tinnitus and trouble swallowing. Eyes:  Negative for photophobia, discharge, itching and visual disturbance. Respiratory:  Negative for apnea, cough, shortness of breath and wheezing. Cardiovascular:  Negative for chest pain, palpitations and leg swelling. Gastrointestinal:  Negative for abdominal distention, abdominal pain, blood in stool, constipation, diarrhea, nausea and vomiting. Endocrine: Negative for cold intolerance, polydipsia and polyuria. Genitourinary:  Negative for difficulty urinating, dysuria, frequency and pelvic pain. Musculoskeletal:  Negative for arthralgias, back pain, joint swelling, myalgias, neck pain and neck stiffness. Skin:  Negative for rash and wound. Neurological:  Negative for dizziness, tremors, syncope, light-headedness and headaches.    CMP:    Lab Results   Component Value Date/Time     10/04/2022 11:03 AM    K 3.5 10/04/2022 11:03 AM    K 2.9 11/10/2019 03:10 PM     10/04/2022 11:03 AM    CO2 30 10/04/2022 11:03 AM    BUN 12 10/04/2022 11:03 AM    CREATININE 0.7 10/04/2022 11:03 AM    GFRAA >60 10/04/2022 11:03 AM LABGLOM >60 10/04/2022 11:03 AM    GLUCOSE 88 10/04/2022 11:03 AM    GLUCOSE 99 07/02/2011 05:55 AM    PROT 6.8 11/10/2019 03:10 PM    LABALBU 4.0 11/10/2019 03:10 PM    LABALBU 4.1 06/24/2011 11:45 AM    CALCIUM 9.1 10/04/2022 11:03 AM    BILITOT 0.8 11/10/2019 03:10 PM    ALKPHOS 114 11/10/2019 03:10 PM    AST 48 11/10/2019 03:10 PM    ALT 52 11/10/2019 03:10 PM     Lipid:   Lab Results   Component Value Date    CHOL 131 10/04/2022    CHOL 98 11/11/2019    CHOL 149 11/27/2017     Lab Results   Component Value Date    TRIG 77 10/04/2022    TRIG 64 11/11/2019    TRIG 96 11/27/2017     Lab Results   Component Value Date    HDL 61 10/04/2022    HDL 55 11/11/2019    HDL 44 11/27/2017     Lab Results   Component Value Date    LDLCALC 55 10/04/2022    LDLCALC 30 11/11/2019    LDLCALC 86 11/27/2017     Lab Results   Component Value Date    LABVLDL 15 10/04/2022    LABVLDL 13 11/11/2019    LABVLDL 19 11/27/2017     No results found for: CHOLHDLRATIO  TSH:    Lab Results   Component Value Date/Time    TSH 1.300 10/04/2022 11:03 AM            Objective   /84   Pulse 68   Temp 97.7 °F (36.5 °C) (Temporal)   Ht 5' 1\" (1.549 m)   Wt 137 lb 14.4 oz (62.6 kg)   SpO2 95%   BMI 26.06 kg/m²   Current Outpatient Medications   Medication Sig Dispense Refill    donepezil (ARICEPT) 5 MG tablet TAKE 1 TABLET BY MOUTH NIGHTLY 28 tablet 2    oxybutynin (DITROPAN) 5 MG tablet Take 5 mg by mouth 2 times daily       venlafaxine (EFFEXOR XR) 75 MG extended release capsule 75 mg daily       rivaroxaban (XARELTO) 20 MG TABS tablet Take 1 tablet by mouth daily (with breakfast) 90 tablet 1    Prenatal Vit-Fe Fumarate-FA (PREPLUS) 27-1 MG TABS Take 1 tablet by mouth daily      atorvastatin (LIPITOR) 40 MG tablet Take 1 tablet by mouth nightly 30 tablet 3    acetaminophen (TYLENOL) 325 MG tablet Take 500 mg by mouth every 6 hours as needed for Pain Takes 3 tabs in am and 3 tab at night       No current facility-administered medications for this visit.       Allergies   Allergen Reactions    Demerol Hcl [Meperidine]     Iv Dye [Iodides] Hives        Past Medical History:   Diagnosis Date    Arthritis     Cerebral artery occlusion with cerebral infarction ([address]     Depression     DVT (deep vein thrombosis) in pregnancy     DVT of lower extremity, bilateral (HCC)     GERD (gastroesophageal reflux disease)     Hx of blood clots     Hyperlipidemia     Hypertension     cured    Nephrolithiasis      Past Surgical History:   Procedure Laterality Date    ABDOMEN SURGERY  2007, 2008    tummy tuck x2    BLADDER SUSPENSION  2011    CHOLECYSTECTOMY, OPEN  3/21/2013    open cholecystectomy with intropertive cholangiogram    COLONOSCOPY      ENDOSCOPY, COLON, DIAGNOSTIC      FOOT SURGERY      GASTRIC BYPASS SURGERY  March, 2006    [facility] [place]    HYSTERECTOMY (CERVIX STATUS UNKNOWN)  42 years ago    AR TOTAL KNEE ARTHROPLASTY N/A 7/31/2018    TOTAL RIGHT KNEE REPLACEMENT ARTHROPLASTY (JOLYNN) performed by Eliecer Lott DO at [address] History   Problem Relation Age of Onset    Cancer Mother         leukemia      Social History     Socioeconomic History    Marital status:      Spouse name: Not on file    Number of children: Not on file    Years of education: Not on file    Highest education level: Not on file   Occupational History    Not on file   Tobacco Use    Smoking status: Never    Smokeless tobacco: Never   Vaping Use    Vaping Use: Never used   Substance and Sexual Activity    Alcohol use: No     Comment: No coffee [employer]    Drug use: No    Sexual activity: Not Currently   Other Topics Concern    Not on file   Social History Narrative    Not on file     Social Determinants of Health     Financial Resource Strain: Low Risk     Difficulty of Paying Living Expenses: Not hard at all   Food Insecurity: No Food Insecurity    Worried About Running Out of Food in the Last Year: Never true    [address] in the Last Year: Never true   Transportation Needs: Not on file   Physical Activity: Not on file   Stress: Not on file   Social Connections: Not on file   Intimate Partner Violence: Not on file   Housing Stability: Not on file      Health Maintenance Due   Topic Date Due    Shingles vaccine (1 of 2) Never done    DEXA (modify frequency per FRAX score)  Never done    Pneumococcal 65+ years Vaccine (2 - PPSV23 if available, else PCV20) 06/03/2016    Annual Wellness Visit (AWV)  Never done    COVID-19 Vaccine (4 - Booster for [employer] series) 04/21/2022        Physical Exam  Constitutional:       Appearance: Normal appearance. She is normal weight. HENT:      Head: Normocephalic and atraumatic. Right Ear: Tympanic membrane and ear canal normal.      Left Ear: Tympanic membrane and ear canal normal.      Nose: Nose normal.      Mouth/Throat:      Mouth: Mucous membranes are moist.      Pharynx: Oropharynx is clear. Eyes:      Extraocular Movements: Extraocular movements intact. Conjunctiva/sclera: Conjunctivae normal.      Pupils: Pupils are equal, round, and reactive to light. Cardiovascular:      Rate and Rhythm: Normal rate and regular rhythm. Pulses: Normal pulses. Heart sounds: Normal heart sounds. Pulmonary:      Effort: Pulmonary effort is normal.      Breath sounds: Normal breath sounds. Abdominal:      General: Abdomen is flat. Bowel sounds are normal.      Palpations: Abdomen is soft. Musculoskeletal:         General: Normal range of motion. Right shoulder: Normal.      Left shoulder: Normal.      Right upper arm: Normal.      Left upper arm: Normal.      Right elbow: Normal.      Left elbow: Normal.      Right forearm: Normal.      Left forearm: Normal.      Right wrist: Normal.      Left wrist: Normal.      Right hand: Normal.      Left hand: Normal.      Cervical back: Normal, normal range of motion and neck supple. Lumbar back: Normal.   Skin:     General: Skin is warm and dry.

## 2022-11-02 ENCOUNTER — OFFICE VISIT (OUTPATIENT)
Dept: PRIMARY CARE CLINIC | Age: 82
End: 2022-11-02
Payer: MEDICARE

## 2022-11-02 VITALS
HEART RATE: 66 BPM | DIASTOLIC BLOOD PRESSURE: 68 MMHG | BODY MASS INDEX: 25.77 KG/M2 | SYSTOLIC BLOOD PRESSURE: 116 MMHG | HEIGHT: 61 IN | WEIGHT: 136.5 LBS | OXYGEN SATURATION: 94 % | TEMPERATURE: 97.6 F

## 2022-11-02 DIAGNOSIS — R29.6 RECURRENT FALLS: ICD-10-CM

## 2022-11-02 DIAGNOSIS — Z00.00 INITIAL MEDICARE ANNUAL WELLNESS VISIT: Primary | ICD-10-CM

## 2022-11-02 DIAGNOSIS — M25.559 HIP PAIN: ICD-10-CM

## 2022-11-02 DIAGNOSIS — M54.16 LUMBAR RADICULOPATHY: ICD-10-CM

## 2022-11-02 DIAGNOSIS — F01.A0 MILD VASCULAR DEMENTIA WITHOUT BEHAVIORAL DISTURBANCE, PSYCHOTIC DISTURBANCE, MOOD DISTURBANCE, OR ANXIETY: ICD-10-CM

## 2022-11-02 DIAGNOSIS — F01.50 VASCULAR DEMENTIA WITHOUT BEHAVIORAL DISTURBANCE (HCC): ICD-10-CM

## 2022-11-02 PROCEDURE — 99214 OFFICE O/P EST MOD 30 MIN: CPT | Performed by: INTERNAL MEDICINE

## 2022-11-02 PROCEDURE — G0438 PPPS, INITIAL VISIT: HCPCS | Performed by: INTERNAL MEDICINE

## 2022-11-02 PROCEDURE — 3074F SYST BP LT 130 MM HG: CPT | Performed by: INTERNAL MEDICINE

## 2022-11-02 PROCEDURE — 3078F DIAST BP <80 MM HG: CPT | Performed by: INTERNAL MEDICINE

## 2022-11-02 PROCEDURE — 1123F ACP DISCUSS/DSCN MKR DOCD: CPT | Performed by: INTERNAL MEDICINE

## 2022-11-02 RX ORDER — VENLAFAXINE HYDROCHLORIDE 150 MG/1
150 CAPSULE, EXTENDED RELEASE ORAL DAILY
Qty: 90 CAPSULE | Refills: 0 | Status: SHIPPED | OUTPATIENT
Start: 2022-11-02

## 2022-11-02 RX ORDER — DONEPEZIL HYDROCHLORIDE 10 MG/1
10 TABLET, FILM COATED ORAL NIGHTLY
Qty: 90 TABLET | Refills: 0 | Status: SHIPPED
Start: 2022-11-02 | End: 2022-11-28 | Stop reason: SDUPTHER

## 2022-11-02 ASSESSMENT — ENCOUNTER SYMPTOMS
ABDOMINAL DISTENTION: 0
EYE DISCHARGE: 0
WHEEZING: 0
SHORTNESS OF BREATH: 0
BLOOD IN STOOL: 0
BACK PAIN: 0
APNEA: 0
CONSTIPATION: 0
ABDOMINAL PAIN: 0
VOMITING: 0
COUGH: 0
NAUSEA: 0
EYE ITCHING: 0
SINUS PAIN: 0
SORE THROAT: 0
TROUBLE SWALLOWING: 0
PHOTOPHOBIA: 0
DIARRHEA: 0

## 2022-11-02 ASSESSMENT — PATIENT HEALTH QUESTIONNAIRE - PHQ9
SUM OF ALL RESPONSES TO PHQ QUESTIONS 1-9: 0
2. FEELING DOWN, DEPRESSED OR HOPELESS: 0
SUM OF ALL RESPONSES TO PHQ9 QUESTIONS 1 & 2: 0
SUM OF ALL RESPONSES TO PHQ QUESTIONS 1-9: 0
1. LITTLE INTEREST OR PLEASURE IN DOING THINGS: 0
SUM OF ALL RESPONSES TO PHQ QUESTIONS 1-9: 0
SUM OF ALL RESPONSES TO PHQ QUESTIONS 1-9: 0

## 2022-11-02 ASSESSMENT — LIFESTYLE VARIABLES: HOW OFTEN DO YOU HAVE A DRINK CONTAINING ALCOHOL: NEVER

## 2022-11-02 NOTE — PROGRESS NOTES
Bard Sands (:  1940) is a [de-identified] y.o. female,Established patient, here for evaluation of the following chief complaint(s):  Medicare AWV and Fall (She has had 2 falls in the last week, and having right hip pain.)      Subjective   SUBJECTIVE/OBJECTIVE:  HPI:  1) pt fell in the store and fell on head , ,pt hurts on back and radiating to rt lower limb,to take tylenol around the clock  2) dementia: increase aricept  3)depression: increase effexor to 150 mg qd  Review of Systems   Constitutional:  Negative for activity change, appetite change, fever and unexpected weight change. HENT:  Negative for congestion, ear pain, hearing loss, sinus pain, sore throat, tinnitus and trouble swallowing. Eyes:  Negative for photophobia, discharge, itching and visual disturbance. Respiratory:  Negative for apnea, cough, shortness of breath and wheezing. Cardiovascular:  Negative for chest pain, palpitations and leg swelling. Gastrointestinal:  Negative for abdominal distention, abdominal pain, blood in stool, constipation, diarrhea, nausea and vomiting. Endocrine: Negative for cold intolerance, polydipsia and polyuria. Genitourinary:  Negative for difficulty urinating, dysuria, frequency and pelvic pain. Musculoskeletal:  Negative for arthralgias, back pain, joint swelling, myalgias, neck pain and neck stiffness. Skin:  Negative for rash and wound. Neurological:  Negative for dizziness, tremors, syncope, light-headedness and headaches.    CBC with Differential:    Lab Results   Component Value Date/Time    WBC 5.1 10/04/2022 11:03 AM    RBC 4.38 10/04/2022 11:03 AM    HGB 14.0 10/04/2022 11:03 AM    HCT 43.6 10/04/2022 11:03 AM     10/04/2022 11:03 AM    MCV 99.5 10/04/2022 11:03 AM    MCH 32.0 10/04/2022 11:03 AM    MCHC 32.1 10/04/2022 11:03 AM    RDW 14.2 10/04/2022 11:03 AM    SEGSPCT 58 10/21/2013 01:00 PM    LYMPHOPCT 17.6 11/10/2019 03:10 PM    MONOPCT 6.1 11/10/2019 03:10 PM    BASOPCT 0.1 11/10/2019 03:10 PM    MONOSABS 0.48 11/10/2019 03:10 PM    LYMPHSABS 1.39 11/10/2019 03:10 PM    EOSABS 0.03 11/10/2019 03:10 PM    BASOSABS 0.01 11/10/2019 03:10 PM     CMP:    Lab Results   Component Value Date/Time     10/04/2022 11:03 AM    K 3.5 10/04/2022 11:03 AM    K 2.9 11/10/2019 03:10 PM     10/04/2022 11:03 AM    CO2 30 10/04/2022 11:03 AM    BUN 12 10/04/2022 11:03 AM    CREATININE 0.7 10/04/2022 11:03 AM    GFRAA >60 10/04/2022 11:03 AM    LABGLOM >60 10/04/2022 11:03 AM    GLUCOSE 88 10/04/2022 11:03 AM    GLUCOSE 99 07/02/2011 05:55 AM    PROT 6.8 11/10/2019 03:10 PM    LABALBU 4.0 11/10/2019 03:10 PM    LABALBU 4.1 06/24/2011 11:45 AM    CALCIUM 9.1 10/04/2022 11:03 AM    BILITOT 0.8 11/10/2019 03:10 PM    ALKPHOS 114 11/10/2019 03:10 PM    AST 48 11/10/2019 03:10 PM    ALT 52 11/10/2019 03:10 PM     Lipid:   Lab Results   Component Value Date    CHOL 131 10/04/2022    CHOL 98 11/11/2019    CHOL 149 11/27/2017     Lab Results   Component Value Date    TRIG 77 10/04/2022    TRIG 64 11/11/2019    TRIG 96 11/27/2017     Lab Results   Component Value Date    HDL 61 10/04/2022    HDL 55 11/11/2019    HDL 44 11/27/2017     Lab Results   Component Value Date    LDLCALC 55 10/04/2022    LDLCALC 30 11/11/2019    LDLCALC 86 11/27/2017     Lab Results   Component Value Date    LABVLDL 15 10/04/2022    LABVLDL 13 11/11/2019    LABVLDL 19 11/27/2017     No results found for: CHOLHDLRATIO         Objective   /68   Pulse 66   Temp 97.6 °F (36.4 °C) (Temporal)   Ht 5' 1\" (1.549 m)   Wt 136 lb 8 oz (61.9 kg)   SpO2 94%   BMI 25.79 kg/m²   Current Outpatient Medications   Medication Sig Dispense Refill    donepezil (ARICEPT) 5 MG tablet TAKE 1 TABLET BY MOUTH NIGHTLY 28 tablet 2    oxybutynin (DITROPAN) 5 MG tablet Take 5 mg by mouth 2 times daily       venlafaxine (EFFEXOR XR) 75 MG extended release capsule 75 mg daily       rivaroxaban (XARELTO) 20 MG TABS tablet Take 1 tablet by mouth daily (with breakfast) 90 tablet 1    Prenatal Vit-Fe Fumarate-FA (PREPLUS) 27-1 MG TABS Take 1 tablet by mouth daily      atorvastatin (LIPITOR) 40 MG tablet Take 1 tablet by mouth nightly 30 tablet 3    acetaminophen (TYLENOL) 325 MG tablet Take 500 mg by mouth every 6 hours as needed for Pain Takes 3 tabs in am and 3 tab at night       No current facility-administered medications for this visit.       Allergies   Allergen Reactions    Demerol Hcl [Meperidine]     Iv Dye [Iodides] Hives        Past Medical History:   Diagnosis Date    Arthritis     Cerebral artery occlusion with cerebral infarction ([facility] [address]     Depression     DVT (deep vein thrombosis) in pregnancy     DVT of lower extremity, bilateral (HCC)     GERD (gastroesophageal reflux disease)     Hx of blood clots     Hyperlipidemia     Hypertension     cured    Nephrolithiasis      Past Surgical History:   Procedure Laterality Date    ABDOMEN SURGERY  2007, 2008    tummy tuck x2    BLADDER SUSPENSION  2011    CHOLECYSTECTOMY, OPEN  3/21/2013    open cholecystectomy with intropertive cholangiogram    COLONOSCOPY      ENDOSCOPY, COLON, DIAGNOSTIC      FOOT SURGERY      GASTRIC BYPASS SURGERY  March, 2006    [facility]    HYSTERECTOMY (CERVIX STATUS UNKNOWN)  42 years ago    AK TOTAL KNEE ARTHROPLASTY N/A 7/31/2018    TOTAL RIGHT KNEE REPLACEMENT ARTHROPLASTY (JOLYNN) performed by Camille Szymanski DO at [facility] OR     Family History   Problem Relation Age of Onset    Cancer Mother         leukemia      Social History     Socioeconomic History    Marital status:      Spouse name: Not on file    Number of children: Not on file    Years of education: Not on file    Highest education level: Not on file   Occupational History    Not on file   Tobacco Use    Smoking status: Never    Smokeless tobacco: Never   Vaping Use    Vaping Use: Never used   Substance and Sexual Activity    Alcohol use: No     Comment: No coffee [employer]    Drug use: No    Sexual activity: Not Currently   Other Topics Concern    Not on file   Social History Narrative    Not on file     Social Determinants of Health     Financial Resource Strain: Low Risk     Difficulty of Paying Living Expenses: Not hard at all   Food Insecurity: No Food Insecurity    Worried About Running Out of Food in the Last Year: Never true    Ran Out of Food in the Last Year: Never true   Transportation Needs: Not on file   Physical Activity: Inactive    Days of Exercise per Week: 0 days    Minutes of Exercise per Session: 0 min   Stress: Not on file   Social Connections: Not on file   Intimate Partner Violence: Not on file   Housing Stability: Not on file      Health Maintenance Due   Topic Date Due    DEXA (modify frequency per FRAX score)  Never done    Pneumococcal 65+ years Vaccine (2 - PPSV23 if available, else PCV20) 06/03/2016        Physical Exam  Constitutional:       Appearance: Normal appearance. She is normal weight. HENT:      Head: Normocephalic and atraumatic. Right Ear: Tympanic membrane and ear canal normal.      Left Ear: Tympanic membrane and ear canal normal.      Nose: Nose normal.      Mouth/Throat:      Mouth: Mucous membranes are moist.      Pharynx: Oropharynx is clear. Eyes:      Extraocular Movements: Extraocular movements intact. Conjunctiva/sclera: Conjunctivae normal.      Pupils: Pupils are equal, round, and reactive to light. Cardiovascular:      Rate and Rhythm: Normal rate and regular rhythm. Pulses: Normal pulses. Heart sounds: Normal heart sounds. Pulmonary:      Effort: Pulmonary effort is normal.      Breath sounds: Normal breath sounds. Abdominal:      General: Abdomen is flat. Bowel sounds are normal.      Palpations: Abdomen is soft. Musculoskeletal:         General: Normal range of motion.       Right shoulder: Normal.      Left shoulder: Normal.      Right upper arm: Normal.      Left upper arm: Normal.      Right elbow: Normal.      Left elbow: Normal.      Right forearm: Normal.      Left forearm: Normal.      Right wrist: Normal.      Left wrist: Normal.      Right hand: Normal.      Left hand: Normal.      Cervical back: Normal, normal range of motion and neck supple. Lumbar back: Normal.   Skin:     General: Skin is warm and dry. Neurological:      General: No focal deficit present. Mental Status: She is alert and oriented to person, place, and time. Mental status is at baseline. Psychiatric:         Mood and Affect: Mood normal.         Behavior: Behavior normal.         Thought Content: Thought content normal.         Judgment: Judgment normal.             ASSESSMENT/PLAN:  1. Initial Medicare annual wellness visit  2. Lumbar radiculopathy  3. Hip pain  4. Vascular dementia without behavioral disturbance ([facility] [address]  The following orders have not been finalized:  -     donepezil (ARICEPT) 10 MG tablet  5. Mild vascular dementia without behavioral disturbance, psychotic disturbance, mood disturbance, or anxiety      Return for Medicare Annual Wellness Visit in 1 year. An electronic signature was used to authenticate this note.     --Yoni Kelley MD

## 2022-11-02 NOTE — PROGRESS NOTES
Medicare Annual Wellness Visit    Early Dominique is here for Medicare AWV and Fall (She has had 2 falls in the last week, and having right hip pain.)    Assessment & Plan   Initial Medicare annual wellness visit    Recommendations for Preventive Services Due: see orders and patient instructions/AVS.  Recommended screening schedule for the next 5-10 years is provided to the patient in written form: see Patient Instructions/AVS.     Return for Medicare Annual Wellness Visit in 1 year. Subjective       Patient's complete Health Risk Assessment and screening values have been reviewed and are found in Flowsheets. The following problems were reviewed today and where indicated follow up appointments were made and/or referrals ordered. Positive Risk Factor Screenings with Interventions:    Fall Risk:  Do you feel unsteady or are you worried about falling? : (!) yes  2 or more falls in past year?: (!) yes  Fall with injury in past year?: (!) yes   Fall Risk Interventions:    Home safety tips provided    Cognitive:   Words recalled: 0 Words Recalled  Clock Drawing Test (CDT): (!) Abnormal  Total Score Interpretation: Abnormal Mini-Cog  Did the patient refuse to take the cognition test?: No  Cognitive Impairment Interventions:         Increase aricept to 10 mg  General Health and ACP:  General  In general, how would you say your health is?: Good  In the past 7 days, have you experienced any of the following: New or Increased Pain, New or Increased Fatigue, Loneliness, Social Isolation, Stress or Anger?: (!) Yes  Select all that apply: (!) New or Increased Pain, Stress  Do you get the social and emotional support that you need?: Yes  Do you have a Living Will?: (!) No    Advance Directives       Power of  Living Will ACP-Advance Directive ACP-Power of     Not on File Filed on 10/21/13 Filed Not on File          General Health Risk Interventions:  Pain issues: home exercises provided    Health Habits/Nutrition:  Physical Activity: Inactive    Days of Exercise per Week: 0 days    Minutes of Exercise per Session: 0 min     Have you lost any weight without trying in the past 3 months?: No  Body mass index: (!) 25.79  Have you seen the dentist within the past year?: (!) No  Health Habits/Nutrition Interventions:  Inadequate physical activity:  patient agrees to exercise for at least 150 minutes/week    Hearing/Vision:  Do you or your family notice any trouble with your hearing that hasn't been managed with hearing aids?: No  Do you have difficulty driving, watching TV, or doing any of your daily activities because of your eyesight?: No  Have you had an eye exam within the past year?: (!) No  No results found.   Hearing/Vision Interventions:  Has hearing problem ,no vision problem with glasses    Safety:  Do you have working smoke detectors?: Yes  Do you have any tripping hazards - loose or unsecured carpets or rugs?: (!) Yes  Do you have any tripping hazards - clutter in doorways, halls, or stairs?: No  Do you have either shower bars, grab bars, non-slip mats or non-slip surfaces in your shower or bathtub?: Yes  Do all of your stairways have a railing or banister?: Yes  Do you always fasten your seatbelt when you are in a car?: Yes  Safety Interventions:  Home safety tips provided    ADLs:  In the past 7 days, did you need help from others to perform any of the following everyday activities: Eating, dressing, grooming, bathing, toileting, or walking/balance?: (!) Yes  Select all that apply: (!) Walking/Balance  In the past 7 days, did you need help from others to take care of any of the following: Laundry, housekeeping, banking/finances, shopping, telephone use, food preparation, transportation, or taking medications?: No  ADL Interventions:  Patient declines any further evaluation/treatment for this issue          Objective   Vitals:    11/02/22 1143   BP: 116/68   Pulse: 66   Temp: 97.6 °F (36.4 °C) TempSrc: Temporal   SpO2: 94%   Weight: 136 lb 8 oz (61.9 kg)   Height: 5' 1\" (1.549 m)      Body mass index is 25.79 kg/m². Allergies   Allergen Reactions    Demerol Hcl [Meperidine]     Iv Dye [Iodides] Hives     Prior to Visit Medications    Medication Sig Taking?  Authorizing Provider   donepezil (ARICEPT) 5 MG tablet TAKE 1 TABLET BY MOUTH NIGHTLY Yes Saúl Campbell MD   oxybutynin (DITROPAN) 5 MG tablet Take 5 mg by mouth 2 times daily  Yes Historical Provider, MD   venlafaxine (EFFEXOR XR) 75 MG extended release capsule 75 mg daily  Yes Historical Provider, MD   rivaroxaban (XARELTO) 20 MG TABS tablet Take 1 tablet by mouth daily (with breakfast) Yes Amado Alexandra MD   Prenatal Vit-Fe Fumarate-FA (PREPLUS) 27-1 MG TABS Take 1 tablet by mouth daily Yes Historical Provider, MD   atorvastatin (LIPITOR) 40 MG tablet Take 1 tablet by mouth nightly Yes Abel Owens MD   acetaminophen (TYLENOL) 325 MG tablet Take 500 mg by mouth every 6 hours as needed for Pain Takes 3 tabs in am and 3 tab at night Yes Historical Provider, MD Leung (Including outside providers/suppliers regularly involved in providing care):   Patient Care Team:  Maranda Estrada MD as PCP - General (Internal Medicine)  Maranda Estrada MD [facility] PCP - [facility]  Amado Alexandra MD as Consulting Physician (Cardiology)     Reviewed and updated this visit:  Tobacco  Allergies  Meds  Med Hx  Surg Hx  Soc Hx  Fam Hx

## 2022-11-02 NOTE — PATIENT INSTRUCTIONS
Personalized Preventive Plan for Holly Lynch - 11/2/2022  Medicare offers a range of preventive health benefits. Some of the tests and screenings are paid in full while other may be subject to a deductible, co-insurance, and/or copay. Some of these benefits include a comprehensive review of your medical history including lifestyle, illnesses that may run in your family, and various assessments and screenings as appropriate. After reviewing your medical record and screening and assessments performed today your provider may have ordered immunizations, labs, imaging, and/or referrals for you. A list of these orders (if applicable) as well as your Preventive Care list are included within your After Visit Summary for your review. Other Preventive Recommendations:    A preventive eye exam performed by an eye specialist is recommended every 1-2 years to screen for glaucoma; cataracts, macular degeneration, and other eye disorders. A preventive dental visit is recommended every 6 months. Try to get at least 150 minutes of exercise per week or 10,000 steps per day on a pedometer . Order or download the FREE \"Exercise & Physical Activity: Your Everyday Guide\" from [employer]. Call 1-993.153.4344 or search [employer] online. You need 7186-0875 mg of calcium and 7241-1408 IU of vitamin D per day. It is possible to meet your calcium requirement with diet alone, but a vitamin D supplement is usually necessary to meet this goal.  When exposed to the sun, use a sunscreen that protects against both UVA and UVB radiation with an SPF of 30 or greater. Reapply every 2 to 3 hours or after sweating, drying off with a towel, or swimming. Always wear a seat belt when traveling in a car. Always wear a helmet when riding a bicycle or motorcycle.

## 2022-11-28 DIAGNOSIS — F01.50 VASCULAR DEMENTIA WITHOUT BEHAVIORAL DISTURBANCE (HCC): ICD-10-CM

## 2022-11-28 RX ORDER — DONEPEZIL HYDROCHLORIDE 5 MG/1
5 TABLET, FILM COATED ORAL NIGHTLY
Qty: 90 TABLET | Refills: 0 | Status: SHIPPED | OUTPATIENT
Start: 2022-11-28 | End: 2023-02-26

## 2022-11-28 NOTE — TELEPHONE ENCOUNTER
Pharmacist called stating pt went to  donepazil and was notified dr increased it from 5mg to 10mg  Pt stated she has tried 10mg before and it makes her very tired/fatigued. Pt prefers to stay on 5mg  Will need new rx sent if dr Keith Khan.

## 2023-04-18 ENCOUNTER — HOSPITAL ENCOUNTER (EMERGENCY)
Age: 83
Discharge: HOME | End: 2023-04-18
Payer: MEDICARE

## 2023-04-18 VITALS
OXYGEN SATURATION: 100 % | SYSTOLIC BLOOD PRESSURE: 136 MMHG | BODY MASS INDEX: 25.51 KG/M2 | HEART RATE: 76 BPM | WEIGHT: 135 LBS | RESPIRATION RATE: 18 BRPM | TEMPERATURE: 97.7 F | DIASTOLIC BLOOD PRESSURE: 73 MMHG

## 2023-04-18 DIAGNOSIS — N39.0 URINARY TRACT INFECTION WITHOUT HEMATURIA, SITE UNSPECIFIED: Primary | ICD-10-CM

## 2023-04-18 LAB
BACTERIA URNS QL MICRO: ABNORMAL /HPF
BILIRUB UR QL STRIP: NEGATIVE
CLARITY UR: ABNORMAL
COLOR UR: YELLOW
GLUCOSE UR STRIP-MCNC: NEGATIVE MG/DL
HGB UR QL STRIP: ABNORMAL
KETONES UR STRIP-MCNC: NEGATIVE MG/DL
LEUKOCYTE ESTERASE UR QL STRIP: ABNORMAL
NITRITE UR QL STRIP: POSITIVE
PH UR STRIP: 5.5 [PH] (ref 5–9)
PROT UR STRIP-MCNC: NEGATIVE MG/DL
RBC #/AREA URNS HPF: ABNORMAL /HPF (ref 0–2)
SP GR UR STRIP: 1.01 (ref 1–1.03)
UROBILINOGEN UR STRIP-ACNC: 0.2 E.U./DL
WBC #/AREA URNS HPF: ABNORMAL /HPF (ref 0–5)

## 2023-04-18 PROCEDURE — 81001 URINALYSIS AUTO W/SCOPE: CPT

## 2023-04-18 PROCEDURE — 87088 URINE BACTERIA CULTURE: CPT

## 2023-04-18 PROCEDURE — 99211 OFF/OP EST MAY X REQ PHY/QHP: CPT

## 2023-04-18 RX ORDER — CEPHALEXIN 500 MG/1
500 CAPSULE ORAL 3 TIMES DAILY
Qty: 21 CAPSULE | Refills: 0 | Status: SHIPPED | OUTPATIENT
Start: 2023-04-18 | End: 2023-04-25

## 2023-04-18 NOTE — ED PROVIDER NOTES
[address] ENCOUNTER        Pt Name: Luh Campbell  MRN: [id number]  [place] 1940  Date of evaluation: 4/18/2023  Provider: CARLO Santana - JEFF  PCP: Teena Kelley MD  Note Started: 5:50 PM EDT 4/18/23    CHIEF COMPLAINT       Chief Complaint   Patient presents with    Urinary Tract Infection     Voiding all the time          HISTORY OF PRESENT ILLNESS: 1 or more Elements   History From: patient    Limitations to history : None    Luh Campbell is a [de-identified] y.o. female who presents for  evaluation. She is having urinary frequency. She does not really have any burning with urination denies any fever back or abdominal pain. The daughter states that she had a UTI 1 other time and was just having frequency no other symptoms. So they brought her in to be evaluated for the possibility of a UTI. Does see a urologist for overactive bladder. Nursing Notes were all reviewed and agreed with or any disagreements were addressed in the HPI. REVIEW OF SYSTEMS :      Review of Systems    Positives and Pertinent negatives as per HPI.      SURGICAL HISTORY     Past Surgical History:   Procedure Laterality Date    ABDOMEN SURGERY  2007, 2008    tummy tuck x2    BLADDER SUSPENSION  2011    CHOLECYSTECTOMY, OPEN  3/21/2013    open cholecystectomy with intropertive cholangiogram    COLONOSCOPY      ENDOSCOPY, COLON, DIAGNOSTIC      FOOT SURGERY      GASTRIC BYPASS SURGERY  March, 2006    [address] [place]    HYSTERECTOMY (CERVIX STATUS UNKNOWN)  42 years ago    [place] ARTHRP KNE CONDYLE&PLATU MEDIAL&LAT COMPARTMENTS N/A 7/31/2018    TOTAL RIGHT KNEE REPLACEMENT ARTHROPLASTY (JOLYNN) performed by Carlos A Han DO at [address]       Previous Medications    ACETAMINOPHEN (TYLENOL) 325 MG TABLET    Take 500 mg by mouth every 6 hours as needed for Pain Takes 3 tabs in am and 3 tab at night    ATORVASTATIN (LIPITOR) 40 MG TABLET    Take

## 2023-04-21 LAB — BACTERIA UR CULT: NORMAL

## 2023-04-30 ENCOUNTER — HOSPITAL ENCOUNTER (INPATIENT)
Age: 83
LOS: 2 days | Discharge: HOME | DRG: 641 | End: 2023-05-02
Attending: EMERGENCY MEDICINE | Admitting: INTERNAL MEDICINE
Payer: MEDICARE

## 2023-04-30 ENCOUNTER — APPOINTMENT (OUTPATIENT)
Dept: CT IMAGING | Age: 83
DRG: 641 | End: 2023-04-30
Payer: MEDICARE

## 2023-04-30 ENCOUNTER — APPOINTMENT (OUTPATIENT)
Dept: GENERAL RADIOLOGY | Age: 83
DRG: 641 | End: 2023-04-30
Payer: MEDICARE

## 2023-04-30 DIAGNOSIS — E87.6 HYPOKALEMIA: Primary | ICD-10-CM

## 2023-04-30 DIAGNOSIS — R41.82 ALTERED MENTAL STATUS, UNSPECIFIED ALTERED MENTAL STATUS TYPE: ICD-10-CM

## 2023-04-30 DIAGNOSIS — E86.0 DEHYDRATION: ICD-10-CM

## 2023-04-30 PROBLEM — E87.1 DEHYDRATION WITH HYPONATREMIA: Status: ACTIVE | Noted: 2023-04-30

## 2023-04-30 PROBLEM — E87.0 DEHYDRATION WITH HYPERNATREMIA: Status: ACTIVE | Noted: 2023-04-30

## 2023-04-30 PROBLEM — Z78.9 UNABLE TO CARE FOR SELF: Status: ACTIVE | Noted: 2023-04-30

## 2023-04-30 PROBLEM — R41.0 CONFUSION: Status: ACTIVE | Noted: 2023-04-30

## 2023-04-30 PROBLEM — F33.9 EPISODE OF RECURRENT MAJOR DEPRESSIVE DISORDER (HCC): Status: ACTIVE | Noted: 2023-04-30

## 2023-04-30 PROBLEM — N32.81 OVERACTIVE BLADDER: Status: ACTIVE | Noted: 2023-04-30

## 2023-04-30 LAB
ALBUMIN SERPL-MCNC: 3.3 G/DL (ref 3.5–5.2)
ALP SERPL-CCNC: 123 U/L (ref 35–104)
ALT SERPL-CCNC: 38 U/L (ref 0–32)
ANION GAP SERPL CALCULATED.3IONS-SCNC: 6 MMOL/L (ref 7–16)
ANION GAP SERPL CALCULATED.3IONS-SCNC: 8 MMOL/L (ref 7–16)
ANION GAP SERPL CALCULATED.3IONS-SCNC: 8 MMOL/L (ref 7–16)
AST SERPL-CCNC: 38 U/L (ref 0–31)
BACTERIA URNS QL MICRO: ABNORMAL /HPF
BASOPHILS # BLD: 0.02 E9/L (ref 0–0.2)
BASOPHILS NFR BLD: 0.3 % (ref 0–2)
BILIRUB SERPL-MCNC: 0.7 MG/DL (ref 0–1.2)
BILIRUB UR QL STRIP: NEGATIVE
BNP BLD-MCNC: 167 PG/ML (ref 0–450)
BUN SERPL-MCNC: 7 MG/DL (ref 6–23)
BUN SERPL-MCNC: 7 MG/DL (ref 6–23)
BUN SERPL-MCNC: 8 MG/DL (ref 6–23)
CALCIUM SERPL-MCNC: 8 MG/DL (ref 8.6–10.2)
CALCIUM SERPL-MCNC: 8.1 MG/DL (ref 8.6–10.2)
CALCIUM SERPL-MCNC: 8.1 MG/DL (ref 8.6–10.2)
CHLORIDE SERPL-SCNC: 100 MMOL/L (ref 98–107)
CHLORIDE SERPL-SCNC: 102 MMOL/L (ref 98–107)
CHLORIDE SERPL-SCNC: 98 MMOL/L (ref 98–107)
CK SERPL-CCNC: 156 U/L (ref 20–180)
CLARITY UR: CLEAR
CO2 SERPL-SCNC: 39 MMOL/L (ref 22–29)
CO2 SERPL-SCNC: 40 MMOL/L (ref 22–29)
CO2 SERPL-SCNC: 40 MMOL/L (ref 22–29)
COLOR UR: YELLOW
CREAT SERPL-MCNC: 0.5 MG/DL (ref 0.5–1)
CREAT SERPL-MCNC: 0.6 MG/DL (ref 0.5–1)
CREAT SERPL-MCNC: 0.6 MG/DL (ref 0.5–1)
EOSINOPHIL # BLD: 0.03 E9/L (ref 0.05–0.5)
EOSINOPHIL NFR BLD: 0.5 % (ref 0–6)
ERYTHROCYTE [DISTWIDTH] IN BLOOD BY AUTOMATED COUNT: 13.8 FL (ref 11.5–15)
GLUCOSE BLD-MCNC: 81 MG/DL
GLUCOSE SERPL-MCNC: 77 MG/DL (ref 74–99)
GLUCOSE SERPL-MCNC: 77 MG/DL (ref 74–99)
GLUCOSE SERPL-MCNC: 83 MG/DL (ref 74–99)
GLUCOSE UR STRIP-MCNC: NEGATIVE MG/DL
HCT VFR BLD AUTO: 39.9 % (ref 34–48)
HGB BLD-MCNC: 12.7 G/DL (ref 11.5–15.5)
HGB UR QL STRIP: ABNORMAL
IMM GRANULOCYTES # BLD: 0.02 E9/L
IMM GRANULOCYTES NFR BLD: 0.3 % (ref 0–5)
KETONES UR STRIP-MCNC: NEGATIVE MG/DL
LEUKOCYTE ESTERASE UR QL STRIP: NEGATIVE
LYMPHOCYTES # BLD: 2.07 E9/L (ref 1.5–4)
LYMPHOCYTES NFR BLD: 31.6 % (ref 20–42)
MAGNESIUM SERPL-MCNC: 1.6 MG/DL (ref 1.6–2.6)
MCH RBC QN AUTO: 30.5 PG (ref 26–35)
MCHC RBC AUTO-ENTMCNC: 31.8 % (ref 32–34.5)
MCV RBC AUTO: 95.9 FL (ref 80–99.9)
METER GLUCOSE: 81 MG/DL (ref 74–99)
MONOCYTES # BLD: 0.51 E9/L (ref 0.1–0.95)
MONOCYTES NFR BLD: 7.8 % (ref 2–12)
NEUTROPHILS # BLD: 3.91 E9/L (ref 1.8–7.3)
NEUTS SEG NFR BLD: 59.5 % (ref 43–80)
NITRITE UR QL STRIP: NEGATIVE
PH UR STRIP: 6.5 [PH] (ref 5–9)
PLATELET # BLD AUTO: 168 E9/L (ref 130–450)
PMV BLD AUTO: 11.3 FL (ref 7–12)
POTASSIUM SERPL-SCNC: 2.1 MMOL/L (ref 3.5–5)
POTASSIUM SERPL-SCNC: 2.2 MMOL/L (ref 3.5–5)
POTASSIUM SERPL-SCNC: 2.2 MMOL/L (ref 3.5–5)
PROT SERPL-MCNC: 5.9 G/DL (ref 6.4–8.3)
PROT UR STRIP-MCNC: NEGATIVE MG/DL
RBC # BLD AUTO: 4.16 E12/L (ref 3.5–5.5)
RBC #/AREA URNS HPF: ABNORMAL /HPF (ref 0–2)
SODIUM SERPL-SCNC: 145 MMOL/L (ref 132–146)
SODIUM SERPL-SCNC: 148 MMOL/L (ref 132–146)
SODIUM SERPL-SCNC: 148 MMOL/L (ref 132–146)
SP GR UR STRIP: <=1.005 (ref 1–1.03)
TROPONIN, HIGH SENSITIVITY: 17 NG/L (ref 0–9)
TROPONIN, HIGH SENSITIVITY: 18 NG/L (ref 0–9)
TSH SERPL-MCNC: 0.92 UIU/ML (ref 0.27–4.2)
UROBILINOGEN UR STRIP-ACNC: 1 E.U./DL
WBC # BLD: 6.6 E9/L (ref 4.5–11.5)
WBC #/AREA URNS HPF: ABNORMAL /HPF (ref 0–5)

## 2023-04-30 PROCEDURE — 82962 GLUCOSE BLOOD TEST: CPT

## 2023-04-30 PROCEDURE — 80053 COMPREHEN METABOLIC PANEL: CPT

## 2023-04-30 PROCEDURE — 6370000000 HC RX 637 (ALT 250 FOR IP): Performed by: INTERNAL MEDICINE

## 2023-04-30 PROCEDURE — 1200000000 HC SEMI PRIVATE

## 2023-04-30 PROCEDURE — 2580000003 HC RX 258: Performed by: INTERNAL MEDICINE

## 2023-04-30 PROCEDURE — 85025 COMPLETE CBC W/AUTO DIFF WBC: CPT

## 2023-04-30 PROCEDURE — 6360000002 HC RX W HCPCS: Performed by: FAMILY MEDICINE

## 2023-04-30 PROCEDURE — 70450 CT HEAD/BRAIN W/O DYE: CPT

## 2023-04-30 PROCEDURE — 87324 CLOSTRIDIUM AG IA: CPT

## 2023-04-30 PROCEDURE — 87449 NOS EACH ORGANISM AG IA: CPT

## 2023-04-30 PROCEDURE — 83735 ASSAY OF MAGNESIUM: CPT

## 2023-04-30 PROCEDURE — 36415 COLL VENOUS BLD VENIPUNCTURE: CPT

## 2023-04-30 PROCEDURE — 99223 1ST HOSP IP/OBS HIGH 75: CPT | Performed by: INTERNAL MEDICINE

## 2023-04-30 PROCEDURE — 71045 X-RAY EXAM CHEST 1 VIEW: CPT

## 2023-04-30 PROCEDURE — 81001 URINALYSIS AUTO W/SCOPE: CPT

## 2023-04-30 PROCEDURE — 6360000002 HC RX W HCPCS: Performed by: EMERGENCY MEDICINE

## 2023-04-30 PROCEDURE — 82550 ASSAY OF CK (CPK): CPT

## 2023-04-30 PROCEDURE — 99285 EMERGENCY DEPT VISIT HI MDM: CPT

## 2023-04-30 PROCEDURE — 6370000000 HC RX 637 (ALT 250 FOR IP): Performed by: FAMILY MEDICINE

## 2023-04-30 PROCEDURE — 93005 ELECTROCARDIOGRAM TRACING: CPT | Performed by: EMERGENCY MEDICINE

## 2023-04-30 PROCEDURE — 84484 ASSAY OF TROPONIN QUANT: CPT

## 2023-04-30 PROCEDURE — 84443 ASSAY THYROID STIM HORMONE: CPT

## 2023-04-30 PROCEDURE — 87088 URINE BACTERIA CULTURE: CPT

## 2023-04-30 PROCEDURE — 6370000000 HC RX 637 (ALT 250 FOR IP): Performed by: EMERGENCY MEDICINE

## 2023-04-30 PROCEDURE — 2580000003 HC RX 258: Performed by: EMERGENCY MEDICINE

## 2023-04-30 PROCEDURE — 80048 BASIC METABOLIC PNL TOTAL CA: CPT

## 2023-04-30 PROCEDURE — 96374 THER/PROPH/DIAG INJ IV PUSH: CPT

## 2023-04-30 PROCEDURE — 83880 ASSAY OF NATRIURETIC PEPTIDE: CPT

## 2023-04-30 RX ORDER — PRENATAL WITH FERROUS FUM AND FOLIC ACID 3080; 920; 120; 400; 22; 1.84; 3; 20; 10; 1; 12; 200; 27; 25; 2 [IU]/1; [IU]/1; MG/1; [IU]/1; MG/1; MG/1; MG/1; MG/1; MG/1; MG/1; UG/1; MG/1; MG/1; MG/1; MG/1
1 TABLET ORAL DAILY
Status: DISCONTINUED | OUTPATIENT
Start: 2023-04-30 | End: 2023-05-02 | Stop reason: HOSPADM

## 2023-04-30 RX ORDER — MAGNESIUM SULFATE 1 G/100ML
1000 INJECTION INTRAVENOUS ONCE
Status: COMPLETED | OUTPATIENT
Start: 2023-04-30 | End: 2023-04-30

## 2023-04-30 RX ORDER — SODIUM CHLORIDE, SODIUM LACTATE, POTASSIUM CHLORIDE, CALCIUM CHLORIDE 600; 310; 30; 20 MG/100ML; MG/100ML; MG/100ML; MG/100ML
INJECTION, SOLUTION INTRAVENOUS CONTINUOUS
Status: DISCONTINUED | OUTPATIENT
Start: 2023-04-30 | End: 2023-05-01

## 2023-04-30 RX ORDER — SODIUM CHLORIDE 0.9 % (FLUSH) 0.9 %
5-40 SYRINGE (ML) INJECTION PRN
Status: DISCONTINUED | OUTPATIENT
Start: 2023-04-30 | End: 2023-05-02 | Stop reason: HOSPADM

## 2023-04-30 RX ORDER — SODIUM CHLORIDE 9 MG/ML
25 INJECTION, SOLUTION INTRAVENOUS PRN
Status: DISCONTINUED | OUTPATIENT
Start: 2023-04-30 | End: 2023-05-02 | Stop reason: HOSPADM

## 2023-04-30 RX ORDER — VENLAFAXINE HYDROCHLORIDE 150 MG/1
150 CAPSULE, EXTENDED RELEASE ORAL DAILY
Status: DISCONTINUED | OUTPATIENT
Start: 2023-05-01 | End: 2023-05-02 | Stop reason: HOSPADM

## 2023-04-30 RX ORDER — OXYBUTYNIN CHLORIDE 5 MG/1
5 TABLET ORAL 2 TIMES DAILY
Status: DISCONTINUED | OUTPATIENT
Start: 2023-04-30 | End: 2023-05-02 | Stop reason: HOSPADM

## 2023-04-30 RX ORDER — ATORVASTATIN CALCIUM 40 MG/1
40 TABLET, FILM COATED ORAL NIGHTLY
Status: DISCONTINUED | OUTPATIENT
Start: 2023-04-30 | End: 2023-05-02 | Stop reason: HOSPADM

## 2023-04-30 RX ORDER — ACETAMINOPHEN 650 MG/1
650 SUPPOSITORY RECTAL EVERY 6 HOURS PRN
Status: DISCONTINUED | OUTPATIENT
Start: 2023-04-30 | End: 2023-05-02 | Stop reason: HOSPADM

## 2023-04-30 RX ORDER — SODIUM CHLORIDE 0.9 % (FLUSH) 0.9 %
5-40 SYRINGE (ML) INJECTION EVERY 12 HOURS SCHEDULED
Status: DISCONTINUED | OUTPATIENT
Start: 2023-04-30 | End: 2023-05-02 | Stop reason: HOSPADM

## 2023-04-30 RX ORDER — POTASSIUM CHLORIDE 7.45 MG/ML
10 INJECTION INTRAVENOUS
Status: DISCONTINUED | OUTPATIENT
Start: 2023-04-30 | End: 2023-05-01

## 2023-04-30 RX ORDER — DONEPEZIL HYDROCHLORIDE 5 MG/1
5 TABLET, FILM COATED ORAL NIGHTLY
Status: DISCONTINUED | OUTPATIENT
Start: 2023-04-30 | End: 2023-05-02 | Stop reason: HOSPADM

## 2023-04-30 RX ORDER — 0.9 % SODIUM CHLORIDE 0.9 %
1000 INTRAVENOUS SOLUTION INTRAVENOUS ONCE
Status: COMPLETED | OUTPATIENT
Start: 2023-04-30 | End: 2023-04-30

## 2023-04-30 RX ORDER — POTASSIUM CHLORIDE 7.45 MG/ML
10 INJECTION INTRAVENOUS
Status: COMPLETED | OUTPATIENT
Start: 2023-04-30 | End: 2023-04-30

## 2023-04-30 RX ORDER — POTASSIUM CHLORIDE 7.45 MG/ML
10 INJECTION INTRAVENOUS
Status: DISCONTINUED | OUTPATIENT
Start: 2023-04-30 | End: 2023-04-30 | Stop reason: CLARIF

## 2023-04-30 RX ORDER — ACETAMINOPHEN 325 MG/1
650 TABLET ORAL EVERY 6 HOURS PRN
Status: DISCONTINUED | OUTPATIENT
Start: 2023-04-30 | End: 2023-05-02 | Stop reason: HOSPADM

## 2023-04-30 RX ORDER — ONDANSETRON 2 MG/ML
4 INJECTION INTRAMUSCULAR; INTRAVENOUS EVERY 6 HOURS PRN
Status: DISCONTINUED | OUTPATIENT
Start: 2023-04-30 | End: 2023-05-02 | Stop reason: HOSPADM

## 2023-04-30 RX ORDER — ONDANSETRON 4 MG/1
4 TABLET, ORALLY DISINTEGRATING ORAL EVERY 8 HOURS PRN
Status: DISCONTINUED | OUTPATIENT
Start: 2023-04-30 | End: 2023-05-02 | Stop reason: HOSPADM

## 2023-04-30 RX ORDER — POLYETHYLENE GLYCOL 3350 17 G/17G
17 POWDER, FOR SOLUTION ORAL DAILY PRN
Status: DISCONTINUED | OUTPATIENT
Start: 2023-04-30 | End: 2023-05-02 | Stop reason: HOSPADM

## 2023-04-30 RX ADMIN — POTASSIUM BICARBONATE 50 MEQ: 978 TABLET, EFFERVESCENT ORAL at 13:27

## 2023-04-30 RX ADMIN — SODIUM CHLORIDE 1000 ML: 9 INJECTION, SOLUTION INTRAVENOUS at 13:27

## 2023-04-30 RX ADMIN — POTASSIUM CHLORIDE 10 MEQ: 7.46 INJECTION, SOLUTION INTRAVENOUS at 13:26

## 2023-04-30 RX ADMIN — POTASSIUM CHLORIDE 10 MEQ: 7.46 INJECTION, SOLUTION INTRAVENOUS at 23:06

## 2023-04-30 RX ADMIN — POTASSIUM BICARBONATE 50 MEQ: 978 TABLET, EFFERVESCENT ORAL at 21:55

## 2023-04-30 RX ADMIN — ATORVASTATIN CALCIUM 40 MG: 40 TABLET, FILM COATED ORAL at 22:02

## 2023-04-30 RX ADMIN — POTASSIUM CHLORIDE 10 MEQ: 7.46 INJECTION, SOLUTION INTRAVENOUS at 17:40

## 2023-04-30 RX ADMIN — DONEPEZIL HYDROCHLORIDE 5 MG: 5 TABLET, FILM COATED ORAL at 22:02

## 2023-04-30 RX ADMIN — MAGNESIUM SULFATE HEPTAHYDRATE 1000 MG: 1 INJECTION, SOLUTION INTRAVENOUS at 21:46

## 2023-04-30 RX ADMIN — SODIUM CHLORIDE, PRESERVATIVE FREE 10 ML: 5 INJECTION INTRAVENOUS at 22:02

## 2023-04-30 RX ADMIN — SODIUM CHLORIDE, POTASSIUM CHLORIDE, SODIUM LACTATE AND CALCIUM CHLORIDE: 600; 310; 30; 20 INJECTION, SOLUTION INTRAVENOUS at 15:31

## 2023-04-30 RX ADMIN — ACETAMINOPHEN 650 MG: 325 TABLET ORAL at 15:27

## 2023-04-30 ASSESSMENT — PAIN - FUNCTIONAL ASSESSMENT: PAIN_FUNCTIONAL_ASSESSMENT: NONE - DENIES PAIN

## 2023-04-30 ASSESSMENT — PAIN DESCRIPTION - DESCRIPTORS: DESCRIPTORS: DULL

## 2023-04-30 ASSESSMENT — PAIN SCALES - GENERAL
PAINLEVEL_OUTOF10: 0
PAINLEVEL_OUTOF10: 1

## 2023-04-30 ASSESSMENT — LIFESTYLE VARIABLES
HOW MANY STANDARD DRINKS CONTAINING ALCOHOL DO YOU HAVE ON A TYPICAL DAY: PATIENT DOES NOT DRINK
HOW MANY STANDARD DRINKS CONTAINING ALCOHOL DO YOU HAVE ON A TYPICAL DAY: PATIENT DOES NOT DRINK
HOW OFTEN DO YOU HAVE A DRINK CONTAINING ALCOHOL: NEVER

## 2023-04-30 ASSESSMENT — PAIN DESCRIPTION - LOCATION: LOCATION: HEAD

## 2023-05-01 LAB
ANION GAP SERPL CALCULATED.3IONS-SCNC: 6 MMOL/L (ref 7–16)
ANION GAP SERPL CALCULATED.3IONS-SCNC: 7 MMOL/L (ref 7–16)
ANION GAP SERPL CALCULATED.3IONS-SCNC: 7 MMOL/L (ref 7–16)
ANION GAP SERPL CALCULATED.3IONS-SCNC: 8 MMOL/L (ref 7–16)
BUN SERPL-MCNC: 5 MG/DL (ref 6–23)
BUN SERPL-MCNC: 6 MG/DL (ref 6–23)
BUN SERPL-MCNC: 6 MG/DL (ref 6–23)
BUN SERPL-MCNC: 7 MG/DL (ref 6–23)
C DIFF TOXIN/ANTIGEN: NORMAL
CALCIUM SERPL-MCNC: 7.9 MG/DL (ref 8.6–10.2)
CALCIUM SERPL-MCNC: 8 MG/DL (ref 8.6–10.2)
CALCIUM SERPL-MCNC: 8.1 MG/DL (ref 8.6–10.2)
CALCIUM SERPL-MCNC: 8.1 MG/DL (ref 8.6–10.2)
CHLORIDE SERPL-SCNC: 100 MMOL/L (ref 98–107)
CHLORIDE SERPL-SCNC: 97 MMOL/L (ref 98–107)
CHLORIDE SERPL-SCNC: 99 MMOL/L (ref 98–107)
CHLORIDE SERPL-SCNC: 99 MMOL/L (ref 98–107)
CO2 SERPL-SCNC: 36 MMOL/L (ref 22–29)
CO2 SERPL-SCNC: 37 MMOL/L (ref 22–29)
CREAT SERPL-MCNC: 0.5 MG/DL (ref 0.5–1)
CREAT SERPL-MCNC: 0.6 MG/DL (ref 0.5–1)
GLUCOSE SERPL-MCNC: 102 MG/DL (ref 74–99)
GLUCOSE SERPL-MCNC: 63 MG/DL (ref 74–99)
GLUCOSE SERPL-MCNC: 66 MG/DL (ref 74–99)
GLUCOSE SERPL-MCNC: 78 MG/DL (ref 74–99)
POTASSIUM SERPL-SCNC: 2.7 MMOL/L (ref 3.5–5)
POTASSIUM SERPL-SCNC: 3.3 MMOL/L (ref 3.5–5)
POTASSIUM SERPL-SCNC: 3.3 MMOL/L (ref 3.5–5)
POTASSIUM SERPL-SCNC: 3.6 MMOL/L (ref 3.5–5)
SODIUM SERPL-SCNC: 141 MMOL/L (ref 132–146)
SODIUM SERPL-SCNC: 142 MMOL/L (ref 132–146)
SODIUM SERPL-SCNC: 142 MMOL/L (ref 132–146)
SODIUM SERPL-SCNC: 143 MMOL/L (ref 132–146)

## 2023-05-01 PROCEDURE — 97161 PT EVAL LOW COMPLEX 20 MIN: CPT | Performed by: PHYSICAL THERAPIST

## 2023-05-01 PROCEDURE — 6370000000 HC RX 637 (ALT 250 FOR IP): Performed by: INTERNAL MEDICINE

## 2023-05-01 PROCEDURE — 97165 OT EVAL LOW COMPLEX 30 MIN: CPT

## 2023-05-01 PROCEDURE — 97535 SELF CARE MNGMENT TRAINING: CPT

## 2023-05-01 PROCEDURE — 93005 ELECTROCARDIOGRAM TRACING: CPT | Performed by: FAMILY MEDICINE

## 2023-05-01 PROCEDURE — 2580000003 HC RX 258: Performed by: FAMILY MEDICINE

## 2023-05-01 PROCEDURE — 99233 SBSQ HOSP IP/OBS HIGH 50: CPT | Performed by: INTERNAL MEDICINE

## 2023-05-01 PROCEDURE — 6370000000 HC RX 637 (ALT 250 FOR IP): Performed by: FAMILY MEDICINE

## 2023-05-01 PROCEDURE — 6360000002 HC RX W HCPCS: Performed by: FAMILY MEDICINE

## 2023-05-01 PROCEDURE — 6360000002 HC RX W HCPCS: Performed by: INTERNAL MEDICINE

## 2023-05-01 PROCEDURE — 36415 COLL VENOUS BLD VENIPUNCTURE: CPT

## 2023-05-01 PROCEDURE — 80048 BASIC METABOLIC PNL TOTAL CA: CPT

## 2023-05-01 PROCEDURE — 2580000003 HC RX 258: Performed by: INTERNAL MEDICINE

## 2023-05-01 PROCEDURE — 1200000000 HC SEMI PRIVATE

## 2023-05-01 PROCEDURE — 97116 GAIT TRAINING THERAPY: CPT | Performed by: PHYSICAL THERAPIST

## 2023-05-01 RX ORDER — POTASSIUM CHLORIDE 7.45 MG/ML
10 INJECTION INTRAVENOUS
Status: DISPENSED | OUTPATIENT
Start: 2023-05-01 | End: 2023-05-01

## 2023-05-01 RX ORDER — SODIUM CHLORIDE AND POTASSIUM CHLORIDE 300; 900 MG/100ML; MG/100ML
INJECTION, SOLUTION INTRAVENOUS CONTINUOUS
Status: DISCONTINUED | OUTPATIENT
Start: 2023-05-01 | End: 2023-05-02 | Stop reason: HOSPADM

## 2023-05-01 RX ORDER — SODIUM CHLORIDE, SODIUM LACTATE, POTASSIUM CHLORIDE, CALCIUM CHLORIDE 600; 310; 30; 20 MG/100ML; MG/100ML; MG/100ML; MG/100ML
INJECTION, SOLUTION INTRAVENOUS CONTINUOUS
Status: DISCONTINUED | OUTPATIENT
Start: 2023-05-01 | End: 2023-05-01

## 2023-05-01 RX ORDER — POTASSIUM CHLORIDE 20 MEQ/1
40 TABLET, EXTENDED RELEASE ORAL ONCE
Status: COMPLETED | OUTPATIENT
Start: 2023-05-01 | End: 2023-05-01

## 2023-05-01 RX ORDER — MAGNESIUM SULFATE IN WATER 40 MG/ML
2000 INJECTION, SOLUTION INTRAVENOUS ONCE
Status: COMPLETED | OUTPATIENT
Start: 2023-05-01 | End: 2023-05-01

## 2023-05-01 RX ADMIN — POTASSIUM CHLORIDE 10 MEQ: 7.46 INJECTION, SOLUTION INTRAVENOUS at 04:53

## 2023-05-01 RX ADMIN — POTASSIUM CHLORIDE 10 MEQ: 7.46 INJECTION, SOLUTION INTRAVENOUS at 06:20

## 2023-05-01 RX ADMIN — SODIUM CHLORIDE, PRESERVATIVE FREE 10 ML: 5 INJECTION INTRAVENOUS at 09:06

## 2023-05-01 RX ADMIN — POTASSIUM CHLORIDE AND SODIUM CHLORIDE: 900; 300 INJECTION, SOLUTION INTRAVENOUS at 16:22

## 2023-05-01 RX ADMIN — RIVAROXABAN 20 MG: 20 TABLET, FILM COATED ORAL at 09:05

## 2023-05-01 RX ADMIN — POTASSIUM CHLORIDE 10 MEQ: 7.46 INJECTION, SOLUTION INTRAVENOUS at 01:58

## 2023-05-01 RX ADMIN — POTASSIUM BICARBONATE 50 MEQ: 978 TABLET, EFFERVESCENT ORAL at 05:00

## 2023-05-01 RX ADMIN — MAGNESIUM SULFATE HEPTAHYDRATE 2000 MG: 40 INJECTION, SOLUTION INTRAVENOUS at 03:23

## 2023-05-01 RX ADMIN — POTASSIUM CHLORIDE 10 MEQ: 7.46 INJECTION, SOLUTION INTRAVENOUS at 00:29

## 2023-05-01 RX ADMIN — SODIUM CHLORIDE, POTASSIUM CHLORIDE, SODIUM LACTATE AND CALCIUM CHLORIDE: 600; 310; 30; 20 INJECTION, SOLUTION INTRAVENOUS at 03:19

## 2023-05-01 RX ADMIN — VENLAFAXINE HYDROCHLORIDE 150 MG: 150 CAPSULE, EXTENDED RELEASE ORAL at 09:06

## 2023-05-01 RX ADMIN — DONEPEZIL HYDROCHLORIDE 5 MG: 5 TABLET, FILM COATED ORAL at 20:02

## 2023-05-01 RX ADMIN — POTASSIUM CHLORIDE 40 MEQ: 1500 TABLET, EXTENDED RELEASE ORAL at 16:17

## 2023-05-01 RX ADMIN — ATORVASTATIN CALCIUM 40 MG: 40 TABLET, FILM COATED ORAL at 20:02

## 2023-05-01 RX ADMIN — PRENATAL WITH FERROUS FUM AND FOLIC ACID 1 TABLET: 3080; 920; 120; 400; 22; 1.84; 3; 20; 10; 1; 12; 200; 27; 25; 2 TABLET ORAL at 09:08

## 2023-05-01 RX ADMIN — POTASSIUM CHLORIDE 10 MEQ: 7.46 INJECTION, SOLUTION INTRAVENOUS at 03:17

## 2023-05-01 NOTE — DISCHARGE INSTRUCTIONS
Your information:  Name: Ariadna Sidhu  : 1940    Your instructions: [facility]([phone] )-will call to schedule home care visit    You are being discharged home with [facility]. Please follow up with your physician at discharge and take your medications as prescribed. IF YOU EXPERIENCE ANY OF THE FOLLOWING SYMPTOMS, CHEST PAIN, SHORTNESS OF BREATH, COUGHING UP BLOOD OR BLOODY SPUTUM, STOMACH PAIN OR CRAMPING, DARK, TARRY STOOLS, LOSS OF APPETITE, GENERAL NOT FEELING WELL, SIGNS AND SYMPTOMS OF INFECTION LIKE FEVER AND OR CHILLS, PLEASE CALL DR HASSAN OR RETURN TO THE EMERGENCY ROOM. What to do after you leave the hospital:    Recommended diet: {diet:13018}    Recommended activity: {discharge activity:94812}        The following personal items were collected during your admission and were returned to you:    Belongings  Dental Appliances: Uppers  Vision - Corrective Lenses: Eyeglasses  Hearing Aid: None  Clothing: Footwear, Jacket/Coat, Pants, Shirt, Undergarments  Jewelry: None  Electronic Devices: None  Weapons (Notify Protective Services/Security): None  Home Medications: None  Valuables Given To: Family (Comment)  Provide Name(s) of Who Valuable(s) Were Given To: daughters  Responsible person(s) in the waiting room: NA    Information obtained by:  By signing below, I understand that if any problems occur once I leave the hospital I am to contact ***. I understand and acknowledge receipt of the instructions indicated above.

## 2023-05-01 NOTE — PROGRESS NOTES
[address] [facility]  [facility] [place]ba [place]. [place]        Date:2023                                                  Patient Name: Rox Martin    MRN: [id number]    : 1940    Room: [address] [place] [address]      Evaluating OT: Isobel Peabody OTR/L; #[id number]     Referring Provider and Specific Provider Orders/Date:      23 141  OT eval and treat  Start:  23,   End:  23 141,   ONE TIME,   Standing Count:  1 Occurrences,   R         Deb Welsh DO      Placement Recommendation: Subacute       Diagnosis:   1. Hypokalemia    2. Altered mental status, unspecified altered mental status type    3. Dehydration         Surgery: No      Pertinent Medical History:       Past Medical History:   Diagnosis Date    Arthritis     Cerebral artery occlusion with cerebral infarction ([facility] [address]     Depression     DVT (deep vein thrombosis) in pregnancy     DVT of lower extremity, bilateral (HCC)     GERD (gastroesophageal reflux disease)     Hx of blood clots     Hyperlipidemia     Hypertension     cured    Nephrolithiasis          Past Surgical History:   Procedure Laterality Date    ABDOMEN SURGERY  ,     tummy tuck x2    BLADDER SUSPENSION      CHOLECYSTECTOMY, OPEN  3/21/2013    open cholecystectomy with intropertive cholangiogram    COLONOSCOPY      ENDOSCOPY, COLON, DIAGNOSTIC      FOOT SURGERY      GASTRIC BYPASS SURGERY      [address] [place]    HYSTERECTOMY ([address])  42 years ago    [place] ARTHRP KNE CONDYLE&PLATU MEDIAL&LAT COMPARTMENTS N/A 2018    TOTAL RIGHT KNEE REPLACEMENT ARTHROPLASTY (JOLYNN) performed by Darien Ha DO at [facility] OR        Precautions:   Up with Assistance, Fall Risk, Contact and Airborne     Assessment of current deficits:     [x] Functional mobility  [x]ADLs  [x] Strength               []Cognition    [x] Functional transfers   [x] IADLs

## 2023-05-01 NOTE — CONSULTS
PSYCHIATRY ATTENDING CONSULT    REASON FOR CONSULT: Uncontrolled depression    REQUESTING PHYSICIAN:  Dr. Anastasia Urrutia: \"I am feeling much better. I myself again\"    HISTORY OF PRESENT ILLNESS:  Mini Miguel  is a [de-identified] y.o. female who was admitted on 23 due to hypokalemia, dehydration and confusion the last few days. Chart reviewed. Note home psychotropics of Effexor  mg daily. QTc 462. History of arthritis, mild vascular dementia, depression, DVT, GERD, hypertension, nephrolithiasis and hyperlipidemia. She was seen in the ED on  for UTI and did not complete her 7-day course. On assessment patient is sitting up in bed alert and oriented pleasant and cooperative. She states \"I am myself again. \"  Patient states that she scared her family due to not remembering that her son passed away 8 years ago. She states that she woke up and was thinking about her  son that  from a drug overdose about 8 years ago and asked for his number. \"I know my son passed away but for some reason he was on my mind and I thought he was alive. \"  Patient states that she has a very strong family unit and is very grateful and happy. She denies depression currently and states \"as a rule I am not depressed, but at times everyone gets sad. \"  She denies feeling signs of dysphoria or anhedonia and has hope for her future. She states she has a big family and \"lots of grandchildren that she loves. \"  Patient states that her anxiety is due to \"normal stuff but it is not bad or a problem. \"  She reports that her  is her caretaker but he is currently in a rehab facility so two daughters have been taking care of her. Patient states that she has been sleeping \"for the most part. \"  But states that her appetite has been decreased due to the medications that she has been taking. \"I am a small eater anyway. \"  Patient is not suicidal, homicidal, manic or psychotic.   No voiced delusions or

## 2023-05-01 NOTE — PROGRESS NOTES
[address] [place], [place] [address]ist   Progress Note    Admitting Date and Time: 4/30/2023 10:59 AM  Admit Dx: Dehydration [E86.0]  Hypokalemia [E87.6]  Altered mental status, unspecified altered mental status type [R41.82]    Subjective:    Pt feels better. She is sitting up in chair. Home care company liaison at bedside. I asked her a few questions and she answered \"my memory sucks! \"     Per RN:  room air O2 sat 94-99%.      atorvastatin  40 mg Oral Nightly    donepezil  5 mg Oral Nightly    [Held by provider] oxybutynin  5 mg Oral BID    prenatal vitamin  1 tablet Oral Daily    rivaroxaban  20 mg Oral Daily with breakfast    venlafaxine  150 mg Oral Daily    sodium chloride flush  5-40 mL IntraVENous 2 times per day     sodium chloride flush, 5-40 mL, PRN  sodium chloride, 25 mL, PRN  ondansetron, 4 mg, Q8H PRN   Or  ondansetron, 4 mg, Q6H PRN  polyethylene glycol, 17 g, Daily PRN  acetaminophen, 650 mg, Q6H PRN   Or  acetaminophen, 650 mg, Q6H PRN         Objective:    BP (!) 112/58   Pulse 72   Temp 97.6 °F (36.4 °C) (Oral)   Resp 16   Ht 5' (1.524 m)   Wt 136 lb 1.6 oz (61.7 kg)   SpO2 94%   BMI 26.58 kg/m²   General Appearance: alert and oriented to person, place and time and in no acute distress  Skin: warm and dry  Head: normocephalic and atraumatic  Eyes: pupils equal, round, and reactive to light, extraocular eye movements intact, conjunctivae normal  Neck: neck supple and non tender without mass   Pulmonary/Chest: clear to auscultation bilaterally- no wheezes, rales or rhonchi, normal air movement, no respiratory distress  Cardiovascular: normal rate, normal S1 and S2 and no carotid bruits  Abdomen: soft, non-tender, non-distended, normal bowel sounds, no masses or organomegaly  Extremities: no cyanosis, no clubbing and no edema  Neurologic: no cranial nerve deficit and speech normal      Recent Labs     05/01/23  0511 05/01/23  1047 05/01/23  1510    143 141   K 3.6 3.3* 3.3*   CL 99 100

## 2023-05-01 NOTE — ACP (ADVANCE CARE PLANNING)
Advance Care Planning   Healthcare Decision Maker:    Primary Decision Maker: Ny Milagros Spouse - [phone]    Secondary Decision Maker: Divina Ceavllos - Child - [phone]    Supplemental (Other) Decision Maker: Charles Romano - Child - [phone]

## 2023-05-01 NOTE — PROGRESS NOTES
Physical Therapy    Physical Therapy Initial Evaluation/Plan of Care    Room #:  3388/8329-05  Patient Name: Marianne Valencia  YOB: 1940  MRN: [id number]    Date of Service: 5/1/2023     Tentative placement recommendation: Home with [address] [place] [address] Rd and family support  Equipment recommendation: Patient has needed equipment       Evaluating Physical Therapist: Abbie Rossi  #[id number]      Specific Provider Orders/Date/Referring Provider :  04/30/23 1415    PT eval and treat  Start:  04/30/23 1415,   End:  04/30/23 1415,   ONE TIME,   Standing Count:  1 Occurrences,   R         Vikas Daigle DO     Admitting Diagnosis:   Dehydration [E86.0]  Hypokalemia [E87.6]  Altered mental status, unspecified altered mental status type [R41.82]    Admitted with    altered mental status , as above  Surgery: none  Visit Diagnoses         Codes    Altered mental status, unspecified altered mental status type     R41.82    Dehydration     E86.0            Patient Active Problem List   Diagnosis    Hypertension    History of DVT (deep vein thrombosis)    Cerebrovascular accident (CVA) ([facility] [address]    Hyperlipidemia    Mild vascular dementia without behavioral disturbance, psychotic disturbance, mood disturbance, or anxiety (HCC)    Hypokalemia    Dehydration with hypernatremia    Confusion    Episode of recurrent major depressive disorder ([facility] [address]    Unable to care for self    Overactive bladder        ASSESSMENT of Current Deficits Patient exhibits decreased strength, balance, and endurance impairing functional mobility, transfers, gait , gait distance, and tolerance to activity . Pt will benefit from home p.t. to address above issues.        PHYSICAL THERAPY  PLAN OF CARE       Physical therapy plan of care is established based on physician order,  patient diagnosis and clinical assessment    Current Treatment Recommendations:    -Bed Mobility: Lower extremity exercises , Upper extremity exercises , and

## 2023-05-01 NOTE — CARE COORDINATION
Case Management Assessment  Initial Evaluation    Date/Time of Evaluation: 5/1/2023 2:06 PM  Assessment Completed by: Ines Virk RN    If patient is discharged prior to next notation, then this note serves as note for discharge by case management. Patient Name: Catalina Forrest                   YOB: 1940  Diagnosis: Dehydration [E86.0]  Hypokalemia [E87.6]  Altered mental status, unspecified altered mental status type [R41.82]                   Date / Time: 4/30/2023 10:59 AM    Patient Admission Status: Inpatient   Readmission Risk (Low < 19, Mod (19-27), High > 27): Readmission Risk Score: 11.6    Current PCP: Qing Newman MD  PCP verified by CM? Yes    Chart Reviewed: Yes      History Provided by: Patient, Child/Family  Patient Orientation: Alert and Oriented, Person, Place    Patient Cognition: Dementia / Early Alzheimer's    Hospitalization in the last 30 days (Readmission):  No    If yes, Readmission Assessment in  Navigator will be completed.     Advance Directives:      Code Status: Full Code   Patient's Primary Decision Maker is: Legal Next of Kin    Primary Decision MakerFredkayley Grey Spouse - [phone]    Secondary Decision Maker: Rebecca Mendoza - Child - [phone]    Supplemental (Other) Decision Maker: Aliza Barraza - Child - [phone]    Discharge Planning:    Patient lives with: Spouse/Significant Other Type of Home: House  Primary Care Giver: Family  Patient Support Systems include: Spouse/Significant Other, Children, Family Members   Current Financial resources: Medicare  Current community resources:    Current services prior to admission: None            Current DME:              Type of Home Care services:  OT, PT, Aide Services    ADLS  Prior functional level: Assistance with the following:, Housework, Cooking  Current functional level: Assistance with the following:, Cooking, Housework    PT AM-PAC: 18 /24  OT AM-PAC: 18 /24    Family can provide assistance

## 2023-05-02 VITALS
DIASTOLIC BLOOD PRESSURE: 70 MMHG | HEART RATE: 74 BPM | BODY MASS INDEX: 26.72 KG/M2 | HEIGHT: 60 IN | SYSTOLIC BLOOD PRESSURE: 149 MMHG | RESPIRATION RATE: 16 BRPM | TEMPERATURE: 98 F | WEIGHT: 136.1 LBS | OXYGEN SATURATION: 97 %

## 2023-05-02 LAB
ALBUMIN SERPL-MCNC: 3 G/DL (ref 3.5–5.2)
ALP SERPL-CCNC: 115 U/L (ref 35–104)
ALT SERPL-CCNC: 31 U/L (ref 0–32)
ANION GAP SERPL CALCULATED.3IONS-SCNC: 9 MMOL/L (ref 7–16)
AST SERPL-CCNC: 35 U/L (ref 0–31)
BACTERIA UR CULT: NORMAL
BASOPHILS # BLD: 0.01 E9/L (ref 0–0.2)
BASOPHILS NFR BLD: 0.2 % (ref 0–2)
BILIRUB SERPL-MCNC: 0.8 MG/DL (ref 0–1.2)
BUN SERPL-MCNC: 6 MG/DL (ref 6–23)
CALCIUM SERPL-MCNC: 8.2 MG/DL (ref 8.6–10.2)
CHLORIDE SERPL-SCNC: 104 MMOL/L (ref 98–107)
CO2 SERPL-SCNC: 32 MMOL/L (ref 22–29)
CREAT SERPL-MCNC: 0.6 MG/DL (ref 0.5–1)
EKG ATRIAL RATE: 60 BPM
EKG ATRIAL RATE: 67 BPM
EKG P AXIS: 69 DEGREES
EKG P AXIS: 77 DEGREES
EKG P-R INTERVAL: 172 MS
EKG P-R INTERVAL: 186 MS
EKG Q-T INTERVAL: 438 MS
EKG Q-T INTERVAL: 456 MS
EKG QRS DURATION: 74 MS
EKG QRS DURATION: 84 MS
EKG QTC CALCULATION (BAZETT): 456 MS
EKG QTC CALCULATION (BAZETT): 462 MS
EKG R AXIS: 44 DEGREES
EKG R AXIS: 55 DEGREES
EKG T AXIS: 17 DEGREES
EKG T AXIS: 54 DEGREES
EKG VENTRICULAR RATE: 60 BPM
EKG VENTRICULAR RATE: 67 BPM
EOSINOPHIL # BLD: 0.04 E9/L (ref 0.05–0.5)
EOSINOPHIL NFR BLD: 0.7 % (ref 0–6)
ERYTHROCYTE [DISTWIDTH] IN BLOOD BY AUTOMATED COUNT: 14.3 FL (ref 11.5–15)
GLUCOSE SERPL-MCNC: 65 MG/DL (ref 74–99)
HCT VFR BLD AUTO: 38.5 % (ref 34–48)
HGB BLD-MCNC: 12.1 G/DL (ref 11.5–15.5)
IMM GRANULOCYTES # BLD: 0.02 E9/L
IMM GRANULOCYTES NFR BLD: 0.3 % (ref 0–5)
LYMPHOCYTES # BLD: 2.96 E9/L (ref 1.5–4)
LYMPHOCYTES NFR BLD: 48.4 % (ref 20–42)
MAGNESIUM SERPL-MCNC: 2.1 MG/DL (ref 1.6–2.6)
MCH RBC QN AUTO: 30.3 PG (ref 26–35)
MCHC RBC AUTO-ENTMCNC: 31.4 % (ref 32–34.5)
MCV RBC AUTO: 96.5 FL (ref 80–99.9)
MONOCYTES # BLD: 0.5 E9/L (ref 0.1–0.95)
MONOCYTES NFR BLD: 8.2 % (ref 2–12)
NEUTROPHILS # BLD: 2.59 E9/L (ref 1.8–7.3)
NEUTS SEG NFR BLD: 42.2 % (ref 43–80)
PLATELET # BLD AUTO: 158 E9/L (ref 130–450)
PMV BLD AUTO: 12.2 FL (ref 7–12)
POTASSIUM SERPL-SCNC: 3.6 MMOL/L (ref 3.5–5)
PROT SERPL-MCNC: 5.2 G/DL (ref 6.4–8.3)
RBC # BLD AUTO: 3.99 E12/L (ref 3.5–5.5)
SODIUM SERPL-SCNC: 145 MMOL/L (ref 132–146)
WBC # BLD: 6.1 E9/L (ref 4.5–11.5)

## 2023-05-02 PROCEDURE — 6370000000 HC RX 637 (ALT 250 FOR IP): Performed by: INTERNAL MEDICINE

## 2023-05-02 PROCEDURE — 80053 COMPREHEN METABOLIC PANEL: CPT

## 2023-05-02 PROCEDURE — 2580000003 HC RX 258: Performed by: INTERNAL MEDICINE

## 2023-05-02 PROCEDURE — 85025 COMPLETE CBC W/AUTO DIFF WBC: CPT

## 2023-05-02 PROCEDURE — 83735 ASSAY OF MAGNESIUM: CPT

## 2023-05-02 PROCEDURE — 36415 COLL VENOUS BLD VENIPUNCTURE: CPT

## 2023-05-02 RX ADMIN — VENLAFAXINE HYDROCHLORIDE 150 MG: 150 CAPSULE, EXTENDED RELEASE ORAL at 08:14

## 2023-05-02 RX ADMIN — RIVAROXABAN 20 MG: 20 TABLET, FILM COATED ORAL at 08:14

## 2023-05-02 RX ADMIN — SODIUM CHLORIDE, PRESERVATIVE FREE 10 ML: 5 INJECTION INTRAVENOUS at 08:15

## 2023-05-02 RX ADMIN — PRENATAL WITH FERROUS FUM AND FOLIC ACID 1 TABLET: 3080; 920; 120; 400; 22; 1.84; 3; 20; 10; 1; 12; 200; 27; 25; 2 TABLET ORAL at 08:14

## 2023-05-02 NOTE — CARE COORDINATION
5/2/2023 0950 CM note:  Pt resides alone in a mobile home as her  is currently in [facility] [address] Pt's dtr Ny Fuller and pt's 2 step daughters assist her but they are employed so they can not provide 24/7 care. Ny Fuller has been looking into Assisted Living for her mother. [facility] accepted pt and received orders. [employer] information given to Ny Fuller and referral placed. Plan is for pt to return home with [facility], family will provide transportation.  Indy CAMACHO

## 2023-05-02 NOTE — PROGRESS NOTES
Physical Therapy  Physical Therapy    Room #:   5370/5169-92    Date: 2023       Patient Name: Daina Cantu  : 1940      MRN: [id number]     Patient unavailable for physical therapy treatment due to  patient is being discharged to home . Fabiano Lawson  [facility]  LIC # [id number]

## 2023-05-02 NOTE — PLAN OF CARE
Problem: ABCDS Injury Assessment  Goal: Absence of physical injury  Outcome: Adequate for Discharge     Problem: Safety - Adult  Goal: Free from fall injury  Outcome: Adequate for Discharge     Problem: Skin/Tissue Integrity  Goal: Absence of new skin breakdown  Description: 1. Monitor for areas of redness and/or skin breakdown  2. Assess vascular access sites hourly  3. Every 4-6 hours minimum:  Change oxygen saturation probe site  4. Every 4-6 hours:  If on nasal continuous positive airway pressure, respiratory therapy assess nares and determine need for appliance change or resting period.   Outcome: Adequate for Discharge

## 2023-05-03 ENCOUNTER — CARE COORDINATION (OUTPATIENT)
Dept: CASE MANAGEMENT | Age: 83
End: 2023-05-03

## 2023-05-03 NOTE — CARE COORDINATION
[address] Care Transitions Initial Follow Up Call    Call within 2 business days of discharge: Yes    Patient Current Location:  Home: [address] [place] [address]    Care Transition Nurse contacted the family by telephone to perform post hospital discharge assessment. Verified name and  with family as identifiers. Provided introduction to self, and explanation of the Care Transition Nurse role. CTN spoke with patient's daughter/caregiver, Ihsan Pink. Patient: Norberto Saavedra Patient : 1940   MRN: [id number]  Reason for Admission: Hypokalemia  Discharge Date: 23 RARS: Readmission Risk Score: 12.9      Last Discharge  [facility]       Date Complaint Diagnosis Description Type Department Provider    23 Altered Mental Status Hypokalemia . .. ED to Hosp-Admission (Discharged) (ADMITTED) [facility] [address] MD Dagoberto; Thea Mccauley... Was this an external facility discharge? No Discharge Facility: [address] [place] [address] to be reviewed by the provider   Additional needs identified to be addressed with provider: No           Method of communication with provider: none. CTN spoke with patient's daughter/caregiver, Ihsan Pink. Drake Raw is pleasant in conversation and provides brief patient update.   -reports symptoms of confusion are unchanged; reports patient with increased confusion in the evening hours   -reports patient is incontinent of urine; wears adult diapers  -reports unsure of last BM   -reports patient needs encouraged to eat   -utilizes a walker prn   -family provides transportation as needed   -reports patient's medications are pill packed    Drake Raw reports the family is currently in the process of getting the patient into an Assisted Living facility. Emotional support provided. CTN contact information provided for future needs.        Care Transition Nurse reviewed discharge instructions, medical action plan, and red flags with family who

## 2023-05-08 ENCOUNTER — APPOINTMENT (OUTPATIENT)
Dept: CT IMAGING | Age: 83
End: 2023-05-08
Payer: MEDICARE

## 2023-05-08 ENCOUNTER — HOSPITAL ENCOUNTER (OUTPATIENT)
Age: 83
Setting detail: OBSERVATION
Discharge: HOME | End: 2023-05-12
Attending: EMERGENCY MEDICINE | Admitting: INTERNAL MEDICINE
Payer: MEDICARE

## 2023-05-08 ENCOUNTER — APPOINTMENT (OUTPATIENT)
Dept: GENERAL RADIOLOGY | Age: 83
End: 2023-05-08
Payer: MEDICARE

## 2023-05-08 ENCOUNTER — APPOINTMENT (OUTPATIENT)
Dept: MRI IMAGING | Age: 83
End: 2023-05-08
Payer: MEDICARE

## 2023-05-08 DIAGNOSIS — G45.9 TIA (TRANSIENT ISCHEMIC ATTACK): Primary | ICD-10-CM

## 2023-05-08 LAB
ALBUMIN SERPL-MCNC: 3.5 G/DL (ref 3.5–5.2)
ALP SERPL-CCNC: 131 U/L (ref 35–104)
ALT SERPL-CCNC: 38 U/L (ref 0–32)
ANION GAP SERPL CALCULATED.3IONS-SCNC: 9 MMOL/L (ref 7–16)
AST SERPL-CCNC: 45 U/L (ref 0–31)
BASOPHILS # BLD: 0.02 E9/L (ref 0–0.2)
BASOPHILS NFR BLD: 0.3 % (ref 0–2)
BILIRUB SERPL-MCNC: 0.7 MG/DL (ref 0–1.2)
BILIRUB UR QL STRIP: NEGATIVE
BUN SERPL-MCNC: 12 MG/DL (ref 6–23)
CALCIUM SERPL-MCNC: 9 MG/DL (ref 8.6–10.2)
CHLORIDE SERPL-SCNC: 105 MMOL/L (ref 98–107)
CLARITY UR: CLEAR
CO2 SERPL-SCNC: 30 MMOL/L (ref 22–29)
COLOR UR: YELLOW
CREAT SERPL-MCNC: 0.7 MG/DL (ref 0.5–1)
EOSINOPHIL # BLD: 0.02 E9/L (ref 0.05–0.5)
EOSINOPHIL NFR BLD: 0.3 % (ref 0–6)
ERYTHROCYTE [DISTWIDTH] IN BLOOD BY AUTOMATED COUNT: 14.6 FL (ref 11.5–15)
GLUCOSE SERPL-MCNC: 71 MG/DL (ref 74–99)
GLUCOSE UR STRIP-MCNC: NEGATIVE MG/DL
HCT VFR BLD AUTO: 39.9 % (ref 34–48)
HGB BLD-MCNC: 12.9 G/DL (ref 11.5–15.5)
HGB UR QL STRIP: NEGATIVE
IMM GRANULOCYTES # BLD: 0.02 E9/L
IMM GRANULOCYTES NFR BLD: 0.3 % (ref 0–5)
INR BLD: 2.3
KETONES UR STRIP-MCNC: NEGATIVE MG/DL
LACTATE BLDV-SCNC: 1.5 MMOL/L (ref 0.5–2.2)
LEUKOCYTE ESTERASE UR QL STRIP: NEGATIVE
LYMPHOCYTES # BLD: 2.95 E9/L (ref 1.5–4)
LYMPHOCYTES NFR BLD: 42.9 % (ref 20–42)
MCH RBC QN AUTO: 30.8 PG (ref 26–35)
MCHC RBC AUTO-ENTMCNC: 32.3 % (ref 32–34.5)
MCV RBC AUTO: 95.2 FL (ref 80–99.9)
METER GLUCOSE: 127 MG/DL (ref 74–99)
MONOCYTES # BLD: 0.48 E9/L (ref 0.1–0.95)
MONOCYTES NFR BLD: 7 % (ref 2–12)
NEUTROPHILS # BLD: 3.38 E9/L (ref 1.8–7.3)
NEUTS SEG NFR BLD: 49.2 % (ref 43–80)
NITRITE UR QL STRIP: NEGATIVE
PH UR STRIP: 7.5 [PH] (ref 5–9)
PLATELET # BLD AUTO: 200 E9/L (ref 130–450)
PMV BLD AUTO: 11.2 FL (ref 7–12)
POTASSIUM SERPL-SCNC: 4 MMOL/L (ref 3.5–5)
PROT SERPL-MCNC: 6.1 G/DL (ref 6.4–8.3)
PROT UR STRIP-MCNC: NEGATIVE MG/DL
PROTHROMBIN TIME: 27.2 SEC (ref 9.3–12.4)
RBC # BLD AUTO: 4.19 E12/L (ref 3.5–5.5)
SARS-COV-2 RDRP RESP QL NAA+PROBE: NOT DETECTED
SODIUM SERPL-SCNC: 144 MMOL/L (ref 132–146)
SP GR UR STRIP: <=1.005 (ref 1–1.03)
TROPONIN, HIGH SENSITIVITY: 12 NG/L (ref 0–9)
TROPONIN, HIGH SENSITIVITY: 14 NG/L (ref 0–9)
UROBILINOGEN UR STRIP-ACNC: 2 E.U./DL
WBC # BLD: 6.9 E9/L (ref 4.5–11.5)

## 2023-05-08 PROCEDURE — 36415 COLL VENOUS BLD VENIPUNCTURE: CPT

## 2023-05-08 PROCEDURE — 6360000004 HC RX CONTRAST MEDICATION: Performed by: RADIOLOGY

## 2023-05-08 PROCEDURE — APPSS45 APP SPLIT SHARED TIME 31-45 MINUTES: Performed by: NURSE PRACTITIONER

## 2023-05-08 PROCEDURE — 70450 CT HEAD/BRAIN W/O DYE: CPT

## 2023-05-08 PROCEDURE — 85025 COMPLETE CBC W/AUTO DIFF WBC: CPT

## 2023-05-08 PROCEDURE — 82962 GLUCOSE BLOOD TEST: CPT

## 2023-05-08 PROCEDURE — 96361 HYDRATE IV INFUSION ADD-ON: CPT

## 2023-05-08 PROCEDURE — 96374 THER/PROPH/DIAG INJ IV PUSH: CPT

## 2023-05-08 PROCEDURE — 99285 EMERGENCY DEPT VISIT HI MDM: CPT

## 2023-05-08 PROCEDURE — 87635 SARS-COV-2 COVID-19 AMP PRB: CPT

## 2023-05-08 PROCEDURE — G0378 HOSPITAL OBSERVATION PER HR: HCPCS

## 2023-05-08 PROCEDURE — 85610 PROTHROMBIN TIME: CPT

## 2023-05-08 PROCEDURE — 2580000003 HC RX 258: Performed by: STUDENT IN AN ORGANIZED HEALTH CARE EDUCATION/TRAINING PROGRAM

## 2023-05-08 PROCEDURE — 99223 1ST HOSP IP/OBS HIGH 75: CPT | Performed by: INTERNAL MEDICINE

## 2023-05-08 PROCEDURE — 83605 ASSAY OF LACTIC ACID: CPT

## 2023-05-08 PROCEDURE — 70551 MRI BRAIN STEM W/O DYE: CPT

## 2023-05-08 PROCEDURE — 96375 TX/PRO/DX INJ NEW DRUG ADDON: CPT

## 2023-05-08 PROCEDURE — 80053 COMPREHEN METABOLIC PANEL: CPT

## 2023-05-08 PROCEDURE — 70498 CT ANGIOGRAPHY NECK: CPT

## 2023-05-08 PROCEDURE — 70496 CT ANGIOGRAPHY HEAD: CPT

## 2023-05-08 PROCEDURE — 84484 ASSAY OF TROPONIN QUANT: CPT

## 2023-05-08 PROCEDURE — 6360000002 HC RX W HCPCS

## 2023-05-08 PROCEDURE — 71045 X-RAY EXAM CHEST 1 VIEW: CPT

## 2023-05-08 PROCEDURE — 93005 ELECTROCARDIOGRAM TRACING: CPT | Performed by: STUDENT IN AN ORGANIZED HEALTH CARE EDUCATION/TRAINING PROGRAM

## 2023-05-08 PROCEDURE — 81003 URINALYSIS AUTO W/O SCOPE: CPT

## 2023-05-08 PROCEDURE — 2580000003 HC RX 258: Performed by: NURSE PRACTITIONER

## 2023-05-08 PROCEDURE — 6370000000 HC RX 637 (ALT 250 FOR IP): Performed by: NURSE PRACTITIONER

## 2023-05-08 RX ORDER — ACETAMINOPHEN 650 MG/1
650 SUPPOSITORY RECTAL EVERY 6 HOURS PRN
Status: DISCONTINUED | OUTPATIENT
Start: 2023-05-08 | End: 2023-05-12 | Stop reason: HOSPADM

## 2023-05-08 RX ORDER — DIPHENHYDRAMINE HYDROCHLORIDE 50 MG/ML
INJECTION INTRAMUSCULAR; INTRAVENOUS
Status: COMPLETED
Start: 2023-05-08 | End: 2023-05-08

## 2023-05-08 RX ORDER — ACETAMINOPHEN 325 MG/1
650 TABLET ORAL EVERY 6 HOURS PRN
Status: DISCONTINUED | OUTPATIENT
Start: 2023-05-08 | End: 2023-05-12 | Stop reason: HOSPADM

## 2023-05-08 RX ORDER — ONDANSETRON 2 MG/ML
4 INJECTION INTRAMUSCULAR; INTRAVENOUS EVERY 6 HOURS PRN
Status: DISCONTINUED | OUTPATIENT
Start: 2023-05-08 | End: 2023-05-12 | Stop reason: HOSPADM

## 2023-05-08 RX ORDER — DEXTROSE MONOHYDRATE 100 MG/ML
INJECTION, SOLUTION INTRAVENOUS CONTINUOUS PRN
Status: DISCONTINUED | OUTPATIENT
Start: 2023-05-08 | End: 2023-05-12 | Stop reason: HOSPADM

## 2023-05-08 RX ORDER — ASPIRIN 81 MG/1
81 TABLET ORAL DAILY
Status: DISCONTINUED | OUTPATIENT
Start: 2023-05-08 | End: 2023-05-09

## 2023-05-08 RX ORDER — SODIUM CHLORIDE 0.9 % (FLUSH) 0.9 %
5-40 SYRINGE (ML) INJECTION EVERY 12 HOURS SCHEDULED
Status: DISCONTINUED | OUTPATIENT
Start: 2023-05-08 | End: 2023-05-12 | Stop reason: HOSPADM

## 2023-05-08 RX ORDER — METHYLPREDNISOLONE SODIUM SUCCINATE 125 MG/2ML
INJECTION, POWDER, LYOPHILIZED, FOR SOLUTION INTRAMUSCULAR; INTRAVENOUS
Status: COMPLETED
Start: 2023-05-08 | End: 2023-05-08

## 2023-05-08 RX ORDER — DIPHENHYDRAMINE HYDROCHLORIDE 50 MG/ML
25 INJECTION INTRAMUSCULAR; INTRAVENOUS ONCE
Status: COMPLETED | OUTPATIENT
Start: 2023-05-08 | End: 2023-05-08

## 2023-05-08 RX ORDER — METHYLPREDNISOLONE SODIUM SUCCINATE 125 MG/2ML
125 INJECTION, POWDER, LYOPHILIZED, FOR SOLUTION INTRAMUSCULAR; INTRAVENOUS DAILY
Status: DISCONTINUED | OUTPATIENT
Start: 2023-05-08 | End: 2023-05-09

## 2023-05-08 RX ORDER — SODIUM CHLORIDE 9 MG/ML
INJECTION, SOLUTION INTRAVENOUS PRN
Status: DISCONTINUED | OUTPATIENT
Start: 2023-05-08 | End: 2023-05-12 | Stop reason: HOSPADM

## 2023-05-08 RX ORDER — ATORVASTATIN CALCIUM 40 MG/1
40 TABLET, FILM COATED ORAL NIGHTLY
Status: DISCONTINUED | OUTPATIENT
Start: 2023-05-09 | End: 2023-05-12 | Stop reason: HOSPADM

## 2023-05-08 RX ORDER — VENLAFAXINE HYDROCHLORIDE 150 MG/1
150 CAPSULE, EXTENDED RELEASE ORAL DAILY
Status: DISCONTINUED | OUTPATIENT
Start: 2023-05-09 | End: 2023-05-12 | Stop reason: HOSPADM

## 2023-05-08 RX ORDER — SODIUM CHLORIDE 0.9 % (FLUSH) 0.9 %
5-40 SYRINGE (ML) INJECTION PRN
Status: DISCONTINUED | OUTPATIENT
Start: 2023-05-08 | End: 2023-05-12 | Stop reason: HOSPADM

## 2023-05-08 RX ORDER — POLYETHYLENE GLYCOL 3350 17 G/17G
17 POWDER, FOR SOLUTION ORAL DAILY PRN
Status: DISCONTINUED | OUTPATIENT
Start: 2023-05-08 | End: 2023-05-12 | Stop reason: HOSPADM

## 2023-05-08 RX ORDER — ONDANSETRON 4 MG/1
4 TABLET, ORALLY DISINTEGRATING ORAL EVERY 8 HOURS PRN
Status: DISCONTINUED | OUTPATIENT
Start: 2023-05-08 | End: 2023-05-12 | Stop reason: HOSPADM

## 2023-05-08 RX ORDER — DONEPEZIL HYDROCHLORIDE 5 MG/1
5 TABLET, FILM COATED ORAL NIGHTLY
Status: DISCONTINUED | OUTPATIENT
Start: 2023-05-09 | End: 2023-05-12 | Stop reason: HOSPADM

## 2023-05-08 RX ORDER — PRENATAL WITH FERROUS FUM AND FOLIC ACID 3080; 920; 120; 400; 22; 1.84; 3; 20; 10; 1; 12; 200; 27; 25; 2 [IU]/1; [IU]/1; MG/1; [IU]/1; MG/1; MG/1; MG/1; MG/1; MG/1; MG/1; UG/1; MG/1; MG/1; MG/1; MG/1
1 TABLET ORAL DAILY
Status: DISCONTINUED | OUTPATIENT
Start: 2023-05-09 | End: 2023-05-12 | Stop reason: HOSPADM

## 2023-05-08 RX ADMIN — METHYLPREDNISOLONE SODIUM SUCCINATE 125 MG: 125 INJECTION, POWDER, FOR SOLUTION INTRAMUSCULAR; INTRAVENOUS at 10:29

## 2023-05-08 RX ADMIN — DEXTROSE MONOHYDRATE 250 ML: 100 INJECTION, SOLUTION INTRAVENOUS at 11:39

## 2023-05-08 RX ADMIN — SODIUM CHLORIDE, PRESERVATIVE FREE 10 ML: 5 INJECTION INTRAVENOUS at 21:34

## 2023-05-08 RX ADMIN — DIPHENHYDRAMINE HYDROCHLORIDE 25 MG: 50 INJECTION INTRAMUSCULAR; INTRAVENOUS at 10:29

## 2023-05-08 RX ADMIN — ASPIRIN 81 MG: 81 TABLET, COATED ORAL at 16:50

## 2023-05-08 RX ADMIN — IOPAMIDOL 75 ML: 755 INJECTION, SOLUTION INTRAVENOUS at 10:44

## 2023-05-08 RX ADMIN — DIPHENHYDRAMINE HYDROCHLORIDE 25 MG: 50 INJECTION, SOLUTION INTRAMUSCULAR; INTRAVENOUS at 10:29

## 2023-05-08 ASSESSMENT — PAIN SCALES - GENERAL: PAINLEVEL_OUTOF10: 0

## 2023-05-08 NOTE — ED NOTES
PT placed ambulated to bathroom with assistance and provided urine sample at this time. This RN sent urine sample to lab.       Dean Velasquez RN  05/08/23 4005

## 2023-05-08 NOTE — ED PROVIDER NOTES
cerebral infarction ([facility] [address]     Depression     DVT (deep vein thrombosis) in pregnancy     DVT of lower extremity, bilateral (HCC)     GERD (gastroesophageal reflux disease)     Hx of blood clots     Hyperlipidemia     Hypertension     cured    Nephrolithiasis        CONSULTS: (Who and What was discussed)  IP CONSULT TO NEUROLOGY    Discussion with Other Profesionals : Admitting Team Dr. Jag Alainz    Social Determinants : None    Records Reviewed : Inpatient Notes admission 4/30/23    CC/HPI Summary, DDx, ED Course, and Reassessment: Patient is placed on cardiac monitor and continuous pulse ox for monitoring. EKG is ordered to evaluate patient's current cardiac rhythm, and to interpret QT interval prior to administering medications. CBC is ordered to evaluate for any signs of infection or inflammation by obtaining a WBC count, or any signs of acute anemia by interpreting hemoglobin. CMP was ordered to evaluate for any electrolyte imbalances, kidney function, hepatic injury or any elevations in anion gap. Troponin ordered to evaluate for possible cardiac etiology of symptoms including but not limited to STEMI or NSTEMI. Urinalysis ordered to evaluate for a UTI and/or hematuria. Lactic acid level obtained to evaluate for signs of organ hypoperfusion or ischemia. Viral swabs are ordered to evaluate possible viral etiology of symptoms. Protime-INR ordered in case hemorrhages are found on imaging that requires anticoagulation reversal or surgical intervention. CT head without contrast was ordered to evaluate for acute or chronic intracranial hemorrhage or masses. Chest x-ray is ordered to evaluate for any possible signs of, but without limitation to, pneumonia, pleural effusions, cardiomegaly, pneumothorax, atelectasis, rib or sternal abnormalities including fractures. CTA head and neck were also ordered to evaluate for possible stroke. Patient initially given pretreatment for contrast with Benadryl and Solu-Medrol.   CTs did

## 2023-05-08 NOTE — H&P
lower extremities. BP (!) 145/71   Pulse 86   Temp 97.5 °F (36.4 °C) (Oral)   Resp 18   SpO2 99%     Recent Labs     05/08/23  1025      K 4.0      CO2 30*   BUN 12   CREATININE 0.7   GLUCOSE 71*   CALCIUM 9.0       Recent Labs     05/08/23  1025   WBC 6.9   RBC 4.19   HGB 12.9   HCT 39.9   MCV 95.2   MCH 30.8   MCHC 32.3   RDW 14.6      MPV 11.2         Radiology:   XR CHEST 1 VIEW   Final Result   No acute process. CTA HEAD W CONTRAST   Final Result   1. No large vessel occlusion, significant stenosis or cerebral aneurysm   identified within the brain. 2. No significant arterial stenosis of the common or internal carotid   arteries identified. 3. Mild stenosis of the right subclavian artery. CTA NECK W CONTRAST   Final Result   1. No large vessel occlusion, significant stenosis or cerebral aneurysm   identified within the brain. 2. No significant arterial stenosis of the common or internal carotid   arteries identified. 3. Mild stenosis of the right subclavian artery. CT Head W/O Contrast   Final Result   1. There is no acute intracranial abnormality. Specifically, there is no   intracranial hemorrhage. 2. Significant atrophy and periventricular leukomalacia,   .              EKG preliminary Narrative & Impression    Normal sinus rhythm  Normal ECG  When compared with ECG of 01-MAY-2023 02:21,  No significant change was found       Assessment & Plan:    TIA  Hx of CVA  -last known well 5/7 at approx 1700  -No acute abnormalities noted on initial imaging  -MRI brain  -neuro checks q4h x24h  -consult neurology  -bedside swallow study, reg diet if passes  -continue home statin   -start asa 81mg daily    Hypoglycemia   -random glucose 71 in ER  -hypoglycemic episodes during recent admission  -AC, HS, & prn BS checks  -hypoglycemia protocol    Weakness  -up with assistance  -PT/OT consulted  -SW consulted for possible placement  -nutrition supplementation

## 2023-05-08 NOTE — CARE COORDINATION
SS Note: Sw received call from pt's Dtr- Rebecca Speaker. She reports pt's choices for SNF are 1) [place] [facility] [id number]. 2) [facility] and 3) [facility] [place]. SW completed referral to [facility] @ [place] [address]. via .    Electronically signed by CHARLES Dubon on 5/8/2023 at 4:58 PM

## 2023-05-08 NOTE — ACP (ADVANCE CARE PLANNING)
Advance Care Planning   Healthcare Decision Maker:    Primary Decision Maker: Martina Long - Child - [phone]    Secondary Decision Maker: Aliza Barraza [facility]-POA - Child - [phone]    Click here to complete Healthcare Decision Makers including selection of the Healthcare Decision Maker Relationship (ie \"Primary\"). Today we documented Decision Maker(s) consistent with ACP documents on file. SW assisted pt to complete POP today.   Electronically signed by CHARLES Bell on 5/8/2023 at 1:09 PM

## 2023-05-08 NOTE — CARE COORDINATION
Case Management Assessment  Initial Evaluation    Date/Time of Evaluation: 5/8/2023 1:41 PM  Assessment Completed by: CHARLES George    If patient is discharged prior to next notation, then this note serves as note for discharge by case management. Patient Name: Watson Redman                   YOB: 1940  Diagnosis: TIA (transient ischemic attack) [G45.9]                   Date / Time: 5/8/2023 10:21 AM    Patient Admission Status: Observation   Readmission Risk (Low < 19, Mod (19-27), High > 27): Readmission Risk Score: 12.9    Current PCP: Denny Encarnacion MD  PCP verified by CM?  Yes    Chart Reviewed: Yes      History Provided by: Patient, Child/Family  Patient Orientation: Alert and Oriented, Person, Place, Self, Other (see comment) (Does have some memory issues.)    Patient Cognition: Alert    Hospitalization in the last 30 days (Readmission):  Yes    Readmission Assessment  Number of Days since last admission?: 1-7 days  Previous Disposition: Home with Home Health  Who is being Interviewed: Patient, Caregiver (Dtr- [id number] Speaker)  What was the patient's/caregiver's perception as to why they think they needed to return back to the hospital?: Not enough help at home, Other (Comment) (Pt had TIA)  Did you visit your Primary Care Physician after you left the hospital, before you returned this time?: No  Why weren't you able to visit your PCP?: Other (Comment) (Apt scheduled today)  Did you see a specialist, such as Cardiac, Pulmonary, Orthopedic Physician, etc. after you left the hospital?: No  Who advised the patient to return to the hospital?: Other (Comment) (Dtr- [id number] Speaker)  Does the patient report anything that got in the way of taking their medications?: No  In our efforts to provide the best possible care to you and others like you, can you think of anything that we could have done to help you after you left the hospital the first time, so that you might not have needed to

## 2023-05-08 NOTE — ED NOTES
Pt transported to CT and returned to back to room at this time. Medicated with solu medrol and benadryl prior to being transported to CT.      Jass Van RN  05/08/23 9944

## 2023-05-09 ENCOUNTER — APPOINTMENT (OUTPATIENT)
Dept: NEUROLOGY | Age: 83
End: 2023-05-09
Payer: MEDICARE

## 2023-05-09 PROBLEM — E44.0 MODERATE PROTEIN-CALORIE MALNUTRITION (HCC): Chronic | Status: ACTIVE | Noted: 2023-05-09

## 2023-05-09 PROBLEM — R55 SYNCOPE: Status: ACTIVE | Noted: 2023-05-09

## 2023-05-09 LAB
ANION GAP SERPL CALCULATED.3IONS-SCNC: 11 MMOL/L (ref 7–16)
BASOPHILS # BLD: 0.01 E9/L (ref 0–0.2)
BASOPHILS NFR BLD: 0.1 % (ref 0–2)
BUN SERPL-MCNC: 13 MG/DL (ref 6–23)
CALCIUM SERPL-MCNC: 9.1 MG/DL (ref 8.6–10.2)
CHLORIDE SERPL-SCNC: 107 MMOL/L (ref 98–107)
CO2 SERPL-SCNC: 26 MMOL/L (ref 22–29)
CREAT SERPL-MCNC: 0.6 MG/DL (ref 0.5–1)
EKG ATRIAL RATE: 65 BPM
EKG P AXIS: 79 DEGREES
EKG P-R INTERVAL: 170 MS
EKG Q-T INTERVAL: 428 MS
EKG QRS DURATION: 70 MS
EKG QTC CALCULATION (BAZETT): 445 MS
EKG R AXIS: 78 DEGREES
EKG T AXIS: 75 DEGREES
EKG VENTRICULAR RATE: 65 BPM
EOSINOPHIL # BLD: 0 E9/L (ref 0.05–0.5)
EOSINOPHIL NFR BLD: 0 % (ref 0–6)
ERYTHROCYTE [DISTWIDTH] IN BLOOD BY AUTOMATED COUNT: 14.3 FL (ref 11.5–15)
GLUCOSE SERPL-MCNC: 90 MG/DL (ref 74–99)
HCT VFR BLD AUTO: 38 % (ref 34–48)
HGB BLD-MCNC: 12.5 G/DL (ref 11.5–15.5)
IMM GRANULOCYTES # BLD: 0.05 E9/L
IMM GRANULOCYTES NFR BLD: 0.4 % (ref 0–5)
LYMPHOCYTES # BLD: 1.62 E9/L (ref 1.5–4)
LYMPHOCYTES NFR BLD: 14.3 % (ref 20–42)
MAGNESIUM SERPL-MCNC: 1.8 MG/DL (ref 1.6–2.6)
MCH RBC QN AUTO: 30.6 PG (ref 26–35)
MCHC RBC AUTO-ENTMCNC: 32.9 % (ref 32–34.5)
MCV RBC AUTO: 93.1 FL (ref 80–99.9)
METER GLUCOSE: 108 MG/DL (ref 74–99)
METER GLUCOSE: 167 MG/DL (ref 74–99)
METER GLUCOSE: 88 MG/DL (ref 74–99)
MONOCYTES # BLD: 0.74 E9/L (ref 0.1–0.95)
MONOCYTES NFR BLD: 6.5 % (ref 2–12)
NEUTROPHILS # BLD: 8.91 E9/L (ref 1.8–7.3)
NEUTS SEG NFR BLD: 78.7 % (ref 43–80)
PLATELET # BLD AUTO: 199 E9/L (ref 130–450)
PMV BLD AUTO: 11.3 FL (ref 7–12)
POTASSIUM SERPL-SCNC: 3.3 MMOL/L (ref 3.5–5)
RBC # BLD AUTO: 4.08 E12/L (ref 3.5–5.5)
SODIUM SERPL-SCNC: 144 MMOL/L (ref 132–146)
WBC # BLD: 11.3 E9/L (ref 4.5–11.5)

## 2023-05-09 PROCEDURE — 99232 SBSQ HOSP IP/OBS MODERATE 35: CPT | Performed by: INTERNAL MEDICINE

## 2023-05-09 PROCEDURE — 97535 SELF CARE MNGMENT TRAINING: CPT

## 2023-05-09 PROCEDURE — 97165 OT EVAL LOW COMPLEX 30 MIN: CPT

## 2023-05-09 PROCEDURE — 80048 BASIC METABOLIC PNL TOTAL CA: CPT

## 2023-05-09 PROCEDURE — G0378 HOSPITAL OBSERVATION PER HR: HCPCS

## 2023-05-09 PROCEDURE — 97161 PT EVAL LOW COMPLEX 20 MIN: CPT | Performed by: PHYSICAL THERAPIST

## 2023-05-09 PROCEDURE — 6360000002 HC RX W HCPCS: Performed by: STUDENT IN AN ORGANIZED HEALTH CARE EDUCATION/TRAINING PROGRAM

## 2023-05-09 PROCEDURE — 83735 ASSAY OF MAGNESIUM: CPT

## 2023-05-09 PROCEDURE — 82962 GLUCOSE BLOOD TEST: CPT

## 2023-05-09 PROCEDURE — 97530 THERAPEUTIC ACTIVITIES: CPT | Performed by: PHYSICAL THERAPIST

## 2023-05-09 PROCEDURE — 95819 EEG AWAKE AND ASLEEP: CPT

## 2023-05-09 PROCEDURE — APPSS30 APP SPLIT SHARED TIME 16-30 MINUTES: Performed by: NURSE PRACTITIONER

## 2023-05-09 PROCEDURE — 97530 THERAPEUTIC ACTIVITIES: CPT

## 2023-05-09 PROCEDURE — 6370000000 HC RX 637 (ALT 250 FOR IP): Performed by: NURSE PRACTITIONER

## 2023-05-09 PROCEDURE — 36415 COLL VENOUS BLD VENIPUNCTURE: CPT

## 2023-05-09 PROCEDURE — 85025 COMPLETE CBC W/AUTO DIFF WBC: CPT

## 2023-05-09 PROCEDURE — 2580000003 HC RX 258: Performed by: NURSE PRACTITIONER

## 2023-05-09 PROCEDURE — 93010 ELECTROCARDIOGRAM REPORT: CPT | Performed by: INTERNAL MEDICINE

## 2023-05-09 RX ORDER — POTASSIUM CHLORIDE 750 MG/1
40 TABLET, EXTENDED RELEASE ORAL ONCE
Status: COMPLETED | OUTPATIENT
Start: 2023-05-09 | End: 2023-05-09

## 2023-05-09 RX ADMIN — VENLAFAXINE HYDROCHLORIDE 150 MG: 150 CAPSULE, EXTENDED RELEASE ORAL at 08:15

## 2023-05-09 RX ADMIN — SODIUM CHLORIDE, PRESERVATIVE FREE 10 ML: 5 INJECTION INTRAVENOUS at 08:16

## 2023-05-09 RX ADMIN — RIVAROXABAN 20 MG: 20 TABLET, FILM COATED ORAL at 08:15

## 2023-05-09 RX ADMIN — SODIUM CHLORIDE, PRESERVATIVE FREE 10 ML: 5 INJECTION INTRAVENOUS at 20:19

## 2023-05-09 RX ADMIN — POTASSIUM CHLORIDE 40 MEQ: 750 TABLET, EXTENDED RELEASE ORAL at 11:41

## 2023-05-09 RX ADMIN — ASPIRIN 81 MG: 81 TABLET, COATED ORAL at 08:15

## 2023-05-09 RX ADMIN — ATORVASTATIN CALCIUM 40 MG: 40 TABLET, FILM COATED ORAL at 20:19

## 2023-05-09 RX ADMIN — DONEPEZIL HYDROCHLORIDE 5 MG: 5 TABLET, FILM COATED ORAL at 20:19

## 2023-05-09 RX ADMIN — METHYLPREDNISOLONE SODIUM SUCCINATE 125 MG: 125 INJECTION, POWDER, FOR SOLUTION INTRAMUSCULAR; INTRAVENOUS at 08:15

## 2023-05-09 RX ADMIN — PRENATAL WITH FERROUS FUM AND FOLIC ACID 1 TABLET: 3080; 920; 120; 400; 22; 1.84; 3; 20; 10; 1; 12; 200; 27; 25; 2 TABLET ORAL at 08:16
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
## 2023-05-09 NOTE — PLAN OF CARE
Problem: Skin/Tissue Integrity  Goal: Absence of new skin breakdown  Description: 1. Monitor for areas of redness and/or skin breakdown  2. Assess vascular access sites hourly  3. Every 4-6 hours minimum:  Change oxygen saturation probe site  4. Every 4-6 hours:  If on nasal continuous positive airway pressure, respiratory therapy assess nares and determine need for appliance change or resting period.   5/9/2023 0502 by Rui Jackson RN  Outcome: Progressing     Problem: ABCDS Injury Assessment  Goal: Absence of physical injury  5/9/2023 0502 by Rui Jackson RN  Outcome: Progressing     Problem: Safety - Adult  Goal: Free from fall injury  5/9/2023 0502 by Rui Jackson RN  Outcome: Progressing

## 2023-05-09 NOTE — DISCHARGE INSTR - COC
Continuity of Care Form    Patient Name: Eric Garduno   :  1940  MRN:  [id number]    Admit date:  2023  Discharge date:  ***    Code Status Order: Full Code   Advance Directives:     Admitting Physician:  Tristan Calles MD  PCP: Elham Juan MD    Discharging Nurse: [facility] Unit/Room#: 3710/5599-84  Discharging Unit Phone Number: ***    Emergency Contact:   Extended Emergency Contact Information  Primary Emergency Contact: [facility]  Address: [address] [place] [address] Phone: [phone]  Relation: Spouse   needed?  No  Secondary Emergency Contact: Speaker,Rebecca PEÑANATIVIDAD   Yas[place] [address] Phone: [phone]  Relation: Child    Past Surgical History:  Past Surgical History:   Procedure Laterality Date    ABDOMEN SURGERY  ,     tummy tuck x2    BLADDER SUSPENSION      CHOLECYSTECTOMY, OPEN  3/21/2013    open cholecystectomy with intropertive cholangiogram    COLONOSCOPY      ENDOSCOPY, COLON, DIAGNOSTIC      FOOT SURGERY      GASTRIC BYPASS SURGERY      [address]    HYSTERECTOMY (CERVIX STATUS UNKNOWN)  42 years ago    UT ARTHRP KNE CONDYLE&PLATU MEDIAL&LAT COMPARTMENTS N/A 2018    TOTAL RIGHT KNEE REPLACEMENT ARTHROPLASTY (JOLYNN) performed by Cheryle Hanly, DO at [facility] OR       Immunization History:   Immunization History   Administered Date(s) Administered    COVID-19, PFIZER Bivalent BOOSTER, DO NOT Dilute, (age 12y+), IM, 30 mcg/0.3 mL 10/25/2022    COVID-19, PFIZER GRAY top, DO NOT Dilute, (age 15 y+), IM, 30 mcg/0.3 mL 2022    COVID-19, PFIZER PURPLE top, DILUTE for use, (age 15 y+), 30mcg/0.3mL 2021, 2021    Influenza A ([id number]) Vaccine PF IM 2009    Influenza Virus Vaccine 10/02/2013, 10/05/2015    Influenza, FLUAD, (age 72 y+), Adjuvanted, 0.5mL 10/24/2022    Influenza, High Dose (Fluzone 65 yrs and older)

## 2023-05-09 NOTE — PLAN OF CARE
Problem: Discharge Planning  Goal: Discharge to home or other facility with appropriate resources  Outcome: Progressing     Problem: Skin/Tissue Integrity  Goal: Absence of new skin breakdown  Description: 1. Monitor for areas of redness and/or skin breakdown  2. Assess vascular access sites hourly  3. Every 4-6 hours minimum:  Change oxygen saturation probe site  4. Every 4-6 hours:  If on nasal continuous positive airway pressure, respiratory therapy assess nares and determine need for appliance change or resting period.   5/9/2023 4384 by Percy Carrero RN  Outcome: Progressing     Problem: ABCDS Injury Assessment  Goal: Absence of physical injury  5/9/2023 5613 by Percy Carrero RN  Outcome: Progressing     Problem: Safety - Adult  Goal: Free from fall injury  5/9/2023 0821 by Percy Carrero RN  Outcome: Progressing     Problem: Chronic Conditions and Co-morbidities  Goal: Patient's chronic conditions and co-morbidity symptoms are monitored and maintained or improved  Outcome: Progressing

## 2023-05-09 NOTE — CARE COORDINATION
Therapy sai noted. Spoke with Raffaele Pacheco at [facility] and she states they have accepted. They will start precert. Await auth. Once auth obtained, will need Rapid covid, HENS (initiated) and DEREJE signed. ISA called and spoke with daughter, Kieran Horowitz. Informed her of above and explained if for some reason insurance would not approve LINDSEY, Family would need to make arrangements for someone to be with patient at home 24/7 for [facility] to resume their services per liaison there. Kieran Horowitz states they do not have that ability due to children working. She states they do not have the money for private pay at home or LINDSEY. They have reached out to [facility] and have an assessment scheduled but not for another 10 days. Will wait for insurance auth. At this time.

## 2023-05-10 LAB
ANION GAP SERPL CALCULATED.3IONS-SCNC: 7 MMOL/L (ref 7–16)
BASOPHILS # BLD: 0.02 E9/L (ref 0–0.2)
BASOPHILS NFR BLD: 0.1 % (ref 0–2)
BUN SERPL-MCNC: 18 MG/DL (ref 6–23)
CALCIUM SERPL-MCNC: 9.1 MG/DL (ref 8.6–10.2)
CHLORIDE SERPL-SCNC: 106 MMOL/L (ref 98–107)
CO2 SERPL-SCNC: 30 MMOL/L (ref 22–29)
CREAT SERPL-MCNC: 0.6 MG/DL (ref 0.5–1)
EOSINOPHIL # BLD: 0 E9/L (ref 0.05–0.5)
EOSINOPHIL NFR BLD: 0 % (ref 0–6)
ERYTHROCYTE [DISTWIDTH] IN BLOOD BY AUTOMATED COUNT: 14.6 FL (ref 11.5–15)
GLUCOSE SERPL-MCNC: 87 MG/DL (ref 74–99)
HCT VFR BLD AUTO: 39.1 % (ref 34–48)
HGB BLD-MCNC: 12.5 G/DL (ref 11.5–15.5)
IMM GRANULOCYTES # BLD: 0.08 E9/L
IMM GRANULOCYTES NFR BLD: 0.5 % (ref 0–5)
LYMPHOCYTES # BLD: 2.86 E9/L (ref 1.5–4)
LYMPHOCYTES NFR BLD: 18.9 % (ref 20–42)
MCH RBC QN AUTO: 30.4 PG (ref 26–35)
MCHC RBC AUTO-ENTMCNC: 32 % (ref 32–34.5)
MCV RBC AUTO: 95.1 FL (ref 80–99.9)
METER GLUCOSE: 104 MG/DL (ref 74–99)
METER GLUCOSE: 78 MG/DL (ref 74–99)
METER GLUCOSE: 82 MG/DL (ref 74–99)
METER GLUCOSE: 90 MG/DL (ref 74–99)
MONOCYTES # BLD: 0.99 E9/L (ref 0.1–0.95)
MONOCYTES NFR BLD: 6.5 % (ref 2–12)
NEUTROPHILS # BLD: 11.22 E9/L (ref 1.8–7.3)
NEUTS SEG NFR BLD: 74 % (ref 43–80)
PLATELET # BLD AUTO: 203 E9/L (ref 130–450)
PMV BLD AUTO: 10.9 FL (ref 7–12)
POTASSIUM SERPL-SCNC: 4.1 MMOL/L (ref 3.5–5)
RBC # BLD AUTO: 4.11 E12/L (ref 3.5–5.5)
SODIUM SERPL-SCNC: 143 MMOL/L (ref 132–146)
WBC # BLD: 15.2 E9/L (ref 4.5–11.5)

## 2023-05-10 PROCEDURE — 2580000003 HC RX 258: Performed by: NURSE PRACTITIONER

## 2023-05-10 PROCEDURE — 85025 COMPLETE CBC W/AUTO DIFF WBC: CPT

## 2023-05-10 PROCEDURE — 6370000000 HC RX 637 (ALT 250 FOR IP): Performed by: NURSE PRACTITIONER

## 2023-05-10 PROCEDURE — 80048 BASIC METABOLIC PNL TOTAL CA: CPT

## 2023-05-10 PROCEDURE — 99232 SBSQ HOSP IP/OBS MODERATE 35: CPT | Performed by: INTERNAL MEDICINE

## 2023-05-10 PROCEDURE — 36415 COLL VENOUS BLD VENIPUNCTURE: CPT

## 2023-05-10 PROCEDURE — 82962 GLUCOSE BLOOD TEST: CPT

## 2023-05-10 PROCEDURE — G0378 HOSPITAL OBSERVATION PER HR: HCPCS

## 2023-05-10 PROCEDURE — APPSS30 APP SPLIT SHARED TIME 16-30 MINUTES: Performed by: NURSE PRACTITIONER

## 2023-05-10 RX ADMIN — DONEPEZIL HYDROCHLORIDE 5 MG: 5 TABLET, FILM COATED ORAL at 21:01

## 2023-05-10 RX ADMIN — ATORVASTATIN CALCIUM 40 MG: 40 TABLET, FILM COATED ORAL at 21:01

## 2023-05-10 RX ADMIN — RIVAROXABAN 20 MG: 20 TABLET, FILM COATED ORAL at 09:20

## 2023-05-10 RX ADMIN — SODIUM CHLORIDE, PRESERVATIVE FREE 10 ML: 5 INJECTION INTRAVENOUS at 21:02

## 2023-05-10 RX ADMIN — SODIUM CHLORIDE, PRESERVATIVE FREE 10 ML: 5 INJECTION INTRAVENOUS at 09:20

## 2023-05-10 RX ADMIN — PRENATAL WITH FERROUS FUM AND FOLIC ACID 1 TABLET: 3080; 920; 120; 400; 22; 1.84; 3; 20; 10; 1; 12; 200; 27; 25; 2 TABLET ORAL at 09:20

## 2023-05-10 RX ADMIN — VENLAFAXINE HYDROCHLORIDE 150 MG: 150 CAPSULE, EXTENDED RELEASE ORAL at 09:21

## 2023-05-10 ASSESSMENT — PAIN SCALES - GENERAL: PAINLEVEL_OUTOF10: 0

## 2023-05-10 NOTE — PROCEDURES
[address]                          ELECTROENCEPHALOGRAM REPORT    PATIENT NAME: Paolo Richardson                 :        1940  MED REC NO:   [id number]                            ROOM:       87  ACCOUNT NO:   [de-identified]                           ADMIT DATE: 2023  PROVIDER:     Xiomara Lang MD    DATE OF EE2023    PLACE OF SERVICE:  [facility]. EEG DIAGNOSIS:  Awake normal, sleep normal.    REPORT:  A 19-channel EEG was recorded and demonstrates a background  alpha rhythm of 7-8 Hz following eye closure. Hyperventilation and  photic stimulation were not performed. During the sleep portion of the  record, there was no activation. INTERPRETATION:  Normal awake and sleep EEG. A normal EEG does not rule  out seizure disorder. Clinical correlation advised.         Roz Borges MD    D: 05/10/2023 6:33:16       T: 05/10/2023 6:35:34     [email or web address]  Job#: [id number]     Doc#: [id number]    CC:

## 2023-05-10 NOTE — PLAN OF CARE
Problem: Discharge Planning  Goal: Discharge to home or other facility with appropriate resources  Outcome: Progressing     Problem: Skin/Tissue Integrity  Goal: Absence of new skin breakdown  Description: 1. Monitor for areas of redness and/or skin breakdown  2. Assess vascular access sites hourly  3. Every 4-6 hours minimum:  Change oxygen saturation probe site  4. Every 4-6 hours:  If on nasal continuous positive airway pressure, respiratory therapy assess nares and determine need for appliance change or resting period.   Outcome: Progressing     Problem: ABCDS Injury Assessment  Goal: Absence of physical injury  Outcome: Progressing     Problem: Safety - Adult  Goal: Free from fall injury  Outcome: Progressing     Problem: Chronic Conditions and Co-morbidities  Goal: Patient's chronic conditions and co-morbidity symptoms are monitored and maintained or improved  Outcome: Progressing     Problem: Nutrition Deficit:  Goal: Optimize nutritional status  5/10/2023 0322 by Javier Wilcox RN  Outcome: Progressing  5/9/2023 1558 by Mariusz Carlos RD  Outcome: Progressing

## 2023-05-10 NOTE — CARE COORDINATION
Plan to [address] [place] at [facility], [address] [facility], they initiated Precert yesterday. Await auth. Once auth obtained, will need Rapid covid, HENS (initiated) and DEREJE signed. ISA called and spoke with daughter, Gilberto Vidal. Informed her of above and explained if for some reason insurance would not approve LINDSEY, Family would need to make arrangements for someone to be with patient at home 24/7 for [facility] to resume their services per liaison there. Gilberto Vidal states they do not have that ability due to children working. She states they do not have the money for private pay at home or LINDSEY. They have reached out to [facility] and have an assessment scheduled but not for another 10 days. Will wait for insurance auth. At this time.

## 2023-05-11 LAB
ANION GAP SERPL CALCULATED.3IONS-SCNC: 9 MMOL/L (ref 7–16)
BASOPHILS # BLD: 0.02 E9/L (ref 0–0.2)
BASOPHILS NFR BLD: 0.2 % (ref 0–2)
BUN SERPL-MCNC: 14 MG/DL (ref 6–23)
CALCIUM SERPL-MCNC: 8.5 MG/DL (ref 8.6–10.2)
CHLORIDE SERPL-SCNC: 106 MMOL/L (ref 98–107)
CO2 SERPL-SCNC: 28 MMOL/L (ref 22–29)
CREAT SERPL-MCNC: 0.6 MG/DL (ref 0.5–1)
EOSINOPHIL # BLD: 0.04 E9/L (ref 0.05–0.5)
EOSINOPHIL NFR BLD: 0.4 % (ref 0–6)
ERYTHROCYTE [DISTWIDTH] IN BLOOD BY AUTOMATED COUNT: 14.9 FL (ref 11.5–15)
GLUCOSE SERPL-MCNC: 79 MG/DL (ref 74–99)
HCT VFR BLD AUTO: 40.9 % (ref 34–48)
HGB BLD-MCNC: 12.7 G/DL (ref 11.5–15.5)
IMM GRANULOCYTES # BLD: 0.04 E9/L
IMM GRANULOCYTES NFR BLD: 0.4 % (ref 0–5)
LYMPHOCYTES # BLD: 3.36 E9/L (ref 1.5–4)
LYMPHOCYTES NFR BLD: 35.7 % (ref 20–42)
MCH RBC QN AUTO: 30.8 PG (ref 26–35)
MCHC RBC AUTO-ENTMCNC: 31.1 % (ref 32–34.5)
MCV RBC AUTO: 99.3 FL (ref 80–99.9)
METER GLUCOSE: 71 MG/DL (ref 74–99)
METER GLUCOSE: 72 MG/DL (ref 74–99)
METER GLUCOSE: 73 MG/DL (ref 74–99)
MONOCYTES # BLD: 0.62 E9/L (ref 0.1–0.95)
MONOCYTES NFR BLD: 6.6 % (ref 2–12)
NEUTROPHILS # BLD: 5.32 E9/L (ref 1.8–7.3)
NEUTS SEG NFR BLD: 56.7 % (ref 43–80)
PLATELET # BLD AUTO: 199 E9/L (ref 130–450)
PMV BLD AUTO: 10.8 FL (ref 7–12)
POTASSIUM SERPL-SCNC: 3.8 MMOL/L (ref 3.5–5)
RBC # BLD AUTO: 4.12 E12/L (ref 3.5–5.5)
SODIUM SERPL-SCNC: 143 MMOL/L (ref 132–146)
WBC # BLD: 9.4 E9/L (ref 4.5–11.5)

## 2023-05-11 PROCEDURE — G0378 HOSPITAL OBSERVATION PER HR: HCPCS

## 2023-05-11 PROCEDURE — APPSS30 APP SPLIT SHARED TIME 16-30 MINUTES: Performed by: NURSE PRACTITIONER

## 2023-05-11 PROCEDURE — 82962 GLUCOSE BLOOD TEST: CPT

## 2023-05-11 PROCEDURE — 6370000000 HC RX 637 (ALT 250 FOR IP): Performed by: NURSE PRACTITIONER

## 2023-05-11 PROCEDURE — 2580000003 HC RX 258: Performed by: NURSE PRACTITIONER

## 2023-05-11 PROCEDURE — 36415 COLL VENOUS BLD VENIPUNCTURE: CPT

## 2023-05-11 PROCEDURE — 99239 HOSP IP/OBS DSCHRG MGMT >30: CPT | Performed by: INTERNAL MEDICINE

## 2023-05-11 PROCEDURE — 80048 BASIC METABOLIC PNL TOTAL CA: CPT

## 2023-05-11 PROCEDURE — 97530 THERAPEUTIC ACTIVITIES: CPT | Performed by: PHYSICAL THERAPIST

## 2023-05-11 PROCEDURE — 85025 COMPLETE CBC W/AUTO DIFF WBC: CPT

## 2023-05-11 RX ADMIN — SODIUM CHLORIDE, PRESERVATIVE FREE 10 ML: 5 INJECTION INTRAVENOUS at 20:06

## 2023-05-11 RX ADMIN — SODIUM CHLORIDE, PRESERVATIVE FREE 10 ML: 5 INJECTION INTRAVENOUS at 08:38

## 2023-05-11 RX ADMIN — RIVAROXABAN 20 MG: 20 TABLET, FILM COATED ORAL at 08:37

## 2023-05-11 RX ADMIN — PRENATAL WITH FERROUS FUM AND FOLIC ACID 1 TABLET: 3080; 920; 120; 400; 22; 1.84; 3; 20; 10; 1; 12; 200; 27; 25; 2 TABLET ORAL at 08:37

## 2023-05-11 RX ADMIN — VENLAFAXINE HYDROCHLORIDE 150 MG: 150 CAPSULE, EXTENDED RELEASE ORAL at 08:37

## 2023-05-11 RX ADMIN — DONEPEZIL HYDROCHLORIDE 5 MG: 5 TABLET, FILM COATED ORAL at 20:06

## 2023-05-11 RX ADMIN — ATORVASTATIN CALCIUM 40 MG: 40 TABLET, FILM COATED ORAL at 20:06

## 2023-05-11 NOTE — CARE COORDINATION
Received message from [facility] intends to deny patient rehab. They would like a Peer to Peer must be completed by 12 noon TODAY by calling [phone] opt 4 Ref # [id number]. Notified Jonah Hood NP of above. Spoke with Jonah Hood NP. She completed Peer to Peer with insurance and they stated based on PT notes pt already surpasses criteria for discharge from [facility] 12. Therefore they would recommend long term care at nursing home or 24/7 care at home. Notified Karel Bose at [facility] of above, she states once official Denial is received she will let SW know. Call placed to daughter, Chalo Sommer. Notified her of above and she states they do not have funds to private pay at nursing home so they will plan to take patient home at [place]. She states they did get [facility] appointment moved up to Monday, and in the meantime will just have to try to do the best they can with patient. Offered private duty list for home, and she states they do have a list already. Patient was active with [facility], will NEED RESUME CARE ORDER on DC. Notified Eugenio Bell at [facility] of above.

## 2023-05-12 VITALS
HEART RATE: 68 BPM | SYSTOLIC BLOOD PRESSURE: 120 MMHG | WEIGHT: 136 LBS | DIASTOLIC BLOOD PRESSURE: 64 MMHG | TEMPERATURE: 98 F | HEIGHT: 60 IN | RESPIRATION RATE: 16 BRPM | OXYGEN SATURATION: 96 % | BODY MASS INDEX: 26.7 KG/M2

## 2023-05-12 LAB
ANION GAP SERPL CALCULATED.3IONS-SCNC: 10 MMOL/L (ref 7–16)
BASOPHILS # BLD: 0.02 E9/L (ref 0–0.2)
BASOPHILS NFR BLD: 0.3 % (ref 0–2)
BUN SERPL-MCNC: 16 MG/DL (ref 6–23)
CALCIUM SERPL-MCNC: 8.5 MG/DL (ref 8.6–10.2)
CHLORIDE SERPL-SCNC: 106 MMOL/L (ref 98–107)
CO2 SERPL-SCNC: 29 MMOL/L (ref 22–29)
CREAT SERPL-MCNC: 0.6 MG/DL (ref 0.5–1)
EOSINOPHIL # BLD: 0.06 E9/L (ref 0.05–0.5)
EOSINOPHIL NFR BLD: 0.8 % (ref 0–6)
ERYTHROCYTE [DISTWIDTH] IN BLOOD BY AUTOMATED COUNT: 14.7 FL (ref 11.5–15)
GLUCOSE SERPL-MCNC: 65 MG/DL (ref 74–99)
HCT VFR BLD AUTO: 41.5 % (ref 34–48)
HGB BLD-MCNC: 12.9 G/DL (ref 11.5–15.5)
IMM GRANULOCYTES # BLD: 0.02 E9/L
IMM GRANULOCYTES NFR BLD: 0.3 % (ref 0–5)
LYMPHOCYTES # BLD: 3.99 E9/L (ref 1.5–4)
LYMPHOCYTES NFR BLD: 49.9 % (ref 20–42)
MCH RBC QN AUTO: 30.2 PG (ref 26–35)
MCHC RBC AUTO-ENTMCNC: 31.1 % (ref 32–34.5)
MCV RBC AUTO: 97.2 FL (ref 80–99.9)
METER GLUCOSE: 119 MG/DL (ref 74–99)
MONOCYTES # BLD: 0.58 E9/L (ref 0.1–0.95)
MONOCYTES NFR BLD: 7.3 % (ref 2–12)
NEUTROPHILS # BLD: 3.32 E9/L (ref 1.8–7.3)
NEUTS SEG NFR BLD: 41.4 % (ref 43–80)
PLATELET # BLD AUTO: 196 E9/L (ref 130–450)
PMV BLD AUTO: 11.4 FL (ref 7–12)
POTASSIUM SERPL-SCNC: 4 MMOL/L (ref 3.5–5)
RBC # BLD AUTO: 4.27 E12/L (ref 3.5–5.5)
SODIUM SERPL-SCNC: 145 MMOL/L (ref 132–146)
WBC # BLD: 8 E9/L (ref 4.5–11.5)

## 2023-05-12 PROCEDURE — G0378 HOSPITAL OBSERVATION PER HR: HCPCS

## 2023-05-12 PROCEDURE — 97535 SELF CARE MNGMENT TRAINING: CPT

## 2023-05-12 PROCEDURE — 36415 COLL VENOUS BLD VENIPUNCTURE: CPT

## 2023-05-12 PROCEDURE — 80048 BASIC METABOLIC PNL TOTAL CA: CPT

## 2023-05-12 PROCEDURE — 2580000003 HC RX 258: Performed by: NURSE PRACTITIONER

## 2023-05-12 PROCEDURE — 6370000000 HC RX 637 (ALT 250 FOR IP): Performed by: NURSE PRACTITIONER

## 2023-05-12 PROCEDURE — 82962 GLUCOSE BLOOD TEST: CPT

## 2023-05-12 PROCEDURE — 85025 COMPLETE CBC W/AUTO DIFF WBC: CPT

## 2023-05-12 PROCEDURE — 99239 HOSP IP/OBS DSCHRG MGMT >30: CPT | Performed by: INTERNAL MEDICINE

## 2023-05-12 PROCEDURE — APPSS45 APP SPLIT SHARED TIME 31-45 MINUTES: Performed by: NURSE PRACTITIONER

## 2023-05-12 RX ADMIN — VENLAFAXINE HYDROCHLORIDE 150 MG: 150 CAPSULE, EXTENDED RELEASE ORAL at 09:17

## 2023-05-12 RX ADMIN — SODIUM CHLORIDE, PRESERVATIVE FREE 10 ML: 5 INJECTION INTRAVENOUS at 09:17

## 2023-05-12 RX ADMIN — PRENATAL WITH FERROUS FUM AND FOLIC ACID 1 TABLET: 3080; 920; 120; 400; 22; 1.84; 3; 20; 10; 1; 12; 200; 27; 25; 2 TABLET ORAL at 09:17

## 2023-05-12 RX ADMIN — RIVAROXABAN 20 MG: 20 TABLET, FILM COATED ORAL at 09:17

## 2023-05-12 NOTE — PROGRESS NOTES
[address] [facility]  [place]. [place]        Date:2023                                                  Patient Name: Evelyn Hamm    MRN: [id number]    : 1940    Room: [address] [place] [address]      Evaluating OT: Frankie Officer OTR/L #[id number]     Referring Provider and Specific Provider Orders/Date:      23 1130  OT eval and treat  Start:  23 1130,   End:  23 1130,   ONE TIME,   Standing Count:  1 Occurrences,   R        Last continued at transfer on Mon May 8, 2023  2:46 PM    CARLO Brooke - NP      Placement Recommendation: Subacute vs Home Health Occupational Therapy if patient is able to meet goals        Diagnosis:   1.  TIA (transient ischemic attack)         Surgery: None       Pertinent Medical History:       Past Medical History:   Diagnosis Date    Arthritis     Cerebral artery occlusion with cerebral infarction ([facility]     Depression     DVT (deep vein thrombosis) in pregnancy     DVT of lower extremity, bilateral (HCC)     GERD (gastroesophageal reflux disease)     Hx of blood clots     Hyperlipidemia     Hypertension     cured    Nephrolithiasis          Past Surgical History:   Procedure Laterality Date    ABDOMEN SURGERY  ,     tummy tuck x2    BLADDER SUSPENSION      CHOLECYSTECTOMY, OPEN  3/21/2013    open cholecystectomy with intropertive cholangiogram    COLONOSCOPY      ENDOSCOPY, COLON, DIAGNOSTIC      FOOT SURGERY      GASTRIC BYPASS SURGERY      [address] [place]    HYSTERECTOMY ([address])  42 years ago    ME ARTHRP KNE CONDYLE&PLATU MEDIAL&LAT COMPARTMENTS N/A 2018    TOTAL RIGHT KNEE REPLACEMENT ARTHROPLASTY (JOLYNN) performed by Akash Perez DO at [facility] OR      Precautions:  Fall Risk, alarm, up with assistance     Assessment of current deficits    [x] Functional mobility  [x]ADLs  [x] Strength
[facility] Hospitalist Progress Note    Admitting Date and Time: 5/8/2023 10:21 AM  Admit Dx: TIA (transient ischemic attack) [G45.9]    Subjective:    5/9: Patient seen and examined, sitting up in chair. Pt was roomed with her , who is also a pt. Pt states she slept well. Feels much better today. States she worked with PT this am, walked up & down the [place]. Denies CP, SOB. No needs at this time. 5/10: Sleeping in chair next to  (roommate), awakens easily to voice. States she feels good, just tired after her bath this am. Denies CP, SOB. No needs at this time. 5/11: Pt sleeping, awakens easily to voice. States she is exhausted from getting a bath this am. Denies CP, SOB. Spoke with Kate Mcallister likely denying LINDSEY. Had [id number] call with physician from [facility], based on PT notes pt already surpasses criteria for discharge from [place] [address]. It was recommended during previous recent admission that pt have 24/7 care, and that recommendation appears to be the safest option again at this time-whether SNF or in-home. PMH:  has a past medical history of Arthritis, Cerebral artery occlusion with cerebral infarction ([address] Depression, DVT (deep vein thrombosis) in pregnancy, DVT of lower extremity, bilateral ([address] GERD (gastroesophageal reflux disease), Hx of blood clots, Hyperlipidemia, Hypertension, and Nephrolithiasis. REVIEW OF SYSTEMS:  no fevers, chills, cp, sob, n/v, ha, vision/hearing changes, wt changes, hot/cold flashes, other open skin lesions, diarrhea, constipation, dysuria/hematuria unless noted in HPI. Complete ROS performed with the patient and is otherwise negative. Objective:    /68   Pulse 74   Temp 98.6 °F (37 °C)   Resp 20   Ht 5' (1.524 m)   Wt 136 lb (61.7 kg)   SpO2 99%   BMI 26.56 kg/m²     PHYSICAL EXAM  General Appearance: Sleeping, awakens to voice. alert and oriented to person, place, and situation.  In no acute distress  Skin: Warm and dry, no
[facility] Hospitalist Progress Note    Admitting Date and Time: 5/8/2023 10:21 AM  Admit Dx: TIA (transient ischemic attack) [G45.9]    Subjective:    5/9: Patient seen and examined, sitting up in chair. Pt was roomed with her , who is also a pt. Pt states she slept well. Feels much better today. States she worked with PT this am, walked up & down the [place]. Denies CP, SOB. No needs at this time. 5/10: Sleeping in chair next to  (roommate), awakens easily to voice. States she feels good, just tired after her bath this am. Denies CP, SOB. No needs at this time. PMH:  has a past medical history of Arthritis, Cerebral artery occlusion with cerebral infarction ([address] Depression, DVT (deep vein thrombosis) in pregnancy, DVT of lower extremity, bilateral ([address] GERD (gastroesophageal reflux disease), Hx of blood clots, Hyperlipidemia, Hypertension, and Nephrolithiasis. REVIEW OF SYSTEMS:  no fevers, chills, cp, sob, n/v, ha, vision/hearing changes, wt changes, hot/cold flashes, other open skin lesions, diarrhea, constipation, dysuria/hematuria unless noted in HPI. Complete ROS performed with the patient and is otherwise negative. Objective:    BP (!) 171/78   Pulse 69   Temp 98.6 °F (37 °C) (Oral)   Resp 17   Ht 5' (1.524 m)   Wt 136 lb (61.7 kg)   SpO2 95%   BMI 26.56 kg/m²     PHYSICAL EXAM  General Appearance: Awake, alert and oriented to person, place, and situation. In no acute distress  Skin: Warm and dry, no rash or erythema  Head: normocephalic and atraumatic  Eyes: pupils equal, round, and reactive to light, extraocular eye movements intact, conjunctivae normal  ENT: external ear and ear canal normal bilaterally, nose without deformity  Neck: supple and non-tender without mass, no cervical lymphadenopathy  Pulmonary/Chest: clear to auscultation bilaterally- no wheezes, rales or rhonchi. Normal air movement. No respiratory distress.   Cardiovascular: normal rate,
[facility] Hospitalist Progress Note    Admitting Date and Time: 5/8/2023 10:21 AM  Admit Dx: TIA (transient ischemic attack) [G45.9]    Subjective:    5/9: Patient seen and examined, sitting up in chair. Pt was roomed with her , who is also a pt. Pt states she slept well. Feels much better today. States she worked with PT this am, walked up & down the [place]. Denies CP, SOB. No needs at this time. PMH:  has a past medical history of Arthritis, Cerebral artery occlusion with cerebral infarction ([address] Depression, DVT (deep vein thrombosis) in pregnancy, DVT of lower extremity, bilateral ([address] GERD (gastroesophageal reflux disease), Hx of blood clots, Hyperlipidemia, Hypertension, and Nephrolithiasis. REVIEW OF SYSTEMS:  no fevers, chills, cp, sob, n/v, ha, vision/hearing changes, wt changes, hot/cold flashes, other open skin lesions, diarrhea, constipation, dysuria/hematuria unless noted in HPI. Complete ROS performed with the patient and is otherwise negative. Objective:    /60   Pulse 83   Temp 97.8 °F (36.6 °C) (Oral)   Resp 17   Ht 5' (1.524 m)   Wt 136 lb (61.7 kg)   SpO2 98%   BMI 26.56 kg/m²     PHYSICAL EXAM  General Appearance: Awake, alert and oriented to person, place, and situation. In no acute distress  Skin: Warm and dry, no rash or erythema  Head: normocephalic and atraumatic  Eyes: pupils equal, round, and reactive to light, extraocular eye movements intact, conjunctivae normal  ENT: external ear and ear canal normal bilaterally, nose without deformity  Neck: supple and non-tender without mass, no cervical lymphadenopathy  Pulmonary/Chest: clear to auscultation bilaterally- no wheezes, rales or rhonchi. Normal air movement. No respiratory distress. Cardiovascular: normal rate, regular rhythm. Normal S1 and S2. No murmurs, rubs, clicks, or gallops  Abdomen: soft, non-tender, non-distended. Normal bowel sounds.  No masses or organomegaly  Extremities: no
Comprehensive Nutrition Assessment    Type and Reason for Visit:  Initial, Positive Nutrition Screen    Nutrition Recommendations/Plan:   Continue current diet order   Recommend Modify ONS to Ensure Plus HP BID        Malnutrition Assessment:  Malnutrition Status: Moderate malnutrition (05/09/23 1555)    Context:  Chronic Illness     Findings of the 6 clinical characteristics of malnutrition:  Energy Intake:  75% or less estimated energy requirements for 1 month or longer  Weight Loss:  Unable to assess     Body Fat Loss:  Mild body fat loss Orbital, Triceps   Muscle Mass Loss:  Mild muscle mass loss Temples (temporalis), Clavicles (pectoralis & deltoids), Hand (interosseous)  Fluid Accumulation:  No significant fluid accumulation     Strength:  Not Performed    Nutrition Assessment:    Pt admit w/ TIA. PMHx of CVA, depression, DVT, GERD, HLD, HTN, nephrolithiasis. Pt meets criteria for moderate malnutrition. Pt started on regular diet with Ensure compact. Will change ONS to and continue to monitor    Nutrition Related Findings:    abd soft, nontender, nondistended, active BS X4, no +1 pitting edema, I/O's WDL Wound Type: None       Current Nutrition Intake & Therapies:    Average Meal Intake: 51-75%  Average Supplements Intake: 51-75%  ADULT DIET; Regular  ADULT ORAL NUTRITION SUPPLEMENT; Dinner, Lunch, Breakfast; Standard 4 oz Oral Supplement    Anthropometric Measures:  Height: 5' (152.4 cm)  Ideal Body Weight (IBW): 100 lbs (45 kg)    Admission Body Weight: 136 lb 2 oz (61.7 kg) (4/30)  Current Body Weight: 136 lb (61.7 kg), 136 % IBW. Weight Source: Stated  Current BMI (kg/m2): 26.6  Usual Body Weight: 137 lb 14 oz (62.5 kg) (10/24/22 actual)  % Weight Change (Calculated): -1.4  Weight Adjustment For: No Adjustment  BMI Categories: Overweight (BMI 25.0-29. 9)    Estimated Daily Nutrient Needs:  Energy Requirements Based On: Formula  Weight Used for Energy Requirements: Current  Energy (kcal/day):
EEG completed. Report to follow.   Lovely Mosquera 5/9/2023
Met with the patient at the bedside. The patient stated that she was being discharged today and that she had some concerns about being home alone. She further stated that her  was not being discharged. She stated that he would be discharged to a nursing facility. We discussed the effect this would have on her being alone. She expressed concerns of transportation and meals since she was not allowed to cook any more and did not drive. This  had prayer and informed her that [facility] is available as needed.
OCCUPATIONAL THERAPY BEDSIDE TREATMENT NOTE   [facility] [employer] [facility]  [place]. [place]     Date:2023  Patient Name: Dawn Tse  MRN: [id number]  : 1940  Room: [address] [place] [address]     Evaluating OT: Coco Means OTR/L #[id number]      Referring Provider and Specific Provider Orders/Date:      23 1130   OT eval and treat  Start:  23 1130,   End:  23 1130,   ONE TIME,   Standing Count:  1 Occurrences,   R        Last continued at transfer on Mon May 8, 2023  2:46 PM    Teodora Ordonez, APRN - NP       Placement Recommendation: Subacute vs Home Health Occupational Therapy if patient is able to meet goals         Diagnosis:   1.  TIA (transient ischemic attack)         Surgery: None        Pertinent Medical History:       Past Medical History           Past Medical History:   Diagnosis Date    Arthritis      Cerebral artery occlusion with cerebral infarction ([address]      Depression      DVT (deep vein thrombosis) in pregnancy      DVT of lower extremity, bilateral (HCC)      GERD (gastroesophageal reflux disease)      Hx of blood clots      Hyperlipidemia      Hypertension       cured    Nephrolithiasis              Past Surgical History             Past Surgical History:   Procedure Laterality Date    ABDOMEN SURGERY   ,      tummy tuck x2    BLADDER SUSPENSION       CHOLECYSTECTOMY, OPEN   3/21/2013     open cholecystectomy with intropertive cholangiogram    COLONOSCOPY        ENDOSCOPY, COLON, DIAGNOSTIC        FOOT SURGERY        GASTRIC BYPASS SURGERY        [address] [place]    HYSTERECTOMY ([address])   42 years ago    [place] ARTHRP KNE CONDYLE&PLATU MEDIAL&LAT COMPARTMENTS N/A 2018     TOTAL RIGHT KNEE REPLACEMENT ARTHROPLASTY (JOLYNN) performed by Mica Thakkar DO at [facility] OR          Precautions:  Fall Risk, alarm, up with assistance      Assessment of current deficits    [x] Functional
OCCUPATIONAL THERAPY TREATMENT NOTE    [facility]   [facility]        Date:2023  Patient Name: Juanito Romero  MRN: [id number]  : 1940  Room: [address] [place] [address]     Evaluating OT: Nathaniel Dodge OTR/L #[id number]      Referring Provider and Specific Provider Orders/Date:      23 1130   OT eval and treat  Start:  23 1130,   End:  23 1130,   ONE TIME,   Standing Count:  1 Occurrences,   R        Last continued at transfer on Mon May 8, 2023  2:46 PM    CARLO Hernandez - ERIN       Placement Recommendation: Subacute vs Home Health Occupational Therapy if patient is able to meet goals         Diagnosis:   1.  TIA (transient ischemic attack)         Surgery: None        Pertinent Medical History:       Past Medical History        Past Medical History:   Diagnosis Date    Arthritis      Cerebral artery occlusion with cerebral infarction ([facility]      Depression      DVT (deep vein thrombosis) in pregnancy      DVT of lower extremity, bilateral (HCC)      GERD (gastroesophageal reflux disease)      Hx of blood clots      Hyperlipidemia      Hypertension       cured    Nephrolithiasis              Past Surgical History         Past Surgical History:   Procedure Laterality Date    ABDOMEN SURGERY   ,      tummy tuck x2    BLADDER SUSPENSION       CHOLECYSTECTOMY, OPEN   3/21/2013     open cholecystectomy with intropertive cholangiogram    COLONOSCOPY        ENDOSCOPY, COLON, DIAGNOSTIC        FOOT SURGERY        GASTRIC BYPASS SURGERY        [address] [place]    HYSTERECTOMY ([address] [facility] [address]   42 years ago    [place] ARTHRP KNE CONDYLE&PLATU MEDIAL&LAT COMPARTMENTS N/A 2018     TOTAL RIGHT KNEE REPLACEMENT ARTHROPLASTY (JOLYNN) performed by Marisol Kenyon DO at [facility] OR          Precautions:  Fall Risk, alarm, up with assistance      Assessment of current deficits    [x] Functional mobility
Physical Therapy  Physical Therapy Treatment Note/Plan of Care    Room #:  1137/1393-63  Patient Name: Tenisha Blankenship  Date Of Birth: [birth year]  MRN: [id number]    Date of Service: 5/11/2023     Tentative placement recommendation: Subacute unless patient meets goals then Home Health Physical Therapy  Equipment recommendation: Patient has needed equipment       Evaluating Physical Therapist: Rachel Villeda, PT, DPT #[id number]      Specific Provider Orders/Date/Referring Provider :       05/08/23 1130    PT eval and treat  Start:  05/08/23 1130,   End:  05/08/23 1130,   ONE TIME,   Standing Count:  1 Occurrences,   R        Last continued at transfer on Mon May 8, 2023  2:46 PM    CARLO Landin NP Acknowledge New     Admitting Diagnosis:   TIA (transient ischemic attack) [G45.9]      Surgery: none      Patient Active Problem List   Diagnosis    Hypertension    History of DVT (deep vein thrombosis)    Cerebrovascular accident (CVA) ([address]    Hyperlipidemia    Mild vascular dementia without behavioral disturbance, psychotic disturbance, mood disturbance, or anxiety (HCC)    Hypokalemia    Dehydration with hypernatremia    Confusion    Episode of recurrent major depressive disorder ([address]    Unable to care for self    Overactive bladder    TIA (transient ischemic attack)    Syncope    Moderate protein-calorie malnutrition ([address]        ASSESSMENT of Current Deficits Patient exhibits decreased strength, balance, and endurance impairing functional mobility, transfers, gait , gait distance, and tolerance to activity. Pt mildly unsteady with ambulation with no LOB, dizziness, or SOB during session. Pt required Min/ModA for assistance for stair training during session, and pt required increased time for all mobility during session.         PHYSICAL THERAPY  PLAN OF CARE       Physical therapy plan of care is established based on physician order,  patient diagnosis and clinical assessment    Current Treatment
Pt had medium bowel movement this morning. Consistency was soft mud-like. No diarrhea. Sample not sent for C diff testing due to these findings.
Lot  AM-PAC Inpatient Mobility Raw Score : 17  AM-PAC Inpatient T-Scale Score : 42.13  Mobility Inpatient CMS 0-100% Score: 50.57  Mobility Inpatient CMS G-Code Modifier : CK    Nursing cleared patient for PT evaluation. The admitting diagnosis and active problem list as listed above have been reviewed prior to the initiation of this evaluation. OBJECTIVE:   Initial Evaluation  Date: 5/9/2023 Treatment Date:     Short Term/ Long Term   Goals   Was pt agreeable to Eval/treatment? Yes  To be met in 1 days   Pain level 0/10       Bed Mobility  Using rails and head of bed elevated:     Rolling: Not assessed patient in chair    Supine to sit: Not assessed patient in chair    Sit to supine: Not assessed patient in chair    Scooting: Not assessed patient in chair    Rolling: Independent    Supine to sit:  Independent    Sit to supine: Independent    Scooting: Independent     Transfers Sit to stand: Supervision    Sit to stand: Independent    Ambulation     4 x 50 feet using  wheeled walker with SBA/Claudia   for walker control, walker approximation, balance, upright, weight shift, and safety    > 150 feet using  wheeled walker with Supervision     Stair negotiation: ascended and descended   5 steps, 1 rail with Min/ModA    5 steps, 1 rail with SBA   ROM Within functional limits    Increase range of motion 10% of affected joints    Strength BUE:  refer to OT eval  RLE:  4/5  LLE:  4/5  Increase strength in affected mm groups by 1/3 grade   Balance Sitting EOB:  fair +  Dynamic Standing:  fair with [employer]  Sitting EOB:  good   Dynamic Standing: fair + with [employer]     Patient is Alert & Oriented x person, place, time, and situation and follows directions    Sensation:  Patient  denies numbness/tingling   Edema:  no   Endurance: fair      Vitals: room air   Blood Pressure at rest  Blood Pressure during session    Heart Rate at rest  Heart Rate during session 84-96   SPO2 at rest %  SPO2 during session 96%     Patient education  Patient

## 2023-05-12 NOTE — CARE COORDINATION
Notified that patient was denied SNF under [employer]. Spoke with Daughter, Laure Holland, they do not have funds to pay privately at nursing facility. Plan is home. DC order noted. Notified [facility] of DC home today and resumption of care order. Family has Private duty list, care patrol information and has a meeting with [facility] set up for Monday.

## 2023-05-15 ENCOUNTER — CARE COORDINATION (OUTPATIENT)
Dept: CASE MANAGEMENT | Age: 83
End: 2023-05-15

## 2023-05-15 NOTE — TELEPHONE ENCOUNTER
Daughter called back and stated shantell is no longer a patient here.  She is going to a different PCP

## 2023-05-15 NOTE — TELEPHONE ENCOUNTER
LMOM advising patients POA Catherin Osler) to call office back regarding hospital follow up.  PCP is listed as David Jewell

## 2023-05-15 NOTE — CARE COORDINATION
[facility] Care Transitions Initial Follow Up Call    Call within 2 business days of discharge: Yes        Patient: Sandra Ayoub Patient : 1940   MRN: [id number]  Reason for Admission: Syncope  Discharge Date: 23 RARS: Readmission Risk Score: 12.9      Last Discharge [address]       Date Complaint Diagnosis Description Type Department Provider    23 Extremity Weakness TIA (transient ischemic attack) ED to Hosp-Admission (Discharged) (ADMITTED) Romana Butte, MD; Rohit Dan Car. .. CTN received voice message from [facility]. -CTN returned call to ger Yuan answer, HIPAA compliant message left on voice mail.     Triston Prado RN

## 2023-05-15 NOTE — CARE COORDINATION
[facility] Care Transitions Initial Follow Up Call    Call within 2 business days of discharge: Yes        Patient: Rene Russ Patient : 1940   MRN: [id number]  Reason for Admission: Syncope  Discharge Date: 23 RARS: Readmission Risk Score: 12.9      Last Discharge  Street       Date Complaint Diagnosis Description Type Department Provider    23 Extremity Weakness TIA (transient ischemic attack) ED to Hosp-Admission (Discharged) (ADMITTED) Melissa Navarro MD; Renetta Costello Car. .. Attempted to reach patient's daughter/caregiver, Scott Coreas, by phone for initial post hospital discharge follow up. HIPAA compliant message left on patient's voicemail; CTN contact information provided. CTN spoke with Shankar Rod from [facility] and confirmed [facility] 5/15/2023. CTN will forward note to office of PCP for HFU appointment scheduling.       Zuly Yan RN

## 2023-05-16 ENCOUNTER — CARE COORDINATION (OUTPATIENT)
Dept: CASE MANAGEMENT | Age: 83
End: 2023-05-16

## 2023-05-16 NOTE — CARE COORDINATION
[facility] Care Transitions Initial Follow Up Call    Call within 2 business days of discharge: Yes      Patient: Rozanne Eisenmenger Patient : 1940   MRN: [id number]  Reason for Admission: Syncope  Discharge Date: 23 RARS: Readmission Risk Score: 12.9      Last Discharge  Street       Date Complaint Diagnosis Description Type Department Provider    23 Extremity Weakness TIA (transient ischemic attack) ED to Hosp-Admission (Discharged) (ADMITTED) Elham Vaughn MD; Jasmeet Ha Car. .. Second attempt to reach the patient's daughter/caregiver, Mukul Moran,  for initial Care Transition call post hospital discharge. HIPAA compliant message left on voicemail; CTN contact information provided. Episode to be resolved and CTN to sign off if return call is not received from patient's daughter. Noted in chart from 5/15/2023 encounter:  Daughter called back and stated shantell is no longer a patient here.  She is going to a different PCP      Tad Saldana RN

## 2023-06-22 ENCOUNTER — OUTSIDE SERVICES (OUTPATIENT)
Dept: PRIMARY CARE CLINIC | Age: 83
End: 2023-06-22
Payer: MEDICARE

## 2023-06-22 DIAGNOSIS — E44.0 MODERATE PROTEIN-CALORIE MALNUTRITION (HCC): Chronic | ICD-10-CM

## 2023-06-22 DIAGNOSIS — G45.9 TIA (TRANSIENT ISCHEMIC ATTACK): ICD-10-CM

## 2023-06-22 DIAGNOSIS — I10 PRIMARY HYPERTENSION: ICD-10-CM

## 2023-06-22 DIAGNOSIS — F01.A0 MILD VASCULAR DEMENTIA WITHOUT BEHAVIORAL DISTURBANCE, PSYCHOTIC DISTURBANCE, MOOD DISTURBANCE, OR ANXIETY (HCC): ICD-10-CM

## 2023-06-22 DIAGNOSIS — R53.1 WEAKNESS: ICD-10-CM

## 2023-06-22 DIAGNOSIS — E78.2 MIXED HYPERLIPIDEMIA: ICD-10-CM

## 2023-06-22 DIAGNOSIS — R55 SYNCOPE, UNSPECIFIED SYNCOPE TYPE: Primary | ICD-10-CM

## 2023-06-22 PROCEDURE — 99306 1ST NF CARE HIGH MDM 50: CPT | Performed by: INTERNAL MEDICINE

## 2023-06-22 PROCEDURE — 99497 ADVNCD CARE PLAN 30 MIN: CPT | Performed by: INTERNAL MEDICINE

## 2023-06-28 ENCOUNTER — OUTSIDE SERVICES (OUTPATIENT)
Dept: PRIMARY CARE CLINIC | Age: 83
End: 2023-06-28

## 2023-06-28 DIAGNOSIS — F01.A0 MILD VASCULAR DEMENTIA WITHOUT BEHAVIORAL DISTURBANCE, PSYCHOTIC DISTURBANCE, MOOD DISTURBANCE, OR ANXIETY (HCC): Primary | ICD-10-CM

## 2023-06-28 DIAGNOSIS — E44.0 MODERATE PROTEIN-CALORIE MALNUTRITION (HCC): ICD-10-CM

## 2023-06-28 DIAGNOSIS — M19.90 ARTHRITIS: ICD-10-CM

## 2023-06-28 DIAGNOSIS — E78.2 MIXED HYPERLIPIDEMIA: ICD-10-CM

## 2023-06-28 DIAGNOSIS — R53.1 WEAKNESS: ICD-10-CM

## 2023-06-28 DIAGNOSIS — I10 PRIMARY HYPERTENSION: ICD-10-CM

## 2023-06-28 ASSESSMENT — VISUAL ACUITY: OU: 1

## 2023-07-05 ENCOUNTER — OFFICE VISIT (OUTPATIENT)
Dept: ORTHOPEDIC SURGERY | Age: 83
End: 2023-07-05
Payer: MEDICARE

## 2023-07-05 VITALS — HEIGHT: 60 IN | TEMPERATURE: 98 F | WEIGHT: 136 LBS | BODY MASS INDEX: 26.7 KG/M2

## 2023-07-05 DIAGNOSIS — G89.29 CHRONIC HEEL PAIN, LEFT: Primary | ICD-10-CM

## 2023-07-05 DIAGNOSIS — M79.672 CHRONIC HEEL PAIN, LEFT: Primary | ICD-10-CM

## 2023-07-05 PROCEDURE — 1124F ACP DISCUSS-NO DSCNMKR DOCD: CPT | Performed by: ORTHOPAEDIC SURGERY

## 2023-07-05 PROCEDURE — 99203 OFFICE O/P NEW LOW 30 MIN: CPT | Performed by: ORTHOPAEDIC SURGERY

## 2023-07-05 ASSESSMENT — VISUAL ACUITY: OU: 1

## 2023-07-05 NOTE — PROGRESS NOTES
Visit Date: 6/28/23  Ingrid Minors  1940  female 82 y.o. Subjective:    CC: Patient presents with syncope and weakness. Patient presents for follow up of dementia, htn, and hyperlipidemia. HPI:  Extremity Weakness  This is a new problem. The current episode started 1 to 4 weeks ago. The problem occurs intermittently. The problem has been gradually improving. Associated symptoms include joint swelling. Nothing aggravates the symptoms. Treatments tried: working with therapy. Memory Loss    Patient reports onset of memory loss was more than 1 year ago. Onset quality is gradual.     Symptoms associated with memory loss include changes in short-term memory, changes in long-term memory and day/night behavior changes. Family and/or patient concerns for memory loss include medication errors, wandering and driving. Patient lives in a/an own home. Hypertension  This is a chronic problem. The current episode started more than 1 year ago. The problem is unchanged. The problem is controlled. There are no associated agents to hypertension. Risk factors for coronary artery disease include dyslipidemia. Treatments tried: taking medications, no medication side effects. The current treatment provides mild improvement. There are no compliance problems. Hyperlipidemia  This is a chronic problem. The current episode started more than 1 year ago. The problem is controlled. Recent lipid tests were reviewed and are variable. There are no known factors aggravating her hyperlipidemia. Treatments tried: taking medications, no medication side effects. The current treatment provides mild improvement of lipids. There are no compliance problems. Risk factors for coronary artery disease include hypertension and dyslipidemia. Arthritis  Presents for follow-up (has edema of the left lower leg no dvt has arthritis of ankle knee and hip) visit. She complains of pain and joint swelling. The symptoms have been worsening.

## 2023-07-05 NOTE — PROGRESS NOTES
Leonides Lassiter is a 82 y.o. female, who presents   Chief Complaint   Patient presents with    Foot Pain     Left heel pain for the past few months. HPI[de-identified] Left heel pains been present for quite some time with no history of injury. Mrs. Collier lives in a care home with her . She has problems with weightbearing on the left foot with pain around the posterior and plantar heel. She is also had issues with swelling in the left lower extremity. She has apparently been put on Lasix to try to decrease this. Her daughter was present with her in the visit today and supplemented history. Allergies; medications; past medical, surgical, family, and social history; and problem list have been reviewed today and updated as indicated in this encounter - see below following Ortho specifics. Musculoskeletal: Mrs. Collier walks slowly. There is only slight favoring of the left foot. He has edema in the left leg and knee area. She is wearing short pression stockings. He has some tenderness in the forefoot with mobilization. Ankle range of motion is fairly good with good stability and ankle no crepitus. There is some tenderness palpation directly over the posterior and lateral heel area with only slight discomfort plantar. Radiologic Studies: Normal imaging which had been done at the nursing home was not supplied for review. ASSESSMENT:  Michelle Sarabia was seen today for foot pain. Diagnoses and all orders for this visit:    Chronic heel pain, left     Treatment alternatives were reviewed including medical and physical therapies, injections, and surgical options, expected risks benefits and likely outcome of each were discussed in detail, questions asked and answered and understood. We discussed the symptoms as well as physical findings. The edema in her leg could be causing some of her foot pain. This would be a medical problem and not an orthopedic problem.   Should continue wear the
patient

## 2023-07-20 ENCOUNTER — OUTSIDE SERVICES (OUTPATIENT)
Dept: PRIMARY CARE CLINIC | Age: 83
End: 2023-07-20
Payer: MEDICARE

## 2023-07-20 DIAGNOSIS — R53.1 WEAKNESS: ICD-10-CM

## 2023-07-20 DIAGNOSIS — F01.A0 MILD VASCULAR DEMENTIA WITHOUT BEHAVIORAL DISTURBANCE, PSYCHOTIC DISTURBANCE, MOOD DISTURBANCE, OR ANXIETY (HCC): Primary | ICD-10-CM

## 2023-07-20 DIAGNOSIS — M19.90 ARTHRITIS: ICD-10-CM

## 2023-07-20 DIAGNOSIS — E78.2 MIXED HYPERLIPIDEMIA: ICD-10-CM

## 2023-07-20 DIAGNOSIS — E44.0 MODERATE PROTEIN-CALORIE MALNUTRITION (HCC): ICD-10-CM

## 2023-07-20 DIAGNOSIS — I10 PRIMARY HYPERTENSION: ICD-10-CM

## 2023-07-20 PROCEDURE — 99309 SBSQ NF CARE MODERATE MDM 30: CPT | Performed by: INTERNAL MEDICINE

## 2023-07-24 ASSESSMENT — VISUAL ACUITY: OU: 1

## 2023-07-24 NOTE — PROGRESS NOTES
General: Skin is warm and dry. Findings: No bruising, ecchymosis, lesion or rash. Neurological:      General: No focal deficit present. Mental Status: Mental status is at baseline. Cranial Nerves: No cranial nerve deficit. Deep Tendon Reflexes: Reflexes normal.   Psychiatric:         Mood and Affect: Mood and affect normal. Mood is not depressed. Behavior: Behavior normal. Behavior is not agitated. Cognition and Memory: Cognition is impaired. Memory is impaired. Comments: No apparent anxiety       Assessment & Plan:  1. Mild vascular dementia without behavioral disturbance, psychotic disturbance, mood disturbance, or anxiety ([address])  2. Moderate protein-calorie malnutrition ([address])  3. Primary hypertension  4. Weakness  5. Mixed hyperlipidemia  6.  Arthritis    Chart reviewed   Medications reviewed   Working with therapy improving slowly   Monitor labs   Continue current treatment      IShay MA  am scribing for Dr. Billy Irby MA   IjK MD, personally performed the services described in this documentation as scribed by Shay Solis and it is both accurate and complete

## 2023-08-16 ENCOUNTER — OUTSIDE SERVICES (OUTPATIENT)
Dept: PRIMARY CARE CLINIC | Age: 83
End: 2023-08-16

## 2023-08-16 DIAGNOSIS — M19.90 ARTHRITIS: ICD-10-CM

## 2023-08-16 DIAGNOSIS — R60.1 GENERALIZED EDEMA: ICD-10-CM

## 2023-08-16 DIAGNOSIS — F01.A0 MILD VASCULAR DEMENTIA WITHOUT BEHAVIORAL DISTURBANCE, PSYCHOTIC DISTURBANCE, MOOD DISTURBANCE, OR ANXIETY (HCC): ICD-10-CM

## 2023-08-16 DIAGNOSIS — E87.6 HYPOKALEMIA: Primary | ICD-10-CM

## 2023-08-16 DIAGNOSIS — R53.1 WEAKNESS: ICD-10-CM

## 2023-08-16 DIAGNOSIS — I10 PRIMARY HYPERTENSION: ICD-10-CM

## 2023-08-16 DIAGNOSIS — E44.0 MODERATE PROTEIN-CALORIE MALNUTRITION (HCC): ICD-10-CM

## 2023-08-16 DIAGNOSIS — E78.2 MIXED HYPERLIPIDEMIA: ICD-10-CM

## 2023-08-18 ASSESSMENT — VISUAL ACUITY: OU: 1

## 2023-08-18 NOTE — PROGRESS NOTES
Visit Date: 8/16/23  Seema Melo  1940  female 82 y.o. Subjective:    CC: Patient presents with syncope and weakness. Patient presents for follow up of dementia, htn, and hyperlipidemia. HPI:  Extremity Weakness  This is a new problem. The current episode started more than 1 month ago. The problem occurs intermittently. The problem has been gradually improving. Nothing aggravates the symptoms. Treatments tried: working with therapy. Memory Loss    Patient reports onset of memory loss was more than 1 year ago. Onset quality is gradual.     Symptoms associated with memory loss include changes in short-term memory, changes in long-term memory and day/night behavior changes. Family and/or patient concerns for memory loss include medication errors, wandering and driving. Patient lives in a/an own home. Hypertension  This is a chronic problem. The current episode started more than 1 year ago. The problem is unchanged. The problem is controlled. There are no associated agents to hypertension. Risk factors for coronary artery disease include dyslipidemia. Treatments tried: taking medications, no medication side effects. The current treatment provides mild improvement. There are no compliance problems. Hyperlipidemia  This is a chronic problem. The current episode started more than 1 year ago. The problem is controlled. Recent lipid tests were reviewed and are variable. There are no known factors aggravating her hyperlipidemia. Treatments tried: taking medications, no medication side effects. The current treatment provides mild improvement of lipids. There are no compliance problems. Risk factors for coronary artery disease include hypertension and dyslipidemia. Arthritis  Presents for follow-up (has edema of the left lower leg no dvt has arthritis of ankle knee and hip) visit. The symptoms have been improving. Affected locations include the left ankle and left knee.  Her pain is at a severity

## 2023-09-22 ENCOUNTER — OUTSIDE SERVICES (OUTPATIENT)
Dept: PRIMARY CARE CLINIC | Age: 83
End: 2023-09-22
Payer: MEDICARE

## 2023-09-22 DIAGNOSIS — E78.2 MIXED HYPERLIPIDEMIA: ICD-10-CM

## 2023-09-22 DIAGNOSIS — E44.0 MODERATE PROTEIN-CALORIE MALNUTRITION (HCC): ICD-10-CM

## 2023-09-22 DIAGNOSIS — R53.1 WEAKNESS: ICD-10-CM

## 2023-09-22 DIAGNOSIS — M19.90 ARTHRITIS: ICD-10-CM

## 2023-09-22 DIAGNOSIS — I10 PRIMARY HYPERTENSION: ICD-10-CM

## 2023-09-22 DIAGNOSIS — F01.A0 MILD VASCULAR DEMENTIA WITHOUT BEHAVIORAL DISTURBANCE, PSYCHOTIC DISTURBANCE, MOOD DISTURBANCE, OR ANXIETY (HCC): Primary | ICD-10-CM

## 2023-09-22 PROCEDURE — 99316 NF DSCHRG MGMT 30 MIN+: CPT | Performed by: INTERNAL MEDICINE

## 2023-10-02 ASSESSMENT — VISUAL ACUITY: OU: 1

## 2023-10-02 NOTE — PROGRESS NOTES
Visit Date: 9/22/23  Ingrid Minors  1940  female 82 y.o. Subjective:    CC: Patient presents with  weakness. Patient presents for follow up of dementia, htn, and hyperlipidemia. HPI:  Extremity Weakness  This is a new problem. The current episode started more than 1 month ago. The problem occurs intermittently. The problem has been gradually improving. Nothing aggravates the symptoms. Treatments tried: working with therapy. Memory Loss    Patient reports onset of memory loss was more than 1 year ago. Onset quality is gradual.     Symptoms associated with memory loss include changes in short-term memory, changes in long-term memory and day/night behavior changes. Family and/or patient concerns for memory loss include medication errors, wandering and driving. Patient lives in a/an own home. Hypertension  This is a chronic problem. The current episode started more than 1 year ago. The problem is unchanged. The problem is controlled. There are no associated agents to hypertension. Risk factors for coronary artery disease include dyslipidemia. Treatments tried: taking medications, no medication side effects. The current treatment provides mild improvement. There are no compliance problems. Hyperlipidemia  This is a chronic problem. The current episode started more than 1 year ago. The problem is controlled. Recent lipid tests were reviewed and are variable. There are no known factors aggravating her hyperlipidemia. Treatments tried: taking medications, no medication side effects. The current treatment provides mild improvement of lipids. There are no compliance problems. Risk factors for coronary artery disease include hypertension and dyslipidemia. Arthritis  Presents for follow-up (has edema of the left lower leg no dvt has arthritis of ankle knee and hip) visit. The symptoms have been improving. Affected locations include the left ankle and left knee. Her pain is at a severity of 1/10.

## 2023-10-29 ENCOUNTER — HOSPITAL ENCOUNTER (INPATIENT)
Age: 83
LOS: 3 days | Discharge: NURSING, REHAB OR LONG-TERM CARE | DRG: 057 | End: 2023-11-01
Attending: EMERGENCY MEDICINE | Admitting: INTERNAL MEDICINE
Payer: MEDICARE

## 2023-10-29 DIAGNOSIS — G30.1 BEHAVIORAL DISTURBANCE DUE TO LATE ONSET ALZHEIMER DEMENTIA (HCC): Primary | ICD-10-CM

## 2023-10-29 DIAGNOSIS — F02.818 BEHAVIORAL DISTURBANCE DUE TO LATE ONSET ALZHEIMER DEMENTIA (HCC): Primary | ICD-10-CM

## 2023-10-29 DIAGNOSIS — R41.82 ALTERED MENTAL STATUS, UNSPECIFIED ALTERED MENTAL STATUS TYPE: ICD-10-CM

## 2023-10-29 LAB
ALBUMIN SERPL-MCNC: 4.1 G/DL (ref 3.5–5.2)
ALP SERPL-CCNC: 132 U/L (ref 35–104)
ALT SERPL-CCNC: 43 U/L (ref 0–32)
ANION GAP SERPL CALCULATED.3IONS-SCNC: 11 MMOL/L (ref 7–16)
AST SERPL-CCNC: 39 U/L (ref 0–31)
BACTERIA URNS QL MICRO: ABNORMAL
BASOPHILS # BLD: 0.02 K/UL (ref 0–0.2)
BASOPHILS NFR BLD: 0 % (ref 0–2)
BILIRUB SERPL-MCNC: 0.6 MG/DL (ref 0–1.2)
BILIRUB UR QL STRIP: NEGATIVE
BUN SERPL-MCNC: 21 MG/DL (ref 6–23)
CALCIUM SERPL-MCNC: 9.1 MG/DL (ref 8.6–10.2)
CASTS #/AREA URNS LPF: ABNORMAL /LPF
CHLORIDE SERPL-SCNC: 98 MMOL/L (ref 98–107)
CLARITY UR: ABNORMAL
CO2 SERPL-SCNC: 29 MMOL/L (ref 22–29)
COLOR UR: YELLOW
CREAT SERPL-MCNC: 0.8 MG/DL (ref 0.5–1)
EOSINOPHIL # BLD: 0.04 K/UL (ref 0.05–0.5)
EOSINOPHILS RELATIVE PERCENT: 0 % (ref 0–6)
EPI CELLS #/AREA URNS HPF: ABNORMAL /HPF
ERYTHROCYTE [DISTWIDTH] IN BLOOD BY AUTOMATED COUNT: 13.7 % (ref 11.5–15)
GFR SERPL CREATININE-BSD FRML MDRD: >60 ML/MIN/1.73M2
GLUCOSE SERPL-MCNC: 80 MG/DL (ref 74–99)
GLUCOSE UR STRIP-MCNC: NEGATIVE MG/DL
HCT VFR BLD AUTO: 46.2 % (ref 34–48)
HGB BLD-MCNC: 15.6 G/DL (ref 11.5–15.5)
HGB UR QL STRIP.AUTO: NEGATIVE
IMM GRANULOCYTES # BLD AUTO: <0.03 K/UL (ref 0–0.58)
IMM GRANULOCYTES NFR BLD: 0 % (ref 0–5)
KETONES UR STRIP-MCNC: ABNORMAL MG/DL
LEUKOCYTE ESTERASE UR QL STRIP: ABNORMAL
LYMPHOCYTES NFR BLD: 3.55 K/UL (ref 1.5–4)
LYMPHOCYTES RELATIVE PERCENT: 36 % (ref 20–42)
MAGNESIUM SERPL-MCNC: 1.8 MG/DL (ref 1.6–2.6)
MCH RBC QN AUTO: 31.5 PG (ref 26–35)
MCHC RBC AUTO-ENTMCNC: 33.8 G/DL (ref 32–34.5)
MCV RBC AUTO: 93.1 FL (ref 80–99.9)
MONOCYTES NFR BLD: 0.82 K/UL (ref 0.1–0.95)
MONOCYTES NFR BLD: 8 % (ref 2–12)
MUCOUS THREADS URNS QL MICRO: PRESENT
NEUTROPHILS NFR BLD: 54 % (ref 43–80)
NEUTS SEG NFR BLD: 5.3 K/UL (ref 1.8–7.3)
NITRITE UR QL STRIP: NEGATIVE
PH UR STRIP: 5.5 [PH] (ref 5–9)
PLATELET # BLD AUTO: 175 K/UL (ref 130–450)
PMV BLD AUTO: 12.2 FL (ref 7–12)
POTASSIUM SERPL-SCNC: 4.4 MMOL/L (ref 3.5–5)
PROT SERPL-MCNC: 6.9 G/DL (ref 6.4–8.3)
PROT UR STRIP-MCNC: NEGATIVE MG/DL
RBC # BLD AUTO: 4.96 M/UL (ref 3.5–5.5)
RBC #/AREA URNS HPF: ABNORMAL /HPF
SODIUM SERPL-SCNC: 138 MMOL/L (ref 132–146)
SP GR UR STRIP: >1.03 (ref 1–1.03)
T4 SERPL-MCNC: 10.7 UG/DL (ref 4.5–11.7)
TROPONIN I SERPL HS-MCNC: 17 NG/L (ref 0–9)
TSH SERPL DL<=0.05 MIU/L-ACNC: 1.34 UIU/ML (ref 0.27–4.2)
UROBILINOGEN UR STRIP-ACNC: 0.2 EU/DL (ref 0–1)
WBC #/AREA URNS HPF: ABNORMAL /HPF
WBC OTHER # BLD: 9.8 K/UL (ref 4.5–11.5)

## 2023-10-29 PROCEDURE — 85025 COMPLETE CBC W/AUTO DIFF WBC: CPT

## 2023-10-29 PROCEDURE — 99285 EMERGENCY DEPT VISIT HI MDM: CPT

## 2023-10-29 PROCEDURE — 84436 ASSAY OF TOTAL THYROXINE: CPT

## 2023-10-29 PROCEDURE — 84443 ASSAY THYROID STIM HORMONE: CPT

## 2023-10-29 PROCEDURE — 93005 ELECTROCARDIOGRAM TRACING: CPT | Performed by: EMERGENCY MEDICINE

## 2023-10-29 PROCEDURE — 1200000000 HC SEMI PRIVATE

## 2023-10-29 PROCEDURE — 84484 ASSAY OF TROPONIN QUANT: CPT

## 2023-10-29 PROCEDURE — 83735 ASSAY OF MAGNESIUM: CPT

## 2023-10-29 PROCEDURE — 81001 URINALYSIS AUTO W/SCOPE: CPT

## 2023-10-29 PROCEDURE — 80053 COMPREHEN METABOLIC PANEL: CPT

## 2023-10-29 ASSESSMENT — ENCOUNTER SYMPTOMS
WHEEZING: 0
SORE THROAT: 0
DIARRHEA: 0
CHEST TIGHTNESS: 0
COUGH: 0
BACK PAIN: 0
ABDOMINAL PAIN: 0
NAUSEA: 0
SHORTNESS OF BREATH: 0
VOMITING: 0

## 2023-10-29 ASSESSMENT — PATIENT HEALTH QUESTIONNAIRE - PHQ9
SUM OF ALL RESPONSES TO PHQ QUESTIONS 1-9: 0
1. LITTLE INTEREST OR PLEASURE IN DOING THINGS: 0
SUM OF ALL RESPONSES TO PHQ QUESTIONS 1-9: 0
SUM OF ALL RESPONSES TO PHQ9 QUESTIONS 1 & 2: 0
SUM OF ALL RESPONSES TO PHQ QUESTIONS 1-9: 0
2. FEELING DOWN, DEPRESSED OR HOPELESS: 0
SUM OF ALL RESPONSES TO PHQ QUESTIONS 1-9: 0
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
## 2023-10-30 PROBLEM — F03.918 DEMENTIA WITH BEHAVIORAL DISTURBANCE (HCC): Status: ACTIVE | Noted: 2023-10-30

## 2023-10-30 LAB
EKG ATRIAL RATE: 69 BPM
EKG P AXIS: 78 DEGREES
EKG P-R INTERVAL: 174 MS
EKG Q-T INTERVAL: 402 MS
EKG QRS DURATION: 68 MS
EKG QTC CALCULATION (BAZETT): 430 MS
EKG R AXIS: 63 DEGREES
EKG T AXIS: 54 DEGREES
EKG VENTRICULAR RATE: 69 BPM

## 2023-10-30 PROCEDURE — 1200000000 HC SEMI PRIVATE

## 2023-10-30 PROCEDURE — 97535 SELF CARE MNGMENT TRAINING: CPT

## 2023-10-30 PROCEDURE — 97165 OT EVAL LOW COMPLEX 30 MIN: CPT

## 2023-10-30 PROCEDURE — 6370000000 HC RX 637 (ALT 250 FOR IP): Performed by: INTERNAL MEDICINE

## 2023-10-30 PROCEDURE — 93010 ELECTROCARDIOGRAM REPORT: CPT | Performed by: INTERNAL MEDICINE

## 2023-10-30 PROCEDURE — 97161 PT EVAL LOW COMPLEX 20 MIN: CPT | Performed by: PHYSICAL THERAPIST

## 2023-10-30 PROCEDURE — 97530 THERAPEUTIC ACTIVITIES: CPT | Performed by: PHYSICAL THERAPIST

## 2023-10-30 PROCEDURE — 6370000000 HC RX 637 (ALT 250 FOR IP)

## 2023-10-30 RX ORDER — ACETAMINOPHEN 500 MG
500 TABLET ORAL EVERY 6 HOURS PRN
Status: DISCONTINUED | OUTPATIENT
Start: 2023-10-30 | End: 2023-11-01 | Stop reason: HOSPADM

## 2023-10-30 RX ORDER — VENLAFAXINE HYDROCHLORIDE 150 MG/1
150 CAPSULE, EXTENDED RELEASE ORAL DAILY
Status: DISCONTINUED | OUTPATIENT
Start: 2023-10-30 | End: 2023-11-01 | Stop reason: HOSPADM

## 2023-10-30 RX ORDER — ATORVASTATIN CALCIUM 40 MG/1
40 TABLET, FILM COATED ORAL NIGHTLY
Status: DISCONTINUED | OUTPATIENT
Start: 2023-10-30 | End: 2023-11-01 | Stop reason: HOSPADM

## 2023-10-30 RX ORDER — MECOBALAMIN 5000 MCG
5 TABLET,DISINTEGRATING ORAL EVERY EVENING
Status: DISCONTINUED | OUTPATIENT
Start: 2023-10-30 | End: 2023-11-01 | Stop reason: HOSPADM

## 2023-10-30 RX ADMIN — VENLAFAXINE HYDROCHLORIDE 150 MG: 150 CAPSULE, EXTENDED RELEASE ORAL at 08:46

## 2023-10-30 RX ADMIN — ATORVASTATIN CALCIUM 40 MG: 40 TABLET, FILM COATED ORAL at 00:29

## 2023-10-30 RX ADMIN — ATORVASTATIN CALCIUM 40 MG: 40 TABLET, FILM COATED ORAL at 20:30

## 2023-10-30 RX ADMIN — Medication 5 MG: at 20:33

## 2023-10-30 NOTE — CARE COORDINATION
Case Management Assessment  Initial Evaluation    Date/Time of Evaluation: 10/30/2023 12:25 PM  Assessment Completed by: CHARLES Oliveira    If patient is discharged prior to next notation, then this note serves as note for discharge by case management. 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 Yes    Chart Reviewed: Yes      History Provided by: Patient  Patient Orientation: Alert and Oriented, Person, Place    Patient Cognition: Alert        Advance Directives:      Code Status: Prior   Patient's Primary Decision Maker is: Named in [address] [place] [address] (Dtr enStage is POA for healthcare, pt is alert/oriented times 3,  dx of dementia.)    Primary Decision Maker: Unique Mendoza M-POA - Child - [phone]    Secondary Decision Maker: Aliza Barraza [address] [place] [address] Child - [phone]    Discharge Planning:    Patient lives with: Family Members lives with granddtr whom works. Dtr also works. Type of Home: House    Primary Care Giver: Other (Comment) (resides with granddtr whom works. pt relays her dtr unique  works also.)    Patient Support Systems include: Children lives with granddtr. Current Financial resources:    Current community resources:    Current services prior to admission: None            Current DME:  cane, rollator shower chair. Type of Home Care services:  None    ADLS  Prior functional level: Assistance with the following:, Mobility (uses cane or rollator per pt)  Current functional level:  Other (see comment) (therapy ordered,pt

## 2023-10-30 NOTE — PROGRESS NOTES
[facility]  [address] [place]        Date:10/30/2023                                                  Patient Name: Maureen Wheeler    MRN: [id number]    : 1940    Room: [address] [place] [address]      Evaluating OT: Mariya Loyd OTR/L; [id number]     Referring Provider and Specific Provider Orders/Date:      10/30/23 0015  OT eval and treat  Start:  10/30/23 0015,   End:  10/30/23 0015,   ONE TIME,   Standing Count:  1 Occurrences,   R         Patricia Cartwright MD      Placement Recommendation:  [place] with assistance from family        Diagnosis:   1. 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  Altered mental status, unspecified altered mental status type         Surgery: none       Pertinent Medical History:       Past Medical History:   Diagnosis Date    Arthritis     Cerebral artery occlusion with cerebral infarction ([address])     Depression     DVT (deep vein thrombosis) in pregnancy     DVT of lower extremity, bilateral (HCC)     GERD (gastroesophageal reflux disease)     Hx of blood clots     Hyperlipidemia     Hypertension     cured    Nephrolithiasis          Past Surgical History:   Procedure Laterality Date    ABDOMEN SURGERY  ,     tummy tuck x2    BLADDER SUSPENSION      CHOLECYSTECTOMY, OPEN  3/21/2013    open cholecystectomy with intropertive cholangiogram    COLONOSCOPY      ENDOSCOPY, COLON, DIAGNOSTIC      FOOT SURGERY      GASTRIC BYPASS SURGERY      [address] [place] [address]    HYSTERECTOMY ( [facility])  42 years ago    HI ARTHRP KNE CONDYLE&PLATU MEDIAL&LAT COMPARTMENTS N/A 2018    TOTAL RIGHT KNEE REPLACEMENT ARTHROPLASTY (JOLYNN) performed by Cole Diaz DO at [facility] OR      Precautions:  Fall Risk, Alzheimer Dementia, [place] in L ear      Assessment of current deficits:     [x] Functional mobility  [x]ADLs  [x] Strength

## 2023-10-30 NOTE — H&P
bilat symmetrical expansion, no wheeze, rales, or rhonchi  Abdomen: Bowel sounds present, soft, nontender, no peritoneal signs  Extremities:  No clubbing, cyanosis, or edema  Skin:  Warm and dry, no open lesions or rash  Neuro:  Cranial nerves 2-12 intact, no focal deficits  Breast: deferred  Rectal: deferred  Genitalia:  deferred    LABS:  Lab Results   Component Value Date/Time    WBC 9.8 10/29/2023 08:42 PM    RBC 4.96 10/29/2023 08:42 PM    HGB 15.6 10/29/2023 08:42 PM    HCT 46.2 10/29/2023 08:42 PM    MCV 93.1 10/29/2023 08:42 PM    MCH 31.5 10/29/2023 08:42 PM    MCHC 33.8 10/29/2023 08:42 PM    RDW 13.7 10/29/2023 08:42 PM     10/29/2023 08:42 PM    MPV 12.2 10/29/2023 08:42 PM     Lab Results   Component Value Date/Time     10/29/2023 08:42 PM    K 4.4 10/29/2023 08:42 PM    K 4.0 05/12/2023 04:25 AM    CL 98 10/29/2023 08:42 PM    CO2 29 10/29/2023 08:42 PM    BUN 21 10/29/2023 08:42 PM    CREATININE 0.8 10/29/2023 08:42 PM    GFRAA >60 10/04/2022 11:03 AM    LABGLOM >60 10/29/2023 08:42 PM    GLUCOSE 80 10/29/2023 08:42 PM    GLUCOSE 99 07/02/2011 05:55 AM    PROT 6.9 10/29/2023 08:42 PM    LABALBU 4.1 10/29/2023 08:42 PM    LABALBU 4.1 06/24/2011 11:45 AM    CALCIUM 9.1 10/29/2023 08:42 PM    BILITOT 0.6 10/29/2023 08:42 PM    ALKPHOS 132 10/29/2023 08:42 PM    AST 39 10/29/2023 08:42 PM    ALT 43 10/29/2023 08:42 PM     Lab Results   Component Value Date/Time    PROTIME 27.2 05/08/2023 10:25 AM    INR 2.3 05/08/2023 10:25 AM     No results for input(s): \"CKTOTAL\", \"CKMB\", \"CKMBINDEX\", \"TROPONINI\" in the last 72 hours.   Lab Results   Component Value Date/Time    NITRU NEGATIVE 10/29/2023 08:37 PM    COLORU Yellow 10/29/2023 08:37 PM    PHUR 5.5 10/29/2023 08:37 PM    WBCUA 6 TO 9 10/29/2023 08:37 PM    WBCUA 10-20 07/29/2011 01:50 PM    RBCUA 0 TO 2 10/29/2023 08:37 PM    RBCUA 0-1 12/27/2013 11:10 AM    MUCUS PRESENT 10/29/2023 08:37 PM    BACTERIA 3+ 10/29/2023 08:37 PM    CLARITYU Clear

## 2023-10-30 NOTE — ED NOTES
Attempted to call Fátima Ware. No answer, voicemail was left instructing her to call ED back.      Kathe Scott  10/29/23 3670

## 2023-10-30 NOTE — PLAN OF CARE
ASSESSMENT/PLAN:   ?Chronic obstructive pulmonary disease with acute exacerbation (hcc)  (primary encounter diagnosis)  Take Medrol Dosepak as prescribed with food. Start zpack as prescribed sent to pharmacy  Use fluticasone -salmeterol inhaler 2 puffs 2 times daily. Rinse mouth with water and spit out after each use. Use albuterol inhaler every 4-6 hours as needed.  -complete course of antibiotics as prescribed-not completing course of antibiotics may result in infection not fully eradicated or may lead to antibiotic resistance  -eat probiotic yogurt (Activia)  or take probiotic capsules i.e Align or Florastor (sprinkles are available for children)  -take antibiotics on full stomach unless noted otherwise   -Call or return to office if symptoms are not markedly improved within 3-4 days or if symptoms are worsening      No orders of the defined types were placed in this encounter. Follow-up with your primary care in 1 week or sooner if needed. Meds This Visit:  Requested Prescriptions     Signed Prescriptions Disp Refills    azithromycin (ZITHROMAX Z-PRAVIN) 250 MG Oral Tab 6 tablet 0     Sig: Take 2 tablets (500 mg total) by mouth daily for 1 day, THEN 1 tablet (250 mg total) daily for 4 days. methylPREDNISolone (MEDROL) 4 MG Oral Tablet Therapy Pack 21 each 0     Sig: As directed.        Imaging & Referrals:  None Problem: Safety - Adult  Goal: Free from fall injury  10/30/2023 1057 by Romi Giron RN  Outcome: Progressing  10/30/2023 0615 by Angela Ross RN  Outcome: Progressing     Problem: ABCDS Injury Assessment  Goal: Absence of physical injury  10/30/2023 1057 by Romi Giron RN  Outcome: Progressing  10/30/2023 0615 by Angela Ross RN  Outcome: Progressing

## 2023-10-30 NOTE — PROGRESS NOTES
Report received from nightshift RN. Patient awake and alert in bed, oriented x4, very pleasant, with no complaints of pain or discomfort at this time. Vital signs reviewed. Labs and orders reviewed. Bed alarm on and active for safety. Patient ambulating to bathroom with SBA. Will assume care of patient at this time.

## 2023-10-30 NOTE — ED NOTES
Pt is agitated and attempting to refuse EKG. Food provided to attempt to deescalate pt.      Suleman Linton, [place]  10/29/23 3362
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
## 2023-10-30 NOTE — CONSULTS
PSYCHIATRY CONSULT    REASON FOR CONSULT: \"Behavior disturbance. \"    REQUESTING PROVIDER: Dr. Morton Lamp: \"I'm hungry. \"    HISTORY OF PRESENT ILLNESS:  Chaya Mora  is a 82 y.o. female with a past medical history of dementia, hypertension, hyperlipidemia, CVA, MDD, and TIA. Patient was admitted on 10/29 with AMS. Patient was reportedly agitated at home prior to arrival to the ED. Family reported that patient has been wandering, more difficult to redirect, and increasingly confused. Chart reviewed. Home psychotropics include Effexor 150 mg daily. UDS not performed. . Psychiatry consulted for dementia with behavioral disturbance. Spoke with primary RN who reports patient pleasantly confused, but has noticed possible sundowning as the day progresses. No behavioral outbursts so far this shift. Upon assessment patient observed resting in bed, she awakens when I call her name. Patient initially irritable during assessment but progressively became more cooperative. Patient confused, alert to self only. Limited history obtained because patient is a poor historian, no family present at bedside. Patient tearful and reports feeling unsupported by her family. She is upset with her granddaughter who lives with her. Patient attributes feelings of depression to being lonely and isolated. She reports residing at an assisted living facility in the past and would like to go back. Per patient, \"I got to talk to people, do crafts, and enjoyed myself there. \" Patient is not suicidal, homicidal, psychotic, or manic. Reports sleep is poor since being hospitalized. Appetite is adequate. OARRS: Reviewed. 000.     PAST PSYCHIATRIC HISTORY: TOMAS due to patient's level of confusion    PAST MEDICAL HISTORY:       Diagnosis Date    Arthritis     Cerebral artery occlusion with cerebral infarction (HCC)     Depression     DVT (deep vein thrombosis) in pregnancy     DVT of lower extremity, bilateral (HCC)     GERD

## 2023-10-30 NOTE — PROGRESS NOTES
Physical Therapy  Physical Therapy Initial Evaluation/Plan of Care    Room #:  9753/6207-35  Patient Name: Bonita Robertson  YOB: 1940  MRN: [id number]    Date of Service: 10/30/2023     Tentative placement recommendation: Home    Equipment recommendation: Patient has needed equipment       Evaluating Physical Therapist: Tejas Desouza PT, DPT #[id number]      Specific Provider Orders/Date/Referring Provider :     10/30/23 0015    PT eval and treat  Start:  10/30/23 0015,   End:  10/30/23 0015,   ONE TIME,   Standing Count:  1 Occurrences,   R         Ulices Koo MD Acknowledge New     Admitting Diagnosis:   Altered mental status [R41.82]  Altered mental status, unspecified altered mental status type [R41.82]  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 ([address]) [G30.1, F02.818]      Surgery: none  Visit Diagnoses         Codes    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 ([address])    -  Primary G30.1, F02.818            Patient Active Problem List   Diagnosis    Hypertension    History of DVT (deep vein thrombosis)    Cerebrovascular accident (CVA) ([address])    Hyperlipidemia    Mild vascular dementia without behavioral disturbance, psychotic disturbance, mood disturbance, or anxiety ([address])    Hypokalemia    Dehydration with hypernatremia    Confusion    Episode of recurrent major depressive disorder ([address])    Unable to care for self    Overactive bladder    TIA (transient ischemic attack)    Syncope    Moderate protein-calorie malnutrition ([address])    Altered mental status        ASSESSMENT of Current Deficits Patient exhibits decreased strength, balance, and endurance impairing functional mobility, transfers, gait , gait distance, and tolerance to activity. Pt mildly unsteady with ambulation with no LOB, dizziness, or SOB during session. Pt agreeable to seated exercises following function with VC for technique.         PHYSICAL THERAPY  PLAN OF CARE       Physical therapy plan of

## 2023-10-31 PROCEDURE — 6370000000 HC RX 637 (ALT 250 FOR IP): Performed by: INTERNAL MEDICINE

## 2023-10-31 PROCEDURE — 6370000000 HC RX 637 (ALT 250 FOR IP)

## 2023-10-31 PROCEDURE — 1200000000 HC SEMI PRIVATE

## 2023-10-31 RX ORDER — TROSPIUM CHLORIDE 20 MG/1
20 TABLET, FILM COATED ORAL
Status: DISCONTINUED | OUTPATIENT
Start: 2023-10-31 | End: 2023-11-01 | Stop reason: HOSPADM

## 2023-10-31 RX ADMIN — VENLAFAXINE HYDROCHLORIDE 150 MG: 150 CAPSULE, EXTENDED RELEASE ORAL at 10:44

## 2023-10-31 RX ADMIN — Medication 5 MG: at 19:20

## 2023-10-31 RX ADMIN — TROSPIUM CHLORIDE 20 MG: 20 TABLET, FILM COATED ORAL at 19:20

## 2023-10-31 RX ADMIN — ATORVASTATIN CALCIUM 40 MG: 40 TABLET, FILM COATED ORAL at 22:06

## 2023-10-31 RX ADMIN — RIVAROXABAN 20 MG: 20 TABLET, FILM COATED ORAL at 19:20

## 2023-10-31 NOTE — PROGRESS NOTES
PS message sent to Dr. Crystal Hicks. Daughter, Shaw Locke, is requesting to speak with him regarding her mother.

## 2023-10-31 NOTE — CARE COORDINATION
Ss note: 10/31/2023 9:21 AM SW received call from pts dtr/samy [facility] [employer] [phone]. Discussed that pt is doing well with therapy and pt will NOT qualify for skilled rehab stay under her [facility]. Dtr voiced understanding; dtr informed that  pt would need to be private pay at a nursing home for placement and family is not able to afford this. Psychiatry was consulted yesterday and pts dtr is requesting a call from psychiatry. Dtr reports pt has 24/7 supervision at home from family and  dtr does not feel pt will benefit from any [address] at discharge. SW informed charge nurse that dtr Jesus Alberto Peter is requesting call from psych, dtr relays she will place a follow up call to Dr. Declan Faustin office afterwards. At this time pt is not a candidate for nursing home placement. Plan will be for pt to return home and dtr relays family will transport. Charge nurse updated.  Martina Pineda, CHARLES

## 2023-11-01 VITALS
DIASTOLIC BLOOD PRESSURE: 68 MMHG | WEIGHT: 125 LBS | TEMPERATURE: 98 F | RESPIRATION RATE: 18 BRPM | BODY MASS INDEX: 24.41 KG/M2 | SYSTOLIC BLOOD PRESSURE: 112 MMHG | OXYGEN SATURATION: 99 % | HEART RATE: 85 BPM

## 2023-11-01 PROCEDURE — 6370000000 HC RX 637 (ALT 250 FOR IP): Performed by: INTERNAL MEDICINE

## 2023-11-01 PROCEDURE — 97530 THERAPEUTIC ACTIVITIES: CPT | Performed by: PHYSICAL THERAPIST

## 2023-11-01 RX ORDER — MECOBALAMIN 5000 MCG
5 TABLET,DISINTEGRATING ORAL EVERY EVENING
DISCHARGE
Start: 2023-11-01

## 2023-11-01 RX ADMIN — TROSPIUM CHLORIDE 20 MG: 20 TABLET, FILM COATED ORAL at 05:21

## 2023-11-01 RX ADMIN — VENLAFAXINE HYDROCHLORIDE 150 MG: 150 CAPSULE, EXTENDED RELEASE ORAL at 08:06

## 2023-11-01 NOTE — PLAN OF CARE
Problem: Safety - Adult  Goal: Free from fall injury  Outcome: Progressing     Problem: ABCDS Injury Assessment  Goal: Absence of physical injury  Outcome: Progressing     Problem: Chronic Conditions and Co-morbidities  Goal: Patient's chronic conditions and co-morbidity symptoms are monitored and maintained or improved  Outcome: Progressing     Problem: Skin/Tissue Integrity  Goal: Absence of new skin breakdown  Description: 1. Monitor for areas of redness and/or skin breakdown  2. Assess vascular access sites hourly  3. Every 4-6 hours minimum:  Change oxygen saturation probe site  4. Every 4-6 hours:  If on nasal continuous positive airway pressure, respiratory therapy assess nares and determine need for appliance change or resting period.   Outcome: Progressing

## 2023-11-01 NOTE — PROGRESS NOTES
Physician Progress Note      PATIENTLetha File  CSN #:                  [id number]  :                       1940  ADMIT DATE:       10/29/2023 7:31 PM  [address] [facility] DATE:  RESPONDING  PROVIDER #:        Rock Sid NEWMAN          QUERY TEXT:    Dear ,    Patient admitted with worsening dementia per family. Noted documentation of   Mild vascular dementia without behavioral disturbance, psychotic disturbance,   mood disturbance, or anxiety per IM H&P done 10/30 and Dementia with   Behavioral Disturbance by psych consult note 10/30. If possible, please   document in progress notes and discharge summary if you are evaluating and /or   treating any of the following: The medical record reflects the following:  Risk Factors: vascular dementia, HTN, HLD, GERD  Clinical Indicators: per H&P: \"Agitation (Patient has been aggressive with   family. Recently diagnosed with vascular dementia. Patient calm and   cooperative. )\"; per pn psych 10/31: \" Patient pleasantly confused without any   behavioral outbursts since admission. \"  Treatment: telemetry monitoring, Psych consult    Thank You,  Jeannette Anderson RN, BSN, CDS  Clinical Documentation Improvement Specialist  Options provided:  -- Vascular Dementia without behavioral disturbance  -- Vascular Dementia with behavioral disturbance  -- Other - I will add my own diagnosis  -- Disagree - Not applicable / Not valid  -- Disagree - Clinically unable to determine / Unknown  -- Refer to Clinical Documentation Reviewer    PROVIDER RESPONSE TEXT:    This patient has vascular dementia with behavioral disturbances.     Query created by: Jeannette Anderson on 2023 9:50 AM      Electronically signed by:  Rock Sid NEWMAN 2023 12:15 PM

## 2023-11-01 NOTE — CARE COORDINATION
Ss note: 11/1/2023 9:06 AM Per chart review and call from dtr ANASTASIA Edwards this am, family is requesting a referral to [facility] [place] for placement private pay. SW made referral to [facility] to review, will await return call for acceptance. Per charting pt is medically stable for discharge.  CHARLES Ventura

## 2023-11-01 NOTE — DISCHARGE INSTR - COC
Concerns: At Risk for Falls    Impairments/Disabilities:      None    Nutrition Therapy:  Current Nutrition Therapy:   - Oral Diet:  General    Routes of Feeding: Oral  Liquids: Thin Liquids  Daily Fluid Restriction: no  Last Modified Barium Swallow with Video (Video Swallowing Test): not done    Treatments at the Time of Hospital Discharge:   Respiratory Treatments: NA  Oxygen Therapy:  is not on home oxygen therapy. Ventilator:    - No ventilator support    Rehab Therapies: Physical Therapy  Weight Bearing Status/Restrictions: No weight bearing restrictions  Other Medical Equipment (for information only, NOT a DME order):  walker  Other Treatments: NA    Patient's personal belongings (please select all that are sent with patient):  None, Dentures upper, Jewelry    RN SIGNATURE:  Electronically signed by Tato Torres RN on 11/1/23 at 11:33 AM EDT    CASE MANAGEMENT/SOCIAL WORK SECTION    Inpatient Status Date: ***    Readmission Risk Assessment Score:  Readmission Risk              Risk of Unplanned Readmission:  12           Discharging to Facility/ Agency   Name:  [facility]   Address: [address] [place] [address]  Phone: [phone]  Fax: [phone]    Dialysis Facility (if applicable)   Name:  Address:  Dialysis Schedule:  Phone:  Fax:    / signature: Electronically signed by CHARLES Magdaleno on 11/1/23 at 10:51 AM EDT    PHYSICIAN SECTION    Prognosis: {Prognosis:2044143857}    Condition at Discharge: [address] Patient Condition:[id number]}    Rehab Potential (if transferring to Rehab): {Prognosis:6496456496}    Recommended Labs or Other Treatments After Discharge: ***    Physician Certification: I certify the above information and transfer of Vanessa Brody  is necessary for the continuing treatment of the diagnosis listed and that she requires {Admit to Appropriate Level of Care:61407} for {GREATER/LESS:471960276} 30 days.      Update Admission H&P: {CHP DME Changes

## 2023-11-01 NOTE — CARE COORDINATION
Ss note: 11/1/2023. 12:14 PM Per charge nurse Dr. Salomon Montoya to discharge pt today. Plan is [facility]. Facility will transport pt at 4 pm today. Facility liaison, nursing, pt and dtr notified. Will need dc order to complete Hens, need signed DEREJE and nurse to nurse called. CHARLES Perez    Ss note: 11/1/2023  10:44 AM SW notified by greer Lawson with [facility] at [place], pt has been accepted private pay and can admit to facility today. Charge nurse aware and notified physician. Will await discharge order. Dtr relays she prefers that facility transport. SW will arrange once discharge order in and update family on time. Will need signed DEREJE.  CHARLES Perez

## 2023-11-01 NOTE — PROGRESS NOTES
Physical Therapy  Physical Therapy Treatment Note/Plan of Care    Room #:  4676/3575-77  Patient Name: Ulises Vallejo  YOB: 1940  MRN: [id number]    Date of Service: 11/1/2023     Tentative placement recommendation: Home    Equipment recommendation: Patient has needed equipment       Evaluating Physical Therapist: Krzysztof Yang PT, DPT #[id number]      Specific Provider Orders/Date/Referring Provider :     10/30/23 0015    PT eval and treat  Start:  10/30/23 0015,   End:  10/30/23 0015,   ONE TIME,   Standing Count:  1 Occurrences,   R         Bharati Burden MD Acknowledge New     Admitting Diagnosis:   Altered mental status [R41.82]  Altered mental status, unspecified altered mental status type [R41.82]  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 ([address]) [G30.1, F02.818]      Surgery: none  Visit Diagnoses         Codes    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 ([address])    -  Primary G30.1, F02.818            Patient Active Problem List   Diagnosis    Hypertension    History of DVT (deep vein thrombosis)    Cerebrovascular accident (CVA) ([address])    Hyperlipidemia    Mild vascular dementia without behavioral disturbance, psychotic disturbance, mood disturbance, or anxiety ([address])    Hypokalemia    Dehydration with hypernatremia    Confusion    Episode of recurrent major depressive disorder ([address])    Unable to care for self    Overactive bladder    TIA (transient ischemic attack)    Syncope    Moderate protein-calorie malnutrition ([address])    Altered mental status    Dementia with behavioral disturbance ([address])        ASSESSMENT of Current Deficits Patient exhibits decreased strength, balance, and endurance impairing functional mobility, transfers, gait , gait distance, and tolerance to activity. Pt mildly unsteady with ambulation with no LOB, dizziness, or SOB during session.  Pt required encouragement to perform stairs and was able to with mild-moderate instability with 1 HR and

## 2023-11-24 LAB
BILIRUB UR QL STRIP: NEGATIVE
CASTS #/AREA URNS LPF: ABNORMAL /LPF
CLARITY UR: CLEAR
COLOR UR: YELLOW
EPI CELLS #/AREA URNS HPF: ABNORMAL /HPF
GLUCOSE UR STRIP-MCNC: NEGATIVE MG/DL
HGB UR QL STRIP.AUTO: NEGATIVE
KETONES UR STRIP-MCNC: NEGATIVE MG/DL
LEUKOCYTE ESTERASE UR QL STRIP: NEGATIVE
NITRITE UR QL STRIP: NEGATIVE
PH UR STRIP: 5.5 [PH] (ref 5–9)
PROT UR STRIP-MCNC: NEGATIVE MG/DL
RBC #/AREA URNS HPF: ABNORMAL /HPF
SP GR UR STRIP: 1.02 (ref 1–1.03)
UROBILINOGEN UR STRIP-ACNC: 0.2 EU/DL (ref 0–1)
WBC #/AREA URNS HPF: ABNORMAL /HPF

## 2023-11-25 LAB
MICROORGANISM SPEC CULT: NO GROWTH
SPECIMEN DESCRIPTION: NORMAL

## 2023-12-01 ENCOUNTER — TRANSCRIBE ORDERS (OUTPATIENT)
Age: 83
End: 2023-12-01

## 2023-12-01 DIAGNOSIS — I51.7 CARDIOMEGALY: Primary | ICD-10-CM

## 2023-12-09 ENCOUNTER — APPOINTMENT (OUTPATIENT)
Dept: RADIOLOGY | Facility: HOSPITAL | Age: 83
End: 2023-12-09
Payer: MEDICARE

## 2023-12-09 ENCOUNTER — HOSPITAL ENCOUNTER (EMERGENCY)
Facility: HOSPITAL | Age: 83
Discharge: HOME | End: 2023-12-09
Attending: STUDENT IN AN ORGANIZED HEALTH CARE EDUCATION/TRAINING PROGRAM
Payer: MEDICARE

## 2023-12-09 VITALS
HEIGHT: 61 IN | OXYGEN SATURATION: 96 % | TEMPERATURE: 96.8 F | HEART RATE: 88 BPM | RESPIRATION RATE: 18 BRPM | BODY MASS INDEX: 26.43 KG/M2 | DIASTOLIC BLOOD PRESSURE: 90 MMHG | WEIGHT: 140 LBS | SYSTOLIC BLOOD PRESSURE: 111 MMHG

## 2023-12-09 DIAGNOSIS — M25.552 ACUTE HIP PAIN, LEFT: ICD-10-CM
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
## 2023-12-09 LAB
ANION GAP SERPL CALC-SCNC: 12 MMOL/L (ref 10–20)
APTT PPP: 38 SECONDS (ref 27–38)
BASOPHILS # BLD AUTO: 0.01 X10*3/UL (ref 0–0.1)
BASOPHILS NFR BLD AUTO: 0.1 %
BUN SERPL-MCNC: 15 MG/DL (ref 6–23)
CALCIUM SERPL-MCNC: 8.5 MG/DL (ref 8.6–10.3)
CHLORIDE SERPL-SCNC: 103 MMOL/L (ref 98–107)
CO2 SERPL-SCNC: 29 MMOL/L (ref 21–32)
CREAT SERPL-MCNC: 0.73 MG/DL (ref 0.5–1.05)
EOSINOPHIL # BLD AUTO: 0.03 X10*3/UL (ref 0–0.4)
EOSINOPHIL NFR BLD AUTO: 0.4 %
ERYTHROCYTE [DISTWIDTH] IN BLOOD BY AUTOMATED COUNT: 14.9 % (ref 11.5–14.5)
GFR SERPL CREATININE-BSD FRML MDRD: 83 ML/MIN/1.73M*2
GLUCOSE SERPL-MCNC: 90 MG/DL (ref 74–99)
HCT VFR BLD AUTO: 41.1 % (ref 36–46)
HGB BLD-MCNC: 13.3 G/DL (ref 12–16)
HOLD SPECIMEN: NORMAL
HOLD SPECIMEN: NORMAL
IMM GRANULOCYTES # BLD AUTO: 0.02 X10*3/UL (ref 0–0.5)
IMM GRANULOCYTES NFR BLD AUTO: 0.3 % (ref 0–0.9)
INR PPP: 1.8 (ref 0.9–1.1)
LYMPHOCYTES # BLD AUTO: 1.13 X10*3/UL (ref 0.8–3)
LYMPHOCYTES NFR BLD AUTO: 14.5 %
MCH RBC QN AUTO: 31.7 PG (ref 26–34)
MCHC RBC AUTO-ENTMCNC: 32.4 G/DL (ref 32–36)
MCV RBC AUTO: 98 FL (ref 80–100)
MONOCYTES # BLD AUTO: 0.45 X10*3/UL (ref 0.05–0.8)
MONOCYTES NFR BLD AUTO: 5.8 %
NEUTROPHILS # BLD AUTO: 6.18 X10*3/UL (ref 1.6–5.5)
NEUTROPHILS NFR BLD AUTO: 78.9 %
NRBC BLD-RTO: 0 /100 WBCS (ref 0–0)
PLATELET # BLD AUTO: 172 X10*3/UL (ref 150–450)
POTASSIUM SERPL-SCNC: 3.6 MMOL/L (ref 3.5–5.3)
PROTHROMBIN TIME: 20.6 SECONDS (ref 9.8–12.8)
RBC # BLD AUTO: 4.19 X10*6/UL (ref 4–5.2)
SODIUM SERPL-SCNC: 140 MMOL/L (ref 136–145)
WBC # BLD AUTO: 7.8 X10*3/UL (ref 4.4–11.3)

## 2023-12-09 PROCEDURE — 99284 EMERGENCY DEPT VISIT MOD MDM: CPT | Mod: 25 | Performed by: STUDENT IN AN ORGANIZED HEALTH CARE EDUCATION/TRAINING PROGRAM

## 2023-12-09 PROCEDURE — 80048 BASIC METABOLIC PNL TOTAL CA: CPT | Performed by: STUDENT IN AN ORGANIZED HEALTH CARE EDUCATION/TRAINING PROGRAM

## 2023-12-09 PROCEDURE — 85610 PROTHROMBIN TIME: CPT | Performed by: STUDENT IN AN ORGANIZED HEALTH CARE EDUCATION/TRAINING PROGRAM

## 2023-12-09 PROCEDURE — 73502 X-RAY EXAM HIP UNI 2-3 VIEWS: CPT | Mod: LT,FR

## 2023-12-09 PROCEDURE — 70450 CT HEAD/BRAIN W/O DYE: CPT

## 2023-12-09 PROCEDURE — 36415 COLL VENOUS BLD VENIPUNCTURE: CPT | Performed by: STUDENT IN AN ORGANIZED HEALTH CARE EDUCATION/TRAINING PROGRAM

## 2023-12-09 PROCEDURE — 85730 THROMBOPLASTIN TIME PARTIAL: CPT | Performed by: STUDENT IN AN ORGANIZED HEALTH CARE EDUCATION/TRAINING PROGRAM

## 2023-12-09 PROCEDURE — 85025 COMPLETE CBC W/AUTO DIFF WBC: CPT | Performed by: STUDENT IN AN ORGANIZED HEALTH CARE EDUCATION/TRAINING PROGRAM

## 2023-12-09 PROCEDURE — 70450 CT HEAD/BRAIN W/O DYE: CPT | Performed by: RADIOLOGY

## 2023-12-09 PROCEDURE — 73502 X-RAY EXAM HIP UNI 2-3 VIEWS: CPT | Mod: LEFT SIDE | Performed by: RADIOLOGY

## 2023-12-09 ASSESSMENT — PAIN - FUNCTIONAL ASSESSMENT: PAIN_FUNCTIONAL_ASSESSMENT: 0-10

## 2023-12-09 ASSESSMENT — PAIN DESCRIPTION - LOCATION: LOCATION: HIP

## 2023-12-09 ASSESSMENT — COLUMBIA-SUICIDE SEVERITY RATING SCALE - C-SSRS
2. HAVE YOU ACTUALLY HAD ANY THOUGHTS OF KILLING YOURSELF?: NO
6. HAVE YOU EVER DONE ANYTHING, STARTED TO DO ANYTHING, OR PREPARED TO DO ANYTHING TO END YOUR LIFE?: NO
1. IN THE PAST MONTH, HAVE YOU WISHED YOU WERE DEAD OR WISHED YOU COULD GO TO SLEEP AND NOT WAKE UP?: NO

## 2023-12-09 ASSESSMENT — PAIN DESCRIPTION - ORIENTATION: ORIENTATION: LEFT

## 2023-12-09 ASSESSMENT — PAIN DESCRIPTION - PAIN TYPE: TYPE: ACUTE PAIN

## 2023-12-09 ASSESSMENT — PAIN SCALES - GENERAL: PAINLEVEL_OUTOF10: 10 - WORST POSSIBLE PAIN

## 2023-12-09 NOTE — ED PROVIDER NOTES
HPI   Chief Complaint   Patient presents with    Fall     Left hip pain, fall from chair, denies LOC, denies hitting her head       This is a 83-year-old female presenting for evaluation of left hip pain after fall that occurred last night.  Patient lives at home with daughter who is present at bedside.  Normally ambulates with a rollator for assistance.  History of dementia, DVT on Xarelto.  Patient reports that she had immediate pain in her left groin that is worse with bearing weight.  Denies directly hitting her head on the fall.  No loss of consciousness.  Is able to put a little bit of weight on her left lower extremity but this is limited due to severe pain.  Has only mild pain at rest.      History provided by:  Patient and relative                      No data recorded                Patient History   Past Medical History:   Diagnosis Date    Dementia (CMS/HCC)      Past Surgical History:   Procedure Laterality Date    CT ANGIO NECK  5/8/2023    CT ANGIO NECK 5/8/2023    CT HEAD ANGIO W AND WO IV CONTRAST  5/8/2023    CT HEAD ANGIO W AND WO IV CONTRAST 5/8/2023     No family history on file.  Social History     Tobacco Use    Smoking status: Not on file    Smokeless tobacco: Not on file   Substance Use Topics    Alcohol use: Not on file    Drug use: Not on file       Physical Exam   ED Triage Vitals [12/09/23 1502]   Temp Heart Rate Resp BP   36 °C (96.8 °F) 88 18 111/90      SpO2 Temp Source Heart Rate Source Patient Position   96 % Tympanic Monitor --      BP Location FiO2 (%)     -- --       Physical Exam  Vitals and nursing note reviewed.   Constitutional:       General: She is not in acute distress.  HENT:      Head: Atraumatic.      Mouth/Throat:      Mouth: Mucous membranes are moist.      Pharynx: Oropharynx is clear.   Eyes:      Extraocular Movements: Extraocular movements intact.      Conjunctiva/sclera: Conjunctivae normal.      Pupils: Pupils are equal, round, and reactive to light.    Cardiovascular:      Rate and Rhythm: Normal rate and regular rhythm.      Pulses: Normal pulses.   Pulmonary:      Effort: Pulmonary effort is normal. No respiratory distress.      Breath sounds: Normal breath sounds.   Abdominal:      General: There is no distension.      Palpations: Abdomen is soft.      Tenderness: There is no abdominal tenderness.   Musculoskeletal:         General: No deformity.      Cervical back: Neck supple.      Comments: Left lower extremity is neurovascularly intact, mild pain with internal/external rotation of the left hip   Skin:     General: Skin is warm and dry.   Neurological:      Mental Status: She is alert. Mental status is at baseline.      Cranial Nerves: No cranial nerve deficit.      Sensory: No sensory deficit.      Motor: No weakness.   Psychiatric:         Mood and Affect: Mood normal.         Behavior: Behavior normal.         ED Course & MDM   Diagnoses as of 12/09/23 1809   Fall, initial encounter   Acute hip pain, left     Labs Reviewed   CBC WITH AUTO DIFFERENTIAL - Abnormal       Result Value    WBC 7.8      nRBC 0.0      RBC 4.19      Hemoglobin 13.3      Hematocrit 41.1      MCV 98      MCH 31.7      MCHC 32.4      RDW 14.9 (*)     Platelets 172      Neutrophils % 78.9      Immature Granulocytes %, Automated 0.3      Lymphocytes % 14.5      Monocytes % 5.8      Eosinophils % 0.4      Basophils % 0.1      Neutrophils Absolute 6.18 (*)     Immature Granulocytes Absolute, Automated 0.02      Lymphocytes Absolute 1.13      Monocytes Absolute 0.45      Eosinophils Absolute 0.03      Basophils Absolute 0.01     BASIC METABOLIC PANEL - Abnormal    Glucose 90      Sodium 140      Potassium 3.6      Chloride 103      Bicarbonate 29      Anion Gap 12      Urea Nitrogen 15      Creatinine 0.73      eGFR 83      Calcium 8.5 (*)    PROTIME-INR - Abnormal    Protime 20.6 (*)     INR 1.8 (*)    APTT - Normal    aPTT 38      Narrative:     The APTT is no longer used for  monitoring Unfractionated Heparin Therapy. For monitoring Heparin Therapy, use the Heparin Assay.   GRAY TOP    Extra Tube Hold for add-ons.       CT head wo IV contrast   Final Result   Mild-to-moderate age related degenerative change as described without   acute findings.        Signed by: Cedrick Del Rio 12/9/2023 3:44 PM   Dictation workstation:   [id number]      XR hip left with pelvis when performed 2 or 3 views   Final Result   No acute osseous abnormality.   Signed by Hossein Alejandro MD          Medical Decision Making  This is an 83-year-old female presenting with left hip pain after fall that occurred yesterday.  Patient is currently anticoagulated.  Denies head trauma or LOC.  On exam patient without obvious deformity, no shortening or rotation of the left lower extremity.  Neurovascularly intact.  Some mild pain with internal/external rotation of the left hip.  Plain films of the left hip and pelvis were obtained as well as CT imaging of the head given patient's anticoagulation status.  Imaging reviewed.  CT head is negative for intracranial hemorrhage or other acute findings.  X-rays of the left hip do not demonstrate acute fracture or dislocation.  Discussed continued Tylenol for analgesia and follow-up with orthopedics if symptoms persist.    Amount and/or Complexity of Data Reviewed  Labs: ordered. Decision-making details documented in ED Course.     Details: Lab work reviewed and is overall unremarkable.  Radiology: ordered. Decision-making details documented in ED Course.        Procedure  Procedures     Moisés Pichardo MD  12/09/23 5739

## 2024-03-28 ENCOUNTER — APPOINTMENT (OUTPATIENT)
Dept: GENERAL RADIOLOGY | Age: 84
End: 2024-03-28
Payer: MEDICARE

## 2024-03-28 ENCOUNTER — APPOINTMENT (OUTPATIENT)
Dept: CT IMAGING | Age: 84
End: 2024-03-28
Payer: MEDICARE

## 2024-03-28 ENCOUNTER — HOSPITAL ENCOUNTER (EMERGENCY)
Age: 84
Discharge: HOME | End: 2024-03-28
Payer: MEDICARE

## 2024-03-28 VITALS
DIASTOLIC BLOOD PRESSURE: 74 MMHG | HEART RATE: 69 BPM | SYSTOLIC BLOOD PRESSURE: 152 MMHG | OXYGEN SATURATION: 100 % | TEMPERATURE: 97.1 F | RESPIRATION RATE: 20 BRPM

## 2024-03-28 DIAGNOSIS — M25.562 LEFT KNEE PAIN, UNSPECIFIED CHRONICITY: ICD-10-CM

## 2024-03-28 DIAGNOSIS — S00.03XA HEMATOMA OF SCALP, INITIAL ENCOUNTER: ICD-10-CM
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
## 2024-03-28 PROCEDURE — 70450 CT HEAD/BRAIN W/O DYE: CPT

## 2024-03-28 PROCEDURE — 73560 X-RAY EXAM OF KNEE 1 OR 2: CPT

## 2024-03-28 PROCEDURE — 6370000000 HC RX 637 (ALT 250 FOR IP): Performed by: PHYSICIAN ASSISTANT

## 2024-03-28 PROCEDURE — 72125 CT NECK SPINE W/O DYE: CPT

## 2024-03-28 PROCEDURE — 99284 EMERGENCY DEPT VISIT MOD MDM: CPT

## 2024-03-28 RX ORDER — ACETAMINOPHEN 500 MG
1000 TABLET ORAL ONCE
Status: COMPLETED | OUTPATIENT
Start: 2024-03-28 | End: 2024-03-28

## 2024-03-28 RX ADMIN — ACETAMINOPHEN 1000 MG: 500 TABLET ORAL at 20:47

## 2024-03-28 ASSESSMENT — PAIN SCALES - GENERAL: PAINLEVEL_OUTOF10: 10

## 2024-03-28 NOTE — ED PROVIDER NOTES
Independent [facility] Visit.    HPI:  3/28/24,   Time: 7:29 PM EDT         Maureen Collier is a 83 y.o. female w/ pmhx of CVA, dementia, hyperlipidemia, depression and arthritis presenting to the ED EMS from the nursing home for a fall.  Patient is alert and oriented x 3.  She states she was in bed and tried to reach over to her nightstand to grab something when she accidentally rolled out and fell out of the bed.  She did hit her head and left knee.  She is on blood thinners.  No loss of consciousness.  There is a small hematoma on her head.  Does report a dull headache.  On initial exam no obvious trauma.  Good distal pulses and able to move her extremities.  Mental status is at baseline.  Denies fever, chills, body aches, dizziness, syncope, vision changes, chest pain, shortness of breath, abdominal pain, nausea, vomiting or diarrhea.  ROS:   Pertinent positives and negatives are stated within HPI, all other systems reviewed and are negative.  --------------------------------------------- PAST HISTORY ---------------------------------------------  Past Medical History:  has a past medical history of Arthritis, Cerebral artery occlusion with cerebral infarction (HCC), Depression, DVT (deep vein thrombosis) in pregnancy, DVT of lower extremity, bilateral (HCC), GERD (gastroesophageal reflux disease), Hx of blood clots, Hyperlipidemia, Hypertension, and Nephrolithiasis.    Past Surgical History:  has a past surgical history that includes Gastric bypass surgery (March, 2006); Hysterectomy (42 years ago); bladder suspension (2011); Abdomen surgery (2007, 2008); Foot surgery; Cholecystectomy, open (3/21/2013); Endoscopy, colon, diagnostic; Colonoscopy; and pr arthrp kne condyle&platu medial&lat compartments (N/A, 7/31/2018).    Social History:  reports that she has never smoked. She has never used smokeless tobacco. She reports that she does not drink alcohol and does not use drugs.    Family History: family history  they are agreeable with the plan.      --------------------------------- IMPRESSION AND DISPOSITION ---------------------------------    IMPRESSION  1. Fall, initial encounter    2. Hematoma of scalp, initial encounter    3. Left knee pain, unspecified chronicity        DISPOSITION  Disposition: Discharge to home  Patient condition is good                 Ana Guerrier PA-C  03/30/24 1349

## 2024-03-29 NOTE — ED NOTES
Pt's daughter given d/c instructions and was able to verbalize understanding. Pt taken out in wheelchair at this time.

## 2024-07-20 ENCOUNTER — APPOINTMENT (OUTPATIENT)
Dept: CT IMAGING | Age: 84
End: 2024-07-20
Payer: MEDICARE

## 2024-07-20 ENCOUNTER — APPOINTMENT (OUTPATIENT)
Dept: GENERAL RADIOLOGY | Age: 84
End: 2024-07-20
Payer: MEDICARE

## 2024-07-20 ENCOUNTER — HOSPITAL ENCOUNTER (EMERGENCY)
Age: 84
Discharge: HOME | End: 2024-07-20
Attending: EMERGENCY MEDICINE
Payer: MEDICARE

## 2024-07-20 VITALS
HEART RATE: 77 BPM | RESPIRATION RATE: 20 BRPM | OXYGEN SATURATION: 96 % | DIASTOLIC BLOOD PRESSURE: 61 MMHG | SYSTOLIC BLOOD PRESSURE: 108 MMHG | TEMPERATURE: 98.5 F | WEIGHT: 125 LBS | BODY MASS INDEX: 24.41 KG/M2

## 2024-07-20 DIAGNOSIS — S30.1XXA ABDOMINAL WALL HEMATOMA, INITIAL ENCOUNTER: ICD-10-CM
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
## 2024-07-20 PROCEDURE — 70450 CT HEAD/BRAIN W/O DYE: CPT

## 2024-07-20 PROCEDURE — 99284 EMERGENCY DEPT VISIT MOD MDM: CPT

## 2024-07-20 PROCEDURE — 72170 X-RAY EXAM OF PELVIS: CPT

## 2024-07-20 PROCEDURE — 72125 CT NECK SPINE W/O DYE: CPT

## 2024-07-20 PROCEDURE — 72192 CT PELVIS W/O DYE: CPT

## 2024-07-20 PROCEDURE — 71045 X-RAY EXAM CHEST 1 VIEW: CPT

## 2024-07-20 RX ORDER — ACETAMINOPHEN 500 MG
1000 TABLET ORAL ONCE
Status: DISCONTINUED | OUTPATIENT
Start: 2024-07-20 | End: 2024-07-21 | Stop reason: HOSPADM

## 2024-07-20 NOTE — ED PROVIDER NOTES
old, though they have occurred since the prior study.  Otherwise no definite   acute fracture is noted.         XR CHEST 1 VIEW   Final Result   No acute traumatic findings of the chest.         CT Head W/O Contrast   Final Result      1.  No evidence of acute intracranial process.      2.  Findings of presumed small vessel ischemic deep white matter disease.         CT CERVICAL SPINE WO CONTRAST   Final Result      1.  No evidence of acute fracture with degenerative and/or chronic changes as   described.      2.  12 mm stable nodule with calcification left thyroid.  Based on the   patient's age and the size and appearance of the nodule, no additional   follow-up is required.             ------------------------- NURSING NOTES AND VITALS REVIEWED ---------------------------   The nursing notes within the ED encounter and vital signs as below have been reviewed by myself.  /61   Pulse 77   Temp 98.5 °F (36.9 °C) (Oral)   Resp 20   Wt 56.7 kg (125 lb)   SpO2 96%   BMI 24.41 kg/m²   Oxygen Saturation Interpretation: Normal    The patient’s available past medical records and past encounters were reviewed.        ------------------------------ ED COURSE/MEDICAL DECISION MAKING----------------------  Medications   acetaminophen (TYLENOL) tablet 1,000 mg (1,000 mg Oral Not Given 7/20/24 1439)         ED COURSE:  ED Course as of 07/20/24 1812   Sat Jul 20, 2024   1735 Spoke with Rebecca, patient's daughter and POA.  She states that patient normally ambulates with a walker and she feels comfortable with plan to discharge back to assisted living facility. [SS]      ED Course User Index  [SS] Jaydon Huerta MD       Independent interpretation of tests:  XR PELVIS - pubic ramus fractures of indeterminate age  CXR - no focal consolidation, pneumothorax, or pleural effusion       Social determinants of health affecting patient care:  poor medical literacy    The patient presents with a new acute problem or

## 2024-07-20 NOTE — DISCHARGE INSTRUCTIONS
Imaging negative for acute traumatic injury.  Old pubic ramus fractures were identified.  Patient also has what is likely a hematoma on the abdominal wall on the right side.  Given her history of anticoagulation with Xarelto please watch this for any worsening pain or signs or symptoms of anemia.

## 2024-09-04 ENCOUNTER — APPOINTMENT (OUTPATIENT)
Dept: CT IMAGING | Age: 84
End: 2024-09-04
Payer: MEDICARE

## 2024-09-04 ENCOUNTER — APPOINTMENT (OUTPATIENT)
Dept: ULTRASOUND IMAGING | Age: 84
End: 2024-09-04
Payer: MEDICARE

## 2024-09-04 ENCOUNTER — HOSPITAL ENCOUNTER (EMERGENCY)
Age: 84
Discharge: HOME | End: 2024-09-04
Attending: EMERGENCY MEDICINE
Payer: MEDICARE

## 2024-09-04 VITALS
DIASTOLIC BLOOD PRESSURE: 67 MMHG | OXYGEN SATURATION: 100 % | TEMPERATURE: 98.1 F | RESPIRATION RATE: 19 BRPM | HEART RATE: 82 BPM | SYSTOLIC BLOOD PRESSURE: 143 MMHG
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
## 2024-09-04 DIAGNOSIS — R53.1 GENERAL WEAKNESS: ICD-10-CM

## 2024-09-04 DIAGNOSIS — Z71.89 DNR (DO NOT RESUSCITATE) DISCUSSION: ICD-10-CM

## 2024-09-04 LAB
ALBUMIN SERPL-MCNC: 3.6 G/DL (ref 3.5–5.2)
ALP SERPL-CCNC: 127 U/L (ref 35–104)
ALT SERPL-CCNC: 24 U/L (ref 0–32)
ANION GAP SERPL CALCULATED.3IONS-SCNC: 8 MMOL/L (ref 7–16)
AST SERPL-CCNC: 23 U/L (ref 0–31)
BASOPHILS # BLD: 0.01 K/UL (ref 0–0.2)
BASOPHILS NFR BLD: 0 % (ref 0–2)
BILIRUB SERPL-MCNC: 0.5 MG/DL (ref 0–1.2)
BILIRUB UR QL STRIP: NEGATIVE
BUN SERPL-MCNC: 9 MG/DL (ref 6–23)
CALCIUM SERPL-MCNC: 8.9 MG/DL (ref 8.6–10.2)
CHLORIDE SERPL-SCNC: 101 MMOL/L (ref 98–107)
CK SERPL-CCNC: 46 U/L (ref 20–180)
CLARITY UR: CLEAR
CO2 SERPL-SCNC: 32 MMOL/L (ref 22–29)
COLOR UR: YELLOW
CREAT SERPL-MCNC: 0.7 MG/DL (ref 0.5–1)
EOSINOPHIL # BLD: 0.02 K/UL (ref 0.05–0.5)
EOSINOPHILS RELATIVE PERCENT: 0 % (ref 0–6)
ERYTHROCYTE [DISTWIDTH] IN BLOOD BY AUTOMATED COUNT: 14.3 % (ref 11.5–15)
GFR, ESTIMATED: 87 ML/MIN/1.73M2
GLUCOSE SERPL-MCNC: 75 MG/DL (ref 74–99)
GLUCOSE UR STRIP-MCNC: NEGATIVE MG/DL
HCT VFR BLD AUTO: 41.2 % (ref 34–48)
HGB BLD-MCNC: 13.4 G/DL (ref 11.5–15.5)
HGB UR QL STRIP.AUTO: NEGATIVE
IMM GRANULOCYTES # BLD AUTO: <0.03 K/UL (ref 0–0.58)
IMM GRANULOCYTES NFR BLD: 0 % (ref 0–5)
KETONES UR STRIP-MCNC: NEGATIVE MG/DL
LEUKOCYTE ESTERASE UR QL STRIP: NEGATIVE
LYMPHOCYTES NFR BLD: 2.76 K/UL (ref 1.5–4)
LYMPHOCYTES RELATIVE PERCENT: 40 % (ref 20–42)
MCH RBC QN AUTO: 30.9 PG (ref 26–35)
MCHC RBC AUTO-ENTMCNC: 32.5 G/DL (ref 32–34.5)
MCV RBC AUTO: 94.9 FL (ref 80–99.9)
MONOCYTES NFR BLD: 0.68 K/UL (ref 0.1–0.95)
MONOCYTES NFR BLD: 10 % (ref 2–12)
NEUTROPHILS NFR BLD: 50 % (ref 43–80)
NEUTS SEG NFR BLD: 3.46 K/UL (ref 1.8–7.3)
NITRITE UR QL STRIP: NEGATIVE
PH UR STRIP: 6.5 [PH] (ref 5–9)
PLATELET # BLD AUTO: 168 K/UL (ref 130–450)
PMV BLD AUTO: 11.6 FL (ref 7–12)
POTASSIUM SERPL-SCNC: 3.9 MMOL/L (ref 3.5–5)
PROT SERPL-MCNC: 6.3 G/DL (ref 6.4–8.3)
PROT UR STRIP-MCNC: NEGATIVE MG/DL
RBC # BLD AUTO: 4.34 M/UL (ref 3.5–5.5)
RBC #/AREA URNS HPF: ABNORMAL /HPF
SODIUM SERPL-SCNC: 141 MMOL/L (ref 132–146)
SP GR UR STRIP: 1.02 (ref 1–1.03)
TROPONIN I SERPL HS-MCNC: 18 NG/L (ref 0–9)
UROBILINOGEN UR STRIP-ACNC: 2 EU/DL (ref 0–1)
WBC #/AREA URNS HPF: ABNORMAL /HPF
WBC OTHER # BLD: 7 K/UL (ref 4.5–11.5)

## 2024-09-04 PROCEDURE — 36415 COLL VENOUS BLD VENIPUNCTURE: CPT

## 2024-09-04 PROCEDURE — 85025 COMPLETE CBC W/AUTO DIFF WBC: CPT

## 2024-09-04 PROCEDURE — 81001 URINALYSIS AUTO W/SCOPE: CPT

## 2024-09-04 PROCEDURE — 72125 CT NECK SPINE W/O DYE: CPT

## 2024-09-04 PROCEDURE — 99284 EMERGENCY DEPT VISIT MOD MDM: CPT

## 2024-09-04 PROCEDURE — 82550 ASSAY OF CK (CPK): CPT

## 2024-09-04 PROCEDURE — 70450 CT HEAD/BRAIN W/O DYE: CPT

## 2024-09-04 PROCEDURE — 93005 ELECTROCARDIOGRAM TRACING: CPT

## 2024-09-04 PROCEDURE — 80053 COMPREHEN METABOLIC PANEL: CPT

## 2024-09-04 PROCEDURE — 93970 EXTREMITY STUDY: CPT

## 2024-09-04 PROCEDURE — 84484 ASSAY OF TROPONIN QUANT: CPT

## 2024-09-04 ASSESSMENT — PAIN SCALES - GENERAL: PAINLEVEL_OUTOF10: 0

## 2024-09-04 NOTE — ED PROVIDER NOTES
ARTHROPLASTY (JOLYNN) performed by ANETA Martinez DO at [facility] OR       CURRENTMEDICATIONS       Discharge Medication List as of 9/4/2024  4:40 PM        CONTINUE these medications which have NOT CHANGED    Details   melatonin 5 MG TBDP disintegrating tablet Take 1 tablet by mouth every eveningDC to SNF      venlafaxine (EFFEXOR XR) 150 MG extended release capsule Take 1 capsule by mouth daily, Disp-90 capsule, R-0Normal      rivaroxaban (XARELTO) 20 MG TABS tablet Take 1 tablet by mouth daily (with breakfast), Disp-90 tablet, R-1Print      Prenatal Vit-Fe Fumarate-FA (PREPLUS) 27-1 MG TABS Take 1 tablet by mouth dailyHistorical Med      atorvastatin (LIPITOR) 40 MG tablet Take 1 tablet by mouth nightly, Disp-30 tablet, R-3Normal      acetaminophen (TYLENOL) 325 MG tablet Take 500 mg by mouth every 6 hours as needed for Pain Takes 3 tabs in am and 3 tab at nightHistorical Med             ALLERGIES     Demerol hcl [meperidine] and Iv dye [iodides]    FAMILYHISTORY       Family History   Problem Relation Age of Onset    Cancer Mother         leukemia    No Known Problems Father     No Known Problems Sister     No Known Problems Brother         SOCIAL HISTORY       Social History     Tobacco Use    Smoking status: Never    Smokeless tobacco: Never   Vaping Use    Vaping status: Never Used   Substance Use Topics    Alcohol use: No     Comment: No coffee [employer]    Drug use: No       SCREENINGS        Rox Coma Scale  Eye Opening: Spontaneous  Best Verbal Response: Oriented  Best Motor Response: Obeys commands  Rox Coma Scale Score: 15                CIWA Assessment  BP: (!) 143/67  Pulse: 82           PHYSICAL EXAM  1 or more Elements     ED Triage Vitals [09/04/24 1330]   BP Systolic BP Percentile Diastolic BP Percentile Temp Temp src Pulse Respirations SpO2   115/70 -- -- 98.1 °F (36.7 °C) -- 64 18 98 %      Height Weight         -- --             Physical Exam  Constitutional:       General: She is not in

## 2024-09-04 NOTE — DISCHARGE INSTRUCTIONS
Please follow up with primary provider  Return to emergency room if patient suddenly has weakness on one side of the body or sudden onset confusion

## 2024-09-05 LAB
EKG ATRIAL RATE: 71 BPM
EKG P AXIS: 75 DEGREES
EKG P-R INTERVAL: 170 MS
EKG Q-T INTERVAL: 420 MS
EKG QRS DURATION: 64 MS
EKG QTC CALCULATION (BAZETT): 456 MS
EKG R AXIS: 46 DEGREES
EKG T AXIS: 49 DEGREES
EKG VENTRICULAR RATE: 71 BPM

## 2024-09-05 PROCEDURE — 93010 ELECTROCARDIOGRAM REPORT: CPT | Performed by: INTERNAL MEDICINE

## 2024-09-18 ENCOUNTER — APPOINTMENT (OUTPATIENT)
Dept: GENERAL RADIOLOGY | Age: 84
DRG: 603 | End: 2024-09-18
Payer: MEDICARE

## 2024-09-18 ENCOUNTER — APPOINTMENT (OUTPATIENT)
Dept: ULTRASOUND IMAGING | Age: 84
DRG: 603 | End: 2024-09-18
Payer: MEDICARE

## 2024-09-18 ENCOUNTER — HOSPITAL ENCOUNTER (INPATIENT)
Age: 84
LOS: 7 days | Discharge: NURSING, REHAB OR LONG-TERM CARE | DRG: 603 | End: 2024-09-25
Attending: EMERGENCY MEDICINE | Admitting: FAMILY MEDICINE
Payer: MEDICARE

## 2024-09-18 DIAGNOSIS — L03.115 CELLULITIS OF RIGHT LOWER EXTREMITY: Primary | ICD-10-CM

## 2024-09-18 PROBLEM — L03.90 CELLULITIS: Status: ACTIVE | Noted: 2024-09-18

## 2024-09-18 LAB
ALBUMIN SERPL-MCNC: 3.2 G/DL (ref 3.5–5.2)
ALP SERPL-CCNC: 139 U/L (ref 35–104)
ALT SERPL-CCNC: 21 U/L (ref 0–32)
ANION GAP SERPL CALCULATED.3IONS-SCNC: 9 MMOL/L (ref 7–16)
AST SERPL-CCNC: 28 U/L (ref 0–31)
BASOPHILS # BLD: 0.02 K/UL (ref 0–0.2)
BASOPHILS NFR BLD: 0 % (ref 0–2)
BILIRUB SERPL-MCNC: 0.5 MG/DL (ref 0–1.2)
BUN SERPL-MCNC: 15 MG/DL (ref 6–23)
CALCIUM SERPL-MCNC: 8.9 MG/DL (ref 8.6–10.2)
CHLORIDE SERPL-SCNC: 97 MMOL/L (ref 98–107)
CO2 SERPL-SCNC: 32 MMOL/L (ref 22–29)
CREAT SERPL-MCNC: 0.6 MG/DL (ref 0.5–1)
EOSINOPHIL # BLD: 0.03 K/UL (ref 0.05–0.5)
EOSINOPHILS RELATIVE PERCENT: 0 % (ref 0–6)
ERYTHROCYTE [DISTWIDTH] IN BLOOD BY AUTOMATED COUNT: 13.3 % (ref 11.5–15)
ERYTHROCYTE [SEDIMENTATION RATE] IN BLOOD BY WESTERGREN METHOD: 25 MM/HR (ref 0–20)
GFR, ESTIMATED: 89 ML/MIN/1.73M2
GLUCOSE SERPL-MCNC: 88 MG/DL (ref 74–99)
HCT VFR BLD AUTO: 42.1 % (ref 34–48)
HGB BLD-MCNC: 13.9 G/DL (ref 11.5–15.5)
IMM GRANULOCYTES # BLD AUTO: <0.03 K/UL (ref 0–0.58)
IMM GRANULOCYTES NFR BLD: 0 % (ref 0–5)
LACTATE BLDV-SCNC: 1.2 MMOL/L (ref 0.5–1.9)
LYMPHOCYTES NFR BLD: 2.48 K/UL (ref 1.5–4)
LYMPHOCYTES RELATIVE PERCENT: 36 % (ref 20–42)
MCH RBC QN AUTO: 31.4 PG (ref 26–35)
MCHC RBC AUTO-ENTMCNC: 33 G/DL (ref 32–34.5)
MCV RBC AUTO: 95.2 FL (ref 80–99.9)
MONOCYTES NFR BLD: 0.82 K/UL (ref 0.1–0.95)
MONOCYTES NFR BLD: 12 % (ref 2–12)
NEUTROPHILS NFR BLD: 51 % (ref 43–80)
NEUTS SEG NFR BLD: 3.54 K/UL (ref 1.8–7.3)
PLATELET # BLD AUTO: 244 K/UL (ref 130–450)
PMV BLD AUTO: 10.8 FL (ref 7–12)
POTASSIUM SERPL-SCNC: 3.5 MMOL/L (ref 3.5–5)
PROT SERPL-MCNC: 6.5 G/DL (ref 6.4–8.3)
RBC # BLD AUTO: 4.42 M/UL (ref 3.5–5.5)
SODIUM SERPL-SCNC: 138 MMOL/L (ref 132–146)
URATE SERPL-MCNC: 6 MG/DL (ref 2.4–5.7)
WBC OTHER # BLD: 6.9 K/UL (ref 4.5–11.5)

## 2024-09-18 PROCEDURE — 2580000003 HC RX 258: Performed by: EMERGENCY MEDICINE

## 2024-09-18 PROCEDURE — 6370000000 HC RX 637 (ALT 250 FOR IP): Performed by: FAMILY MEDICINE

## 2024-09-18 PROCEDURE — 87040 BLOOD CULTURE FOR BACTERIA: CPT

## 2024-09-18 PROCEDURE — 6360000002 HC RX W HCPCS: Performed by: EMERGENCY MEDICINE

## 2024-09-18 PROCEDURE — 73610 X-RAY EXAM OF ANKLE: CPT

## 2024-09-18 PROCEDURE — 84550 ASSAY OF BLOOD/URIC ACID: CPT

## 2024-09-18 PROCEDURE — 2580000003 HC RX 258: Performed by: FAMILY MEDICINE

## 2024-09-18 PROCEDURE — 6370000000 HC RX 637 (ALT 250 FOR IP): Performed by: EMERGENCY MEDICINE

## 2024-09-18 PROCEDURE — 1200000000 HC SEMI PRIVATE

## 2024-09-18 PROCEDURE — 80053 COMPREHEN METABOLIC PANEL: CPT

## 2024-09-18 PROCEDURE — 81001 URINALYSIS AUTO W/SCOPE: CPT

## 2024-09-18 PROCEDURE — 93971 EXTREMITY STUDY: CPT

## 2024-09-18 PROCEDURE — 85025 COMPLETE CBC W/AUTO DIFF WBC: CPT

## 2024-09-18 PROCEDURE — 85652 RBC SED RATE AUTOMATED: CPT

## 2024-09-18 PROCEDURE — 83605 ASSAY OF LACTIC ACID: CPT

## 2024-09-18 PROCEDURE — 87086 URINE CULTURE/COLONY COUNT: CPT

## 2024-09-18 PROCEDURE — 99285 EMERGENCY DEPT VISIT HI MDM: CPT

## 2024-09-18 RX ORDER — ACETAMINOPHEN 325 MG/1
650 TABLET ORAL EVERY 6 HOURS PRN
Status: DISCONTINUED | OUTPATIENT
Start: 2024-09-18 | End: 2024-09-25 | Stop reason: HOSPADM

## 2024-09-18 RX ORDER — ACETAMINOPHEN 325 MG/1
650 TABLET ORAL ONCE
Status: COMPLETED | OUTPATIENT
Start: 2024-09-18 | End: 2024-09-18

## 2024-09-18 RX ORDER — DONEPEZIL HYDROCHLORIDE 10 MG/1
10 TABLET, FILM COATED ORAL 2 TIMES DAILY
COMMUNITY

## 2024-09-18 RX ORDER — POLYETHYLENE GLYCOL 3350 17 G/17G
17 POWDER, FOR SOLUTION ORAL DAILY PRN
Status: DISCONTINUED | OUTPATIENT
Start: 2024-09-18 | End: 2024-09-25 | Stop reason: HOSPADM

## 2024-09-18 RX ORDER — SODIUM CHLORIDE 0.9 % (FLUSH) 0.9 %
5-40 SYRINGE (ML) INJECTION PRN
Status: DISCONTINUED | OUTPATIENT
Start: 2024-09-18 | End: 2024-09-25 | Stop reason: HOSPADM

## 2024-09-18 RX ORDER — DIVALPROEX SODIUM 125 MG/1
125 CAPSULE, COATED PELLETS ORAL 2 TIMES DAILY
Status: ON HOLD | COMMUNITY
End: 2024-09-24 | Stop reason: HOSPADM

## 2024-09-18 RX ORDER — ONDANSETRON 2 MG/ML
4 INJECTION INTRAMUSCULAR; INTRAVENOUS EVERY 6 HOURS PRN
Status: DISCONTINUED | OUTPATIENT
Start: 2024-09-18 | End: 2024-09-25 | Stop reason: HOSPADM

## 2024-09-18 RX ORDER — SODIUM CHLORIDE 0.9 % (FLUSH) 0.9 %
5-40 SYRINGE (ML) INJECTION EVERY 12 HOURS SCHEDULED
Status: DISCONTINUED | OUTPATIENT
Start: 2024-09-18 | End: 2024-09-25 | Stop reason: HOSPADM

## 2024-09-18 RX ORDER — CALCIUM CARBONATE 500(1250)
500 TABLET ORAL DAILY
COMMUNITY

## 2024-09-18 RX ORDER — DIVALPROEX SODIUM 125 MG/1
125 CAPSULE, COATED PELLETS ORAL 2 TIMES DAILY
Status: DISCONTINUED | OUTPATIENT
Start: 2024-09-18 | End: 2024-09-19

## 2024-09-18 RX ORDER — M-VIT,TX,IRON,MINS/CALC/FOLIC 27MG-0.4MG
1 TABLET ORAL DAILY
Status: DISCONTINUED | OUTPATIENT
Start: 2024-09-18 | End: 2024-09-25 | Stop reason: HOSPADM

## 2024-09-18 RX ORDER — VENLAFAXINE HYDROCHLORIDE 150 MG/1
150 CAPSULE, EXTENDED RELEASE ORAL DAILY
Status: DISCONTINUED | OUTPATIENT
Start: 2024-09-19 | End: 2024-09-25 | Stop reason: HOSPADM

## 2024-09-18 RX ORDER — HYDROXYZINE PAMOATE 25 MG/1
25 CAPSULE ORAL 3 TIMES DAILY PRN
Status: DISCONTINUED | OUTPATIENT
Start: 2024-09-18 | End: 2024-09-25 | Stop reason: HOSPADM

## 2024-09-18 RX ORDER — ACETAMINOPHEN 650 MG/1
650 SUPPOSITORY RECTAL EVERY 6 HOURS PRN
Status: DISCONTINUED | OUTPATIENT
Start: 2024-09-18 | End: 2024-09-25 | Stop reason: HOSPADM

## 2024-09-18 RX ORDER — ACETAMINOPHEN 500 MG
500 TABLET ORAL EVERY 6 HOURS PRN
Status: DISCONTINUED | OUTPATIENT
Start: 2024-09-18 | End: 2024-09-18 | Stop reason: SDUPTHER

## 2024-09-18 RX ORDER — ENOXAPARIN SODIUM 100 MG/ML
40 INJECTION SUBCUTANEOUS DAILY
Status: DISCONTINUED | OUTPATIENT
Start: 2024-09-18 | End: 2024-09-18

## 2024-09-18 RX ORDER — POTASSIUM CHLORIDE 7.45 MG/ML
10 INJECTION INTRAVENOUS PRN
Status: DISCONTINUED | OUTPATIENT
Start: 2024-09-18 | End: 2024-09-25 | Stop reason: HOSPADM

## 2024-09-18 RX ORDER — MAGNESIUM SULFATE IN WATER 40 MG/ML
2000 INJECTION, SOLUTION INTRAVENOUS PRN
Status: DISCONTINUED | OUTPATIENT
Start: 2024-09-18 | End: 2024-09-25 | Stop reason: HOSPADM

## 2024-09-18 RX ORDER — POTASSIUM CHLORIDE 1500 MG/1
40 TABLET, EXTENDED RELEASE ORAL PRN
Status: DISCONTINUED | OUTPATIENT
Start: 2024-09-18 | End: 2024-09-25 | Stop reason: HOSPADM

## 2024-09-18 RX ORDER — HYDROXYZINE PAMOATE 25 MG/1
25 CAPSULE ORAL 3 TIMES DAILY PRN
COMMUNITY

## 2024-09-18 RX ORDER — ATORVASTATIN CALCIUM 40 MG/1
40 TABLET, FILM COATED ORAL NIGHTLY
Status: DISCONTINUED | OUTPATIENT
Start: 2024-09-18 | End: 2024-09-25 | Stop reason: HOSPADM

## 2024-09-18 RX ORDER — MECOBALAMIN 5000 MCG
5 TABLET,DISINTEGRATING ORAL EVERY EVENING
Status: DISCONTINUED | OUTPATIENT
Start: 2024-09-18 | End: 2024-09-25 | Stop reason: HOSPADM

## 2024-09-18 RX ORDER — ONDANSETRON 4 MG/1
4 TABLET, ORALLY DISINTEGRATING ORAL EVERY 8 HOURS PRN
Status: DISCONTINUED | OUTPATIENT
Start: 2024-09-18 | End: 2024-09-25 | Stop reason: HOSPADM

## 2024-09-18 RX ORDER — SODIUM CHLORIDE 9 MG/ML
INJECTION, SOLUTION INTRAVENOUS PRN
Status: DISCONTINUED | OUTPATIENT
Start: 2024-09-18 | End: 2024-09-25 | Stop reason: HOSPADM

## 2024-09-18 RX ORDER — CALCIUM CARBONATE 500 MG/1
500 TABLET, CHEWABLE ORAL DAILY
Status: DISCONTINUED | OUTPATIENT
Start: 2024-09-18 | End: 2024-09-25 | Stop reason: HOSPADM

## 2024-09-18 RX ORDER — DONEPEZIL HYDROCHLORIDE 5 MG/1
10 TABLET, FILM COATED ORAL NIGHTLY
Status: DISCONTINUED | OUTPATIENT
Start: 2024-09-19 | End: 2024-09-25 | Stop reason: HOSPADM

## 2024-09-18 RX ADMIN — ACETAMINOPHEN 650 MG: 325 TABLET ORAL at 12:14

## 2024-09-18 RX ADMIN — Medication 1 TABLET: at 20:50

## 2024-09-18 RX ADMIN — WATER 1000 MG: 1 INJECTION INTRAMUSCULAR; INTRAVENOUS; SUBCUTANEOUS at 16:48

## 2024-09-18 RX ADMIN — DIVALPROEX SODIUM 125 MG: 125 CAPSULE ORAL at 20:51

## 2024-09-18 RX ADMIN — Medication 10 ML: at 20:53

## 2024-09-18 RX ADMIN — Medication 5 MG: at 20:50

## 2024-09-18 RX ADMIN — ATORVASTATIN CALCIUM 40 MG: 40 TABLET, FILM COATED ORAL at 20:50

## 2024-09-18 ASSESSMENT — LIFESTYLE VARIABLES
HOW OFTEN DO YOU HAVE A DRINK CONTAINING ALCOHOL: NEVER
HOW MANY STANDARD DRINKS CONTAINING ALCOHOL DO YOU HAVE ON A TYPICAL DAY: PATIENT DOES NOT DRINK

## 2024-09-18 ASSESSMENT — PAIN SCALES - GENERAL: PAINLEVEL_OUTOF10: 5

## 2024-09-18 ASSESSMENT — PAIN DESCRIPTION - LOCATION: LOCATION: ANKLE

## 2024-09-19 LAB
ANION GAP SERPL CALCULATED.3IONS-SCNC: 9 MMOL/L (ref 7–16)
BACTERIA URNS QL MICRO: ABNORMAL
BASOPHILS # BLD: 0.02 K/UL (ref 0–0.2)
BASOPHILS NFR BLD: 0 % (ref 0–2)
BILIRUB UR QL STRIP: NEGATIVE
BUN SERPL-MCNC: 11 MG/DL (ref 6–23)
CALCIUM SERPL-MCNC: 8.4 MG/DL (ref 8.6–10.2)
CHLORIDE SERPL-SCNC: 97 MMOL/L (ref 98–107)
CLARITY UR: CLEAR
CO2 SERPL-SCNC: 31 MMOL/L (ref 22–29)
COLOR UR: YELLOW
CREAT SERPL-MCNC: 0.6 MG/DL (ref 0.5–1)
EOSINOPHIL # BLD: 0.04 K/UL (ref 0.05–0.5)
EOSINOPHILS RELATIVE PERCENT: 1 % (ref 0–6)
EPI CELLS #/AREA URNS HPF: ABNORMAL /HPF
ERYTHROCYTE [DISTWIDTH] IN BLOOD BY AUTOMATED COUNT: 13.4 % (ref 11.5–15)
GFR, ESTIMATED: >90 ML/MIN/1.73M2
GLUCOSE SERPL-MCNC: 101 MG/DL (ref 74–99)
GLUCOSE UR STRIP-MCNC: NEGATIVE MG/DL
HCT VFR BLD AUTO: 36 % (ref 34–48)
HGB BLD-MCNC: 12 G/DL (ref 11.5–15.5)
HGB UR QL STRIP.AUTO: NEGATIVE
IMM GRANULOCYTES # BLD AUTO: <0.03 K/UL (ref 0–0.58)
IMM GRANULOCYTES NFR BLD: 0 % (ref 0–5)
KETONES UR STRIP-MCNC: NEGATIVE MG/DL
LEUKOCYTE ESTERASE UR QL STRIP: NEGATIVE
LYMPHOCYTES NFR BLD: 2.21 K/UL (ref 1.5–4)
LYMPHOCYTES RELATIVE PERCENT: 36 % (ref 20–42)
MAGNESIUM SERPL-MCNC: 1.7 MG/DL (ref 1.6–2.6)
MCH RBC QN AUTO: 31.2 PG (ref 26–35)
MCHC RBC AUTO-ENTMCNC: 33.3 G/DL (ref 32–34.5)
MCV RBC AUTO: 93.5 FL (ref 80–99.9)
MONOCYTES NFR BLD: 0.73 K/UL (ref 0.1–0.95)
MONOCYTES NFR BLD: 12 % (ref 2–12)
NEUTROPHILS NFR BLD: 51 % (ref 43–80)
NEUTS SEG NFR BLD: 3.08 K/UL (ref 1.8–7.3)
NITRITE UR QL STRIP: NEGATIVE
PH UR STRIP: 7 [PH] (ref 5–9)
PLATELET # BLD AUTO: 235 K/UL (ref 130–450)
PMV BLD AUTO: 10.8 FL (ref 7–12)
POTASSIUM SERPL-SCNC: 3.2 MMOL/L (ref 3.5–5)
PROT UR STRIP-MCNC: NEGATIVE MG/DL
RBC # BLD AUTO: 3.85 M/UL (ref 3.5–5.5)
RBC #/AREA URNS HPF: ABNORMAL /HPF
SODIUM SERPL-SCNC: 137 MMOL/L (ref 132–146)
SP GR UR STRIP: 1.01 (ref 1–1.03)
UROBILINOGEN UR STRIP-ACNC: 1 EU/DL (ref 0–1)
WBC #/AREA URNS HPF: ABNORMAL /HPF
WBC OTHER # BLD: 6.1 K/UL (ref 4.5–11.5)

## 2024-09-19 PROCEDURE — 6370000000 HC RX 637 (ALT 250 FOR IP): Performed by: FAMILY MEDICINE

## 2024-09-19 PROCEDURE — 1200000000 HC SEMI PRIVATE

## 2024-09-19 PROCEDURE — 36415 COLL VENOUS BLD VENIPUNCTURE: CPT

## 2024-09-19 PROCEDURE — 83735 ASSAY OF MAGNESIUM: CPT

## 2024-09-19 PROCEDURE — 80048 BASIC METABOLIC PNL TOTAL CA: CPT

## 2024-09-19 PROCEDURE — 97530 THERAPEUTIC ACTIVITIES: CPT | Performed by: PHYSICAL THERAPIST

## 2024-09-19 PROCEDURE — 6360000002 HC RX W HCPCS: Performed by: FAMILY MEDICINE

## 2024-09-19 PROCEDURE — 97161 PT EVAL LOW COMPLEX 20 MIN: CPT | Performed by: PHYSICAL THERAPIST

## 2024-09-19 PROCEDURE — 2580000003 HC RX 258: Performed by: FAMILY MEDICINE

## 2024-09-19 PROCEDURE — 85025 COMPLETE CBC W/AUTO DIFF WBC: CPT

## 2024-09-19 PROCEDURE — 97535 SELF CARE MNGMENT TRAINING: CPT

## 2024-09-19 PROCEDURE — 97165 OT EVAL LOW COMPLEX 30 MIN: CPT

## 2024-09-19 RX ORDER — HALOPERIDOL 0.5 MG/1
0.5 TABLET ORAL EVERY 6 HOURS PRN
Status: DISCONTINUED | OUTPATIENT
Start: 2024-09-19 | End: 2024-09-25 | Stop reason: HOSPADM

## 2024-09-19 RX ORDER — DIVALPROEX SODIUM 125 MG/1
250 CAPSULE, COATED PELLETS ORAL 2 TIMES DAILY
Status: DISCONTINUED | OUTPATIENT
Start: 2024-09-19 | End: 2024-09-25 | Stop reason: HOSPADM

## 2024-09-19 RX ORDER — HALOPERIDOL 5 MG/ML
5 INJECTION INTRAMUSCULAR ONCE
Status: COMPLETED | OUTPATIENT
Start: 2024-09-19 | End: 2024-09-19

## 2024-09-19 RX ORDER — POTASSIUM CHLORIDE 1500 MG/1
40 TABLET, EXTENDED RELEASE ORAL ONCE
Status: COMPLETED | OUTPATIENT
Start: 2024-09-19 | End: 2024-09-19

## 2024-09-19 RX ADMIN — CALCIUM CARBONATE 500 MG: 500 TABLET, CHEWABLE ORAL at 10:12

## 2024-09-19 RX ADMIN — POTASSIUM CHLORIDE 40 MEQ: 1500 TABLET, EXTENDED RELEASE ORAL at 10:12

## 2024-09-19 RX ADMIN — VENLAFAXINE HYDROCHLORIDE 150 MG: 150 CAPSULE, EXTENDED RELEASE ORAL at 10:13

## 2024-09-19 RX ADMIN — WATER 1000 MG: 1 INJECTION INTRAMUSCULAR; INTRAVENOUS; SUBCUTANEOUS at 17:30

## 2024-09-19 RX ADMIN — DONEPEZIL HYDROCHLORIDE 10 MG: 5 TABLET, FILM COATED ORAL at 19:22

## 2024-09-19 RX ADMIN — Medication 1 TABLET: at 10:13

## 2024-09-19 RX ADMIN — RIVAROXABAN 20 MG: 20 TABLET, FILM COATED ORAL at 10:12

## 2024-09-19 RX ADMIN — DIVALPROEX SODIUM 250 MG: 125 CAPSULE ORAL at 19:21

## 2024-09-19 RX ADMIN — Medication 10 ML: at 10:20

## 2024-09-19 RX ADMIN — Medication 10 ML: at 19:20

## 2024-09-19 RX ADMIN — ATORVASTATIN CALCIUM 40 MG: 40 TABLET, FILM COATED ORAL at 19:21

## 2024-09-19 RX ADMIN — HALOPERIDOL LACTATE 5 MG: 5 INJECTION, SOLUTION INTRAMUSCULAR at 14:46

## 2024-09-19 RX ADMIN — Medication 5 MG: at 19:22

## 2024-09-20 PROCEDURE — 6360000002 HC RX W HCPCS: Performed by: FAMILY MEDICINE

## 2024-09-20 PROCEDURE — 2580000003 HC RX 258: Performed by: FAMILY MEDICINE

## 2024-09-20 PROCEDURE — 6370000000 HC RX 637 (ALT 250 FOR IP): Performed by: FAMILY MEDICINE

## 2024-09-20 PROCEDURE — 1200000000 HC SEMI PRIVATE

## 2024-09-20 RX ADMIN — DIVALPROEX SODIUM 250 MG: 125 CAPSULE ORAL at 09:30

## 2024-09-20 RX ADMIN — Medication 1 TABLET: at 09:30

## 2024-09-20 RX ADMIN — WATER 1000 MG: 1 INJECTION INTRAMUSCULAR; INTRAVENOUS; SUBCUTANEOUS at 15:44

## 2024-09-20 RX ADMIN — VENLAFAXINE HYDROCHLORIDE 150 MG: 150 CAPSULE, EXTENDED RELEASE ORAL at 09:30

## 2024-09-20 RX ADMIN — RIVAROXABAN 20 MG: 20 TABLET, FILM COATED ORAL at 09:30

## 2024-09-20 RX ADMIN — ATORVASTATIN CALCIUM 40 MG: 40 TABLET, FILM COATED ORAL at 20:44

## 2024-09-20 RX ADMIN — Medication 5 MG: at 18:05

## 2024-09-20 RX ADMIN — CALCIUM CARBONATE 500 MG: 500 TABLET, CHEWABLE ORAL at 09:30

## 2024-09-20 RX ADMIN — Medication 10 ML: at 20:52

## 2024-09-20 RX ADMIN — DONEPEZIL HYDROCHLORIDE 10 MG: 5 TABLET, FILM COATED ORAL at 20:44

## 2024-09-20 RX ADMIN — DIVALPROEX SODIUM 250 MG: 125 CAPSULE ORAL at 20:44

## 2024-09-20 RX ADMIN — Medication 10 ML: at 09:30

## 2024-09-20 RX ADMIN — POLYETHYLENE GLYCOL 3350 17 G: 17 POWDER, FOR SOLUTION ORAL at 15:56

## 2024-09-21 LAB
MICROORGANISM SPEC CULT: ABNORMAL
SERVICE CMNT-IMP: ABNORMAL
SPECIMEN DESCRIPTION: ABNORMAL

## 2024-09-21 PROCEDURE — 1200000000 HC SEMI PRIVATE

## 2024-09-21 PROCEDURE — 6370000000 HC RX 637 (ALT 250 FOR IP): Performed by: FAMILY MEDICINE

## 2024-09-21 RX ADMIN — Medication 5 MG: at 20:47

## 2024-09-21 RX ADMIN — DIVALPROEX SODIUM 250 MG: 125 CAPSULE ORAL at 20:47

## 2024-09-21 RX ADMIN — DONEPEZIL HYDROCHLORIDE 10 MG: 5 TABLET, FILM COATED ORAL at 20:47

## 2024-09-21 RX ADMIN — POTASSIUM BICARBONATE 40 MEQ: 782 TABLET, EFFERVESCENT ORAL at 09:29

## 2024-09-22 PROCEDURE — 6370000000 HC RX 637 (ALT 250 FOR IP): Performed by: FAMILY MEDICINE

## 2024-09-22 PROCEDURE — 1200000000 HC SEMI PRIVATE

## 2024-09-22 RX ADMIN — DIVALPROEX SODIUM 250 MG: 125 CAPSULE ORAL at 21:18

## 2024-09-22 RX ADMIN — DIVALPROEX SODIUM 250 MG: 125 CAPSULE ORAL at 08:02

## 2024-09-22 RX ADMIN — HYDROXYZINE PAMOATE 25 MG: 25 CAPSULE ORAL at 21:18

## 2024-09-22 RX ADMIN — DONEPEZIL HYDROCHLORIDE 10 MG: 5 TABLET, FILM COATED ORAL at 21:18

## 2024-09-22 RX ADMIN — RIVAROXABAN 20 MG: 20 TABLET, FILM COATED ORAL at 08:02

## 2024-09-22 RX ADMIN — VENLAFAXINE HYDROCHLORIDE 150 MG: 150 CAPSULE, EXTENDED RELEASE ORAL at 08:01

## 2024-09-22 RX ADMIN — Medication 1 TABLET: at 08:01

## 2024-09-22 RX ADMIN — Medication 5 MG: at 21:18

## 2024-09-22 RX ADMIN — CALCIUM CARBONATE 500 MG: 500 TABLET, CHEWABLE ORAL at 08:01

## 2024-09-22 RX ADMIN — ATORVASTATIN CALCIUM 40 MG: 40 TABLET, FILM COATED ORAL at 21:21

## 2024-09-23 LAB
MICROORGANISM SPEC CULT: NORMAL
MICROORGANISM SPEC CULT: NORMAL
SERVICE CMNT-IMP: NORMAL
SERVICE CMNT-IMP: NORMAL
SPECIMEN DESCRIPTION: NORMAL
SPECIMEN DESCRIPTION: NORMAL

## 2024-09-23 PROCEDURE — 1200000000 HC SEMI PRIVATE

## 2024-09-23 PROCEDURE — 97535 SELF CARE MNGMENT TRAINING: CPT

## 2024-09-23 PROCEDURE — 97530 THERAPEUTIC ACTIVITIES: CPT

## 2024-09-23 PROCEDURE — 6370000000 HC RX 637 (ALT 250 FOR IP): Performed by: FAMILY MEDICINE

## 2024-09-23 RX ADMIN — RIVAROXABAN 20 MG: 20 TABLET, FILM COATED ORAL at 09:38

## 2024-09-23 RX ADMIN — CALCIUM CARBONATE 500 MG: 500 TABLET, CHEWABLE ORAL at 09:38

## 2024-09-23 RX ADMIN — POTASSIUM BICARBONATE 40 MEQ: 782 TABLET, EFFERVESCENT ORAL at 09:53

## 2024-09-23 RX ADMIN — DONEPEZIL HYDROCHLORIDE 10 MG: 5 TABLET, FILM COATED ORAL at 19:55

## 2024-09-23 RX ADMIN — Medication 1 TABLET: at 09:38

## 2024-09-23 RX ADMIN — VENLAFAXINE HYDROCHLORIDE 150 MG: 150 CAPSULE, EXTENDED RELEASE ORAL at 09:38

## 2024-09-23 RX ADMIN — Medication 5 MG: at 19:55

## 2024-09-23 RX ADMIN — ATORVASTATIN CALCIUM 40 MG: 40 TABLET, FILM COATED ORAL at 19:55

## 2024-09-23 RX ADMIN — DIVALPROEX SODIUM 250 MG: 125 CAPSULE ORAL at 19:55

## 2024-09-23 RX ADMIN — DIVALPROEX SODIUM 250 MG: 125 CAPSULE ORAL at 09:38

## 2024-09-23 ASSESSMENT — ENCOUNTER SYMPTOMS
VOMITING: 0
NAUSEA: 0

## 2024-09-24 PROCEDURE — 6370000000 HC RX 637 (ALT 250 FOR IP): Performed by: FAMILY MEDICINE

## 2024-09-24 PROCEDURE — 1200000000 HC SEMI PRIVATE

## 2024-09-24 PROCEDURE — 97110 THERAPEUTIC EXERCISES: CPT | Performed by: PHYSICAL THERAPIST

## 2024-09-24 PROCEDURE — 97530 THERAPEUTIC ACTIVITIES: CPT | Performed by: PHYSICAL THERAPIST

## 2024-09-24 RX ORDER — DIVALPROEX SODIUM 125 MG/1
250 CAPSULE, COATED PELLETS ORAL 2 TIMES DAILY
Qty: 60 CAPSULE | Refills: 3 | Status: SHIPPED | OUTPATIENT
Start: 2024-09-24

## 2024-09-24 RX ORDER — HALOPERIDOL 0.5 MG/1
0.5 TABLET ORAL EVERY 6 HOURS PRN
Qty: 120 TABLET | Refills: 3 | Status: SHIPPED | OUTPATIENT
Start: 2024-09-24

## 2024-09-24 RX ADMIN — DIVALPROEX SODIUM 250 MG: 125 CAPSULE ORAL at 20:41

## 2024-09-24 RX ADMIN — DIVALPROEX SODIUM 250 MG: 125 CAPSULE ORAL at 08:39

## 2024-09-24 RX ADMIN — HYDROXYZINE PAMOATE 25 MG: 25 CAPSULE ORAL at 22:02

## 2024-09-24 RX ADMIN — VENLAFAXINE HYDROCHLORIDE 150 MG: 150 CAPSULE, EXTENDED RELEASE ORAL at 08:39

## 2024-09-24 RX ADMIN — Medication 5 MG: at 17:52

## 2024-09-24 RX ADMIN — Medication 1 TABLET: at 08:39

## 2024-09-24 RX ADMIN — CALCIUM CARBONATE 500 MG: 500 TABLET, CHEWABLE ORAL at 08:39

## 2024-09-24 RX ADMIN — ATORVASTATIN CALCIUM 40 MG: 40 TABLET, FILM COATED ORAL at 20:42

## 2024-09-24 RX ADMIN — DONEPEZIL HYDROCHLORIDE 10 MG: 5 TABLET, FILM COATED ORAL at 20:42

## 2024-09-24 RX ADMIN — RIVAROXABAN 20 MG: 20 TABLET, FILM COATED ORAL at 08:39

## 2024-09-24 ASSESSMENT — ENCOUNTER SYMPTOMS
VOMITING: 0
NAUSEA: 0

## 2024-09-25 VITALS
DIASTOLIC BLOOD PRESSURE: 60 MMHG | WEIGHT: 134.3 LBS | TEMPERATURE: 97.9 F | BODY MASS INDEX: 25.36 KG/M2 | RESPIRATION RATE: 17 BRPM | HEART RATE: 63 BPM | HEIGHT: 61 IN | OXYGEN SATURATION: 100 % | SYSTOLIC BLOOD PRESSURE: 129 MMHG

## 2024-09-25 PROCEDURE — 6370000000 HC RX 637 (ALT 250 FOR IP): Performed by: FAMILY MEDICINE

## 2024-09-25 RX ADMIN — VENLAFAXINE HYDROCHLORIDE 150 MG: 150 CAPSULE, EXTENDED RELEASE ORAL at 07:59

## 2024-09-25 RX ADMIN — DIVALPROEX SODIUM 250 MG: 125 CAPSULE ORAL at 07:58

## 2024-09-25 RX ADMIN — RIVAROXABAN 20 MG: 20 TABLET, FILM COATED ORAL at 07:59

## 2024-09-25 RX ADMIN — Medication 1 TABLET: at 07:59

## 2024-09-25 RX ADMIN — CALCIUM CARBONATE 500 MG: 500 TABLET, CHEWABLE ORAL at 07:59

## 2024-10-20 ENCOUNTER — APPOINTMENT (OUTPATIENT)
Dept: GENERAL RADIOLOGY | Age: 84
DRG: 481 | End: 2024-10-20
Payer: MEDICARE

## 2024-10-20 ENCOUNTER — APPOINTMENT (OUTPATIENT)
Dept: CT IMAGING | Age: 84
DRG: 481 | End: 2024-10-20
Payer: MEDICARE

## 2024-10-20 ENCOUNTER — HOSPITAL ENCOUNTER (INPATIENT)
Age: 84
LOS: 8 days | Discharge: NURSING, REHAB OR LONG-TERM CARE | DRG: 481 | End: 2024-10-28
Attending: EMERGENCY MEDICINE | Admitting: FAMILY MEDICINE
Payer: MEDICARE

## 2024-10-20 DIAGNOSIS — S72.141A INTERTROCHANTERIC FRACTURE OF RIGHT FEMUR, CLOSED, INITIAL ENCOUNTER (HCC): ICD-10-CM

## 2024-10-20 DIAGNOSIS — S72.141A CLOSED DISPLACED INTERTROCHANTERIC FRACTURE OF RIGHT FEMUR, INITIAL ENCOUNTER (HCC): Primary | ICD-10-CM

## 2024-10-20 LAB
ABO + RH BLD: NORMAL
ALBUMIN SERPL-MCNC: 3.6 G/DL (ref 3.5–5.2)
ALP SERPL-CCNC: 244 U/L (ref 35–104)
ALT SERPL-CCNC: 71 U/L (ref 0–32)
ANION GAP SERPL CALCULATED.3IONS-SCNC: 9 MMOL/L (ref 7–16)
ARM BAND NUMBER: NORMAL
AST SERPL-CCNC: 106 U/L (ref 0–31)
BILIRUB SERPL-MCNC: 0.6 MG/DL (ref 0–1.2)
BLOOD BANK SAMPLE EXPIRATION: NORMAL
BLOOD GROUP ANTIBODIES SERPL: NEGATIVE
BUN SERPL-MCNC: 12 MG/DL (ref 6–23)
CALCIUM SERPL-MCNC: 8.7 MG/DL (ref 8.6–10.2)
CHLORIDE SERPL-SCNC: 101 MMOL/L (ref 98–107)
CO2 SERPL-SCNC: 29 MMOL/L (ref 22–29)
CREAT SERPL-MCNC: 0.6 MG/DL (ref 0.5–1)
ERYTHROCYTE [DISTWIDTH] IN BLOOD BY AUTOMATED COUNT: 13.6 % (ref 11.5–15)
GFR, ESTIMATED: 90 ML/MIN/1.73M2
GLUCOSE SERPL-MCNC: 112 MG/DL (ref 74–99)
HCT VFR BLD AUTO: 44.9 % (ref 34–48)
HGB BLD-MCNC: 14.4 G/DL (ref 11.5–15.5)
INR PPP: 1.3
MCH RBC QN AUTO: 31 PG (ref 26–35)
MCHC RBC AUTO-ENTMCNC: 32.1 G/DL (ref 32–34.5)
MCV RBC AUTO: 96.6 FL (ref 80–99.9)
PARTIAL THROMBOPLASTIN TIME: 33.2 SEC (ref 24.5–35.1)
PLATELET # BLD AUTO: 144 K/UL (ref 130–450)
PMV BLD AUTO: 11.9 FL (ref 7–12)
POTASSIUM SERPL-SCNC: 4.2 MMOL/L (ref 3.5–5)
PROT SERPL-MCNC: 6.6 G/DL (ref 6.4–8.3)
PROTHROMBIN TIME: 14.4 SEC (ref 9.3–12.4)
RBC # BLD AUTO: 4.65 M/UL (ref 3.5–5.5)
SODIUM SERPL-SCNC: 139 MMOL/L (ref 132–146)
WBC OTHER # BLD: 11.1 K/UL (ref 4.5–11.5)

## 2024-10-20 PROCEDURE — 85610 PROTHROMBIN TIME: CPT

## 2024-10-20 PROCEDURE — 86900 BLOOD TYPING SEROLOGIC ABO: CPT

## 2024-10-20 PROCEDURE — 85730 THROMBOPLASTIN TIME PARTIAL: CPT

## 2024-10-20 PROCEDURE — 6360000002 HC RX W HCPCS: Performed by: FAMILY MEDICINE

## 2024-10-20 PROCEDURE — 36415 COLL VENOUS BLD VENIPUNCTURE: CPT

## 2024-10-20 PROCEDURE — 73552 X-RAY EXAM OF FEMUR 2/>: CPT

## 2024-10-20 PROCEDURE — 12001 RPR S/N/AX/GEN/TRNK 2.5CM/<: CPT

## 2024-10-20 PROCEDURE — 73502 X-RAY EXAM HIP UNI 2-3 VIEWS: CPT

## 2024-10-20 PROCEDURE — 85027 COMPLETE CBC AUTOMATED: CPT

## 2024-10-20 PROCEDURE — 99285 EMERGENCY DEPT VISIT HI MDM: CPT

## 2024-10-20 PROCEDURE — 86901 BLOOD TYPING SEROLOGIC RH(D): CPT

## 2024-10-20 PROCEDURE — 0HQ0XZZ REPAIR SCALP SKIN, EXTERNAL APPROACH: ICD-10-PCS

## 2024-10-20 PROCEDURE — 1200000000 HC SEMI PRIVATE

## 2024-10-20 PROCEDURE — 71045 X-RAY EXAM CHEST 1 VIEW: CPT

## 2024-10-20 PROCEDURE — 70450 CT HEAD/BRAIN W/O DYE: CPT

## 2024-10-20 PROCEDURE — 80053 COMPREHEN METABOLIC PANEL: CPT

## 2024-10-20 PROCEDURE — 86850 RBC ANTIBODY SCREEN: CPT

## 2024-10-20 RX ORDER — ALPRAZOLAM 0.25 MG/1
0.25 TABLET ORAL EVERY 12 HOURS PRN
Status: ON HOLD | COMMUNITY
End: 2024-10-25 | Stop reason: HOSPADM

## 2024-10-20 RX ORDER — SERTRALINE HYDROCHLORIDE 25 MG/1
25 TABLET, FILM COATED ORAL DAILY
Status: ON HOLD | COMMUNITY
End: 2024-10-25 | Stop reason: HOSPADM

## 2024-10-20 RX ORDER — VENLAFAXINE 75 MG/1
75 TABLET ORAL DAILY
Status: ON HOLD | COMMUNITY
End: 2024-10-25 | Stop reason: HOSPADM

## 2024-10-20 RX ORDER — VENLAFAXINE 37.5 MG/1
37.5 TABLET ORAL DAILY
Status: ON HOLD | COMMUNITY
End: 2024-10-25 | Stop reason: HOSPADM

## 2024-10-20 RX ORDER — CALCIUM CARBONATE 500(1250)
500 TABLET ORAL DAILY
Status: ON HOLD | COMMUNITY
End: 2024-10-25 | Stop reason: HOSPADM

## 2024-10-20 RX ORDER — MORPHINE SULFATE 2 MG/ML
2 INJECTION, SOLUTION INTRAMUSCULAR; INTRAVENOUS EVERY 4 HOURS PRN
Status: DISCONTINUED | OUTPATIENT
Start: 2024-10-20 | End: 2024-10-22

## 2024-10-20 RX ADMIN — MORPHINE SULFATE 2 MG: 2 INJECTION, SOLUTION INTRAMUSCULAR; INTRAVENOUS at 21:20

## 2024-10-20 ASSESSMENT — PAIN SCALES - GENERAL: PAINLEVEL_OUTOF10: 0

## 2024-10-20 NOTE — ED PROVIDER NOTES
[facility]  EMERGENCY DEPARTMENT ENCOUNTER        Pt Name: Maureen Coliler  MRN: [id number]  Birthdate 1940  Date of evaluation: 10/20/2024  Provider: Catia Lozano DO  PCP: Angel Ortiz DO  Note Started: 5:11 PM EDT 10/20/24    CHIEF COMPLAINT       Chief Complaint   Patient presents with    Fall     Pt presents to ED due to fall. Pt has hx of dementia. EMS gave 2mg of dilaudid PTA.       HISTORY OF PRESENT ILLNESS: 1 or more Elements   Maureen Collier is a 84 y.o. female with a PMHx of Alzheimer dementia and CVA who presents to the emergency department with chief complaint of fall at nursing facility.  Pt hit her head and had a laceration to her posterior scalp.  Is on xarelto.  She was given 2mg of dilaudid en route to the ED.  Patient unable to provide much history on initial evaluation.  Is not complaining of much pain on examination.  No other injuries or deformities noted on physical exam.     Nursing Notes were all reviewed and agreed with or any disagreements were addressed in the HPI.    REVIEW OF SYSTEMS :    Positives and Pertinent negatives as per HPI.    PAST MEDICAL HISTORY/Chronic Conditions Affecting Care    has a past medical history of Arthritis, Cerebral artery occlusion with cerebral infarction (HCC), Depression, DVT (deep vein thrombosis) in pregnancy, DVT of lower extremity, bilateral (HCC), GERD (gastroesophageal reflux disease), blood clots, Hyperlipidemia, Hypertension, and Nephrolithiasis.     SURGICAL HISTORY     Past Surgical History:   Procedure Laterality Date    ABDOMEN SURGERY  2007, 2008    tummy tuck x2    BLADDER SUSPENSION  2011    CHOLECYSTECTOMY, OPEN  3/21/2013    open cholecystectomy with intropertive cholangiogram    COLONOSCOPY      ENDOSCOPY, COLON, DIAGNOSTIC      FOOT SURGERY      [facility]STRIC [facility]PASS [facility]GERY  [facility], [facility]    HYSTERECTOMY (CERVIX STATUS UNKNOWN)  42 years ago    DC ARTHRP KNE CONDYLE&PLATU MEDIAL&LAT  placed.    Total number staples: 2        Medical Decision Making/Differential Diagnosis:   CC/HPI Summary, DDx, ED Course, Reassessment, Tests Considered, Patient expectation:   84 y.o. female who presents to the emergency department with chief complaint of fall and subsequent right hip pain and leg shortening.  She is brought in by EMS and had been given 2mg of dilaudid en route to the ED. She is initially minimally responsive however is spontaneously breathing and still staring off to space.  Vital signs initially stable with a hypertensive blood pressure 155/95.  The right leg does appear to be shortened on initial examination and she also has a small 1 cm laceration on her posterior scalp.  Concerns include hip dislocation, hip fracture, subarachnoid hemorrhage, epidural hematoma, and subdural hematoma.  Imaging studies ordered which demonstrate an intertrochanteric fracture.  There is no evidence of fracture of the femur or acute findings on the chest x-ray.  CT head does not demonstrate any acute intracranial abnormalities.  Laceration repair performed.  Please see procedure note above for further details.  Patient case discussed with Dr Cross who accepts patient for admission.  Page placed to orthopedic surgery who will take the patient for surgery tomorrow.    Shared decision making utilized with patient and present parties, and due to their presenting conditions patient is to be admitted to the hospital for inpatient management and monitoring.  Patient has remained closely monitored with multiple reevaluations and complex medical decision making throughout the course of their emergency department stay.  They have clinically remained stable and have hemodynamically remained stable.  After emergency department management is complete, patient to be admitted to the hospital.    Differential diagnosis includes but is not limited to: hip dislocation, hip fracture, subarachnoid hemorrhage, epidural hematoma,

## 2024-10-21 ENCOUNTER — ANESTHESIA (OUTPATIENT)
Dept: OPERATING ROOM | Age: 84
End: 2024-10-21
Payer: MEDICARE

## 2024-10-21 ENCOUNTER — ANESTHESIA EVENT (OUTPATIENT)
Dept: OPERATING ROOM | Age: 84
End: 2024-10-21
Payer: MEDICARE

## 2024-10-21 ENCOUNTER — APPOINTMENT (OUTPATIENT)
Dept: GENERAL RADIOLOGY | Age: 84
DRG: 481 | End: 2024-10-21
Payer: MEDICARE

## 2024-10-21 LAB — GLUCOSE BLD-MCNC: 103 MG/DL (ref 74–99)

## 2024-10-21 PROCEDURE — C1769 GUIDE WIRE: HCPCS | Performed by: ORTHOPAEDIC SURGERY

## 2024-10-21 PROCEDURE — 2720000010 HC SURG SUPPLY STERILE: Performed by: ORTHOPAEDIC SURGERY

## 2024-10-21 PROCEDURE — 0QS604Z REPOSITION RIGHT UPPER FEMUR WITH INTERNAL FIXATION DEVICE, OPEN APPROACH: ICD-10-PCS | Performed by: ORTHOPAEDIC SURGERY

## 2024-10-21 PROCEDURE — 7100000001 HC PACU RECOVERY - ADDTL 15 MIN: Performed by: ORTHOPAEDIC SURGERY

## 2024-10-21 PROCEDURE — 6370000000 HC RX 637 (ALT 250 FOR IP): Performed by: FAMILY MEDICINE

## 2024-10-21 PROCEDURE — 3700000000 HC ANESTHESIA ATTENDED CARE: Performed by: ORTHOPAEDIC SURGERY

## 2024-10-21 PROCEDURE — C1713 ANCHOR/SCREW BN/BN,TIS/BN: HCPCS | Performed by: ORTHOPAEDIC SURGERY

## 2024-10-21 PROCEDURE — 2580000003 HC RX 258

## 2024-10-21 PROCEDURE — 6360000002 HC RX W HCPCS: Performed by: NURSE ANESTHETIST, CERTIFIED REGISTERED

## 2024-10-21 PROCEDURE — 6360000002 HC RX W HCPCS

## 2024-10-21 PROCEDURE — 3600000004 HC SURGERY LEVEL 4 BASE: Performed by: ORTHOPAEDIC SURGERY

## 2024-10-21 PROCEDURE — 7100000000 HC PACU RECOVERY - FIRST 15 MIN: Performed by: ORTHOPAEDIC SURGERY

## 2024-10-21 PROCEDURE — 3700000001 HC ADD 15 MINUTES (ANESTHESIA): Performed by: ORTHOPAEDIC SURGERY

## 2024-10-21 PROCEDURE — 27245 TREAT THIGH FRACTURE: CPT | Performed by: ORTHOPAEDIC SURGERY

## 2024-10-21 PROCEDURE — 3600000014 HC SURGERY LEVEL 4 ADDTL 15MIN: Performed by: ORTHOPAEDIC SURGERY

## 2024-10-21 PROCEDURE — 2060000000 HC ICU INTERMEDIATE R&B

## 2024-10-21 PROCEDURE — 2709999900 HC NON-CHARGEABLE SUPPLY: Performed by: ORTHOPAEDIC SURGERY

## 2024-10-21 PROCEDURE — 82962 GLUCOSE BLOOD TEST: CPT

## 2024-10-21 PROCEDURE — 2500000003 HC RX 250 WO HCPCS: Performed by: NURSE ANESTHETIST, CERTIFIED REGISTERED

## 2024-10-21 PROCEDURE — 6360000002 HC RX W HCPCS: Performed by: STUDENT IN AN ORGANIZED HEALTH CARE EDUCATION/TRAINING PROGRAM

## 2024-10-21 PROCEDURE — 2580000003 HC RX 258: Performed by: STUDENT IN AN ORGANIZED HEALTH CARE EDUCATION/TRAINING PROGRAM

## 2024-10-21 PROCEDURE — 2580000003 HC RX 258: Performed by: NURSE ANESTHETIST, CERTIFIED REGISTERED

## 2024-10-21 PROCEDURE — 6360000002 HC RX W HCPCS: Performed by: FAMILY MEDICINE

## 2024-10-21 DEVICE — LONG NAIL, RIGHT
Type: IMPLANTABLE DEVICE | Site: HIP | Status: FUNCTIONAL
Brand: GAMMA

## 2024-10-21 DEVICE — LOCKING SCREW
Type: IMPLANTABLE DEVICE | Site: HIP | Status: FUNCTIONAL
Brand: T2 ALPHA

## 2024-10-21 DEVICE — LAG SCREW
Type: IMPLANTABLE DEVICE | Site: HIP | Status: FUNCTIONAL
Brand: GAMMA

## 2024-10-21 DEVICE — K-WIRE
Type: IMPLANTABLE DEVICE | Site: HIP | Status: FUNCTIONAL
Brand: GAMMA

## 2024-10-21 RX ORDER — KETOROLAC TROMETHAMINE 15 MG/ML
15 INJECTION, SOLUTION INTRAMUSCULAR; INTRAVENOUS ONCE
Status: DISCONTINUED | OUTPATIENT
Start: 2024-10-21 | End: 2024-10-21 | Stop reason: HOSPADM

## 2024-10-21 RX ORDER — MECOBALAMIN 5000 MCG
5 TABLET,DISINTEGRATING ORAL EVERY EVENING
Status: DISCONTINUED | OUTPATIENT
Start: 2024-10-21 | End: 2024-10-28 | Stop reason: HOSPADM

## 2024-10-21 RX ORDER — ROCURONIUM BROMIDE 10 MG/ML
INJECTION, SOLUTION INTRAVENOUS
Status: DISCONTINUED | OUTPATIENT
Start: 2024-10-21 | End: 2024-10-21 | Stop reason: SDUPTHER

## 2024-10-21 RX ORDER — NEOSTIGMINE METHYLSULFATE 1 MG/ML
INJECTION, SOLUTION INTRAVENOUS
Status: DISCONTINUED | OUTPATIENT
Start: 2024-10-21 | End: 2024-10-21 | Stop reason: SDUPTHER

## 2024-10-21 RX ORDER — HYDROCODONE BITARTRATE AND ACETAMINOPHEN 5; 325 MG/1; MG/1
1 TABLET ORAL EVERY 6 HOURS PRN
Qty: 28 TABLET | Refills: 0 | Status: SHIPPED | OUTPATIENT
Start: 2024-10-21 | End: 2024-10-25 | Stop reason: HOSPADM

## 2024-10-21 RX ORDER — HYDRALAZINE HYDROCHLORIDE 20 MG/ML
10 INJECTION INTRAMUSCULAR; INTRAVENOUS
Status: DISCONTINUED | OUTPATIENT
Start: 2024-10-21 | End: 2024-10-21 | Stop reason: HOSPADM

## 2024-10-21 RX ORDER — VENLAFAXINE 37.5 MG/1
37.5 TABLET ORAL DAILY
Status: DISCONTINUED | OUTPATIENT
Start: 2024-10-21 | End: 2024-10-24

## 2024-10-21 RX ORDER — SODIUM CHLORIDE 0.9 % (FLUSH) 0.9 %
5-40 SYRINGE (ML) INJECTION PRN
Status: DISCONTINUED | OUTPATIENT
Start: 2024-10-21 | End: 2024-10-21 | Stop reason: HOSPADM

## 2024-10-21 RX ORDER — PRENATAL WITH FERROUS FUM AND FOLIC ACID 3080; 920; 120; 400; 22; 1.84; 3; 20; 10; 1; 12; 200; 27; 25; 2 [IU]/1; [IU]/1; MG/1; [IU]/1; MG/1; MG/1; MG/1; MG/1; MG/1; MG/1; UG/1; MG/1; MG/1; MG/1; MG/1
1 TABLET ORAL DAILY
Status: DISCONTINUED | OUTPATIENT
Start: 2024-10-21 | End: 2024-10-28 | Stop reason: HOSPADM

## 2024-10-21 RX ORDER — PROPOFOL 10 MG/ML
INJECTION, EMULSION INTRAVENOUS
Status: DISCONTINUED | OUTPATIENT
Start: 2024-10-21 | End: 2024-10-21 | Stop reason: SDUPTHER

## 2024-10-21 RX ORDER — SODIUM CHLORIDE 0.9 % (FLUSH) 0.9 %
5-40 SYRINGE (ML) INJECTION EVERY 12 HOURS SCHEDULED
Status: DISCONTINUED | OUTPATIENT
Start: 2024-10-21 | End: 2024-10-21 | Stop reason: HOSPADM

## 2024-10-21 RX ORDER — ONDANSETRON 2 MG/ML
INJECTION INTRAMUSCULAR; INTRAVENOUS
Status: DISCONTINUED | OUTPATIENT
Start: 2024-10-21 | End: 2024-10-21 | Stop reason: SDUPTHER

## 2024-10-21 RX ORDER — DIPHENHYDRAMINE HYDROCHLORIDE 50 MG/ML
12.5 INJECTION INTRAMUSCULAR; INTRAVENOUS
Status: DISCONTINUED | OUTPATIENT
Start: 2024-10-21 | End: 2024-10-21 | Stop reason: HOSPADM

## 2024-10-21 RX ORDER — DROPERIDOL 2.5 MG/ML
0.62 INJECTION, SOLUTION INTRAMUSCULAR; INTRAVENOUS
Status: DISCONTINUED | OUTPATIENT
Start: 2024-10-21 | End: 2024-10-21 | Stop reason: HOSPADM

## 2024-10-21 RX ORDER — HYDROXYZINE PAMOATE 25 MG/1
25 CAPSULE ORAL 3 TIMES DAILY PRN
Status: DISCONTINUED | OUTPATIENT
Start: 2024-10-21 | End: 2024-10-22

## 2024-10-21 RX ORDER — FENTANYL CITRATE 50 UG/ML
INJECTION, SOLUTION INTRAMUSCULAR; INTRAVENOUS
Status: DISCONTINUED | OUTPATIENT
Start: 2024-10-21 | End: 2024-10-21 | Stop reason: SDUPTHER

## 2024-10-21 RX ORDER — ACETAMINOPHEN 500 MG
500 TABLET ORAL EVERY 6 HOURS PRN
Status: DISCONTINUED | OUTPATIENT
Start: 2024-10-21 | End: 2024-10-28 | Stop reason: HOSPADM

## 2024-10-21 RX ORDER — SODIUM CHLORIDE 9 MG/ML
INJECTION, SOLUTION INTRAVENOUS
Status: DISCONTINUED | OUTPATIENT
Start: 2024-10-21 | End: 2024-10-21 | Stop reason: SDUPTHER

## 2024-10-21 RX ORDER — ATORVASTATIN CALCIUM 40 MG/1
40 TABLET, FILM COATED ORAL NIGHTLY
Status: DISCONTINUED | OUTPATIENT
Start: 2024-10-21 | End: 2024-10-28 | Stop reason: HOSPADM

## 2024-10-21 RX ORDER — GLYCOPYRROLATE 0.2 MG/ML
INJECTION INTRAMUSCULAR; INTRAVENOUS
Status: DISCONTINUED | OUTPATIENT
Start: 2024-10-21 | End: 2024-10-21 | Stop reason: SDUPTHER

## 2024-10-21 RX ORDER — LORAZEPAM 2 MG/ML
1 INJECTION INTRAMUSCULAR EVERY 6 HOURS PRN
Status: DISCONTINUED | OUTPATIENT
Start: 2024-10-21 | End: 2024-10-22

## 2024-10-21 RX ORDER — IPRATROPIUM BROMIDE AND ALBUTEROL SULFATE 2.5; .5 MG/3ML; MG/3ML
1 SOLUTION RESPIRATORY (INHALATION)
Status: DISCONTINUED | OUTPATIENT
Start: 2024-10-21 | End: 2024-10-21 | Stop reason: HOSPADM

## 2024-10-21 RX ORDER — DIVALPROEX SODIUM 125 MG/1
250 CAPSULE, COATED PELLETS ORAL 3 TIMES DAILY
Status: DISCONTINUED | OUTPATIENT
Start: 2024-10-21 | End: 2024-10-23

## 2024-10-21 RX ORDER — SODIUM CHLORIDE 9 MG/ML
INJECTION, SOLUTION INTRAVENOUS PRN
Status: DISCONTINUED | OUTPATIENT
Start: 2024-10-21 | End: 2024-10-21 | Stop reason: HOSPADM

## 2024-10-21 RX ORDER — LABETALOL HYDROCHLORIDE 5 MG/ML
10 INJECTION, SOLUTION INTRAVENOUS
Status: DISCONTINUED | OUTPATIENT
Start: 2024-10-21 | End: 2024-10-21 | Stop reason: HOSPADM

## 2024-10-21 RX ORDER — ALPRAZOLAM 0.25 MG/1
0.25 TABLET ORAL EVERY 12 HOURS PRN
Status: DISCONTINUED | OUTPATIENT
Start: 2024-10-21 | End: 2024-10-22

## 2024-10-21 RX ORDER — PROCHLORPERAZINE EDISYLATE 5 MG/ML
5 INJECTION INTRAMUSCULAR; INTRAVENOUS
Status: DISCONTINUED | OUTPATIENT
Start: 2024-10-21 | End: 2024-10-21 | Stop reason: HOSPADM

## 2024-10-21 RX ORDER — NALOXONE HYDROCHLORIDE 0.4 MG/ML
INJECTION, SOLUTION INTRAMUSCULAR; INTRAVENOUS; SUBCUTANEOUS PRN
Status: DISCONTINUED | OUTPATIENT
Start: 2024-10-21 | End: 2024-10-21 | Stop reason: HOSPADM

## 2024-10-21 RX ORDER — DEXAMETHASONE SODIUM PHOSPHATE 10 MG/ML
INJECTION, SOLUTION INTRAMUSCULAR; INTRAVENOUS
Status: DISCONTINUED | OUTPATIENT
Start: 2024-10-21 | End: 2024-10-21 | Stop reason: SDUPTHER

## 2024-10-21 RX ORDER — CALCIUM CARBONATE 500(1250)
500 TABLET ORAL DAILY
Status: DISCONTINUED | OUTPATIENT
Start: 2024-10-21 | End: 2024-10-28 | Stop reason: HOSPADM

## 2024-10-21 RX ORDER — LIDOCAINE HYDROCHLORIDE 20 MG/ML
INJECTION, SOLUTION INTRAVENOUS
Status: DISCONTINUED | OUTPATIENT
Start: 2024-10-21 | End: 2024-10-21 | Stop reason: SDUPTHER

## 2024-10-21 RX ORDER — DONEPEZIL HYDROCHLORIDE 5 MG/1
10 TABLET, FILM COATED ORAL 2 TIMES DAILY
Status: DISCONTINUED | OUTPATIENT
Start: 2024-10-21 | End: 2024-10-22

## 2024-10-21 RX ADMIN — MORPHINE SULFATE 2 MG: 2 INJECTION, SOLUTION INTRAMUSCULAR; INTRAVENOUS at 04:24

## 2024-10-21 RX ADMIN — WATER 2000 MG: 1 INJECTION INTRAMUSCULAR; INTRAVENOUS; SUBCUTANEOUS at 21:47

## 2024-10-21 RX ADMIN — MORPHINE SULFATE 2 MG: 2 INJECTION, SOLUTION INTRAMUSCULAR; INTRAVENOUS at 15:54

## 2024-10-21 RX ADMIN — FENTANYL CITRATE 50 MCG: 50 INJECTION, SOLUTION INTRAMUSCULAR; INTRAVENOUS at 18:20

## 2024-10-21 RX ADMIN — PHENYLEPHRINE HYDROCHLORIDE 150 MCG: 10 INJECTION INTRAVENOUS at 18:35

## 2024-10-21 RX ADMIN — SODIUM CHLORIDE: 9 INJECTION, SOLUTION INTRAVENOUS at 18:57

## 2024-10-21 RX ADMIN — PROPOFOL 110 MG: 10 INJECTION, EMULSION INTRAVENOUS at 17:38

## 2024-10-21 RX ADMIN — LORAZEPAM 1 MG: 2 INJECTION INTRAMUSCULAR; INTRAVENOUS at 09:44

## 2024-10-21 RX ADMIN — PROPOFOL 50 MG: 10 INJECTION, EMULSION INTRAVENOUS at 18:55

## 2024-10-21 RX ADMIN — DEXAMETHASONE SODIUM PHOSPHATE 10 MG: 10 INJECTION INTRAMUSCULAR; INTRAVENOUS at 17:58

## 2024-10-21 RX ADMIN — CEFAZOLIN 2000 MG: 2 INJECTION, POWDER, FOR SOLUTION INTRAMUSCULAR; INTRAVENOUS at 17:44

## 2024-10-21 RX ADMIN — MORPHINE SULFATE 2 MG: 2 INJECTION, SOLUTION INTRAMUSCULAR; INTRAVENOUS at 08:16

## 2024-10-21 RX ADMIN — FENTANYL CITRATE 50 MCG: 50 INJECTION, SOLUTION INTRAMUSCULAR; INTRAVENOUS at 17:38

## 2024-10-21 RX ADMIN — PHENYLEPHRINE HYDROCHLORIDE 200 MCG: 10 INJECTION INTRAVENOUS at 17:43

## 2024-10-21 RX ADMIN — GLYCOPYRROLATE 0.4 MG: 0.2 INJECTION INTRAMUSCULAR; INTRAVENOUS at 18:53

## 2024-10-21 RX ADMIN — ONDANSETRON 4 MG: 2 INJECTION, SOLUTION INTRAMUSCULAR; INTRAVENOUS at 18:56

## 2024-10-21 RX ADMIN — SODIUM CHLORIDE: 9 INJECTION, SOLUTION INTRAVENOUS at 17:29

## 2024-10-21 RX ADMIN — PHENYLEPHRINE HYDROCHLORIDE 200 MCG: 10 INJECTION INTRAVENOUS at 18:50

## 2024-10-21 RX ADMIN — LIDOCAINE HYDROCHLORIDE 100 MG: 20 INJECTION, SOLUTION INTRAVENOUS at 17:38

## 2024-10-21 RX ADMIN — PHENYLEPHRINE HYDROCHLORIDE 150 MCG: 10 INJECTION INTRAVENOUS at 18:45

## 2024-10-21 RX ADMIN — ROCURONIUM BROMIDE 35 MG: 10 INJECTION, SOLUTION INTRAVENOUS at 17:39

## 2024-10-21 RX ADMIN — Medication 3 MG: at 18:53

## 2024-10-21 ASSESSMENT — PAIN SCALES - GENERAL
PAINLEVEL_OUTOF10: 3
PAINLEVEL_OUTOF10: 7
PAINLEVEL_OUTOF10: 8
PAINLEVEL_OUTOF10: 0
PAINLEVEL_OUTOF10: 10

## 2024-10-21 ASSESSMENT — PAIN DESCRIPTION - ORIENTATION
ORIENTATION: RIGHT

## 2024-10-21 ASSESSMENT — PAIN DESCRIPTION - LOCATION
LOCATION: HIP

## 2024-10-21 ASSESSMENT — PAIN DESCRIPTION - DESCRIPTORS
DESCRIPTORS: ACHING;DISCOMFORT;SHARP
DESCRIPTORS: SHARP

## 2024-10-21 ASSESSMENT — PAIN - FUNCTIONAL ASSESSMENT
PAIN_FUNCTIONAL_ASSESSMENT: PREVENTS OR INTERFERES SOME ACTIVE ACTIVITIES AND ADLS
PAIN_FUNCTIONAL_ASSESSMENT: PREVENTS OR INTERFERES SOME ACTIVE ACTIVITIES AND ADLS

## 2024-10-21 NOTE — PROGRESS NOTES
10/21/24 1233   Encounter Summary   Encounter Overview/Reason Loneliness/Social Isolation   Service Provided For Patient   Referral/Consult From [facility] [place]   Support System Children   Last Encounter  10/21/24  (dl)   Complexity of Encounter Moderate   Spiritual/Emotional needs   Type Spiritual Support   Assessment/Intervention/Outcome   Assessment Unable to assess   Intervention Prayer (assurance of)/[place]     Maureen is currently unable to engage and her daughters are due to arrive today from out of town.  available for additional spiritual support as needed.  Zoë Dominguez, MAPC, BCC Contact at Ext: [phone]

## 2024-10-21 NOTE — H&P
- 146 mmol/L    Potassium 4.2 3.5 - 5.0 mmol/L    Chloride 101 98 - 107 mmol/L    CO2 29 22 - 29 mmol/L    Anion Gap 9 7 - 16 mmol/L    Glucose 112 (H) 74 - 99 mg/dL    BUN 12 6 - 23 mg/dL    Creatinine 0.6 0.50 - 1.00 mg/dL    Est, Glom Filt Rate 90 >60 mL/min/1.73m2    Calcium 8.7 8.6 - 10.2 mg/dL    Total Protein 6.6 6.4 - 8.3 g/dL    Albumin 3.6 3.5 - 5.2 g/dL    Total Bilirubin 0.6 0.0 - 1.2 mg/dL    Alkaline Phosphatase 244 (H) 35 - 104 U/L    ALT 71 (H) 0 - 32 U/L     (H) 0 - 31 U/L   APTT    Collection Time: 10/20/24  9:30 PM   Result Value Ref Range    APTT 33.2 24.5 - 35.1 sec   CBC    Collection Time: 10/20/24  9:30 PM   Result Value Ref Range    WBC 11.1 4.5 - 11.5 k/uL    RBC 4.65 3.50 - 5.50 m/uL    Hemoglobin 14.4 11.5 - 15.5 g/dL    Hematocrit 44.9 34.0 - 48.0 %    MCV 96.6 80.0 - 99.9 fL    MCH 31.0 26.0 - 35.0 pg    MCHC 32.1 32.0 - 34.5 g/dL    RDW 13.6 11.5 - 15.0 %    Platelets 144 130 - 450 k/uL    MPV 11.9 7.0 - 12.0 fL        Imaging/Diagnostics Last 24 Hours   CT Head W/O Contrast    Result Date: 10/20/2024  EXAMINATION: CT OF THE HEAD WITHOUT CONTRAST  10/20/2024 6:15 pm TECHNIQUE: CT of the head was performed without the administration of intravenous contrast. Automated exposure control, iterative reconstruction, and/or weight based adjustment of the mA/kV was utilized to reduce the radiation dose to as low as reasonably achievable. COMPARISON: 09/04/2024 HISTORY: ORDERING SYSTEM PROVIDED HISTORY: Fall on xarelto TECHNOLOGIST PROVIDED HISTORY: Has a \"code stroke\" or \"stroke alert\" been called?->No Reason for exam:->Fall on xarelto Decision Support Exception - unselect if not a suspected or confirmed emergency medical condition->Emergency Medical Condition (MA) FINDINGS: BRAIN/VENTRICLES: There is no acute intracranial hemorrhage, mass effect or midline shift.  No abnormal extra-axial fluid collection.  The gray-white differentiation is maintained without evidence of an acute  infarct.  There is no evidence of hydrocephalus. Age related changes of brain volume loss and nonspecific white matter hypodensity most often ascribed to chronic small vessel ischemia.  Atherosclerotic vascular calcifications. ORBITS: The visualized portion of the orbits demonstrate no acute abnormality. SINUSES: The visualized paranasal sinuses and mastoid air cells demonstrate no acute abnormality. SOFT TISSUES/SKULL:  No acute abnormality of the visualized skull or soft tissues.     No evidence of acute intracranial hemorrhage or mass effect.     XR HIP 2-3 VW W PELVIS RIGHT    Result Date: 10/20/2024  EXAMINATION: ONE XRAY VIEW OF THE PELVIS AND TWO XRAY VIEWS RIGHT HIP 10/20/2024 6:19 pm COMPARISON: None. HISTORY: ORDERING SYSTEM PROVIDED HISTORY: hip dislocation/fx eval TECHNOLOGIST PROVIDED HISTORY: Reason for exam:->hip dislocation/fx eval FINDINGS: See impression.  Osteopenia.  ASVD.  IVC filter.  Degenerative changes lumbar spine and bilateral hips.  Old left pubic rami fractures.     1. Intertrochanteric fracture, slight varus angulation.     XR FEMUR RIGHT (MIN 2 VIEWS)    Result Date: 10/20/2024  EXAMINATION: 2 XRAY VIEWS OF THE RIGHT FEMUR 10/20/2024 6:19 pm COMPARISON: None. HISTORY: ORDERING SYSTEM PROVIDED HISTORY: fall TECHNOLOGIST PROVIDED HISTORY: Reason for exam:->fall FINDINGS: Atherosclerotic vascular calcifications.  Osteopenia, which decreases the sensitivity for detection of subtle or nondisplaced fractures. Consider short-term follow-up radiographs or further investigation with CT/MRI as clinically indicated.  Total knee arthroplasty.  No periprosthetic fracture or lucency.     No radiographic evidence for fracture.     XR CHEST 1 VIEW    Result Date: 10/20/2024  EXAMINATION: ONE XRAY VIEW OF THE CHEST 10/20/2024 6:19 pm COMPARISON: 07/20/2024 HISTORY: ORDERING SYSTEM PROVIDED HISTORY: Fall TECHNOLOGIST PROVIDED HISTORY: Reason for exam:->Fall FINDINGS: Upper abdominal surgical material.

## 2024-10-21 NOTE — PROGRESS NOTES
Spoke to daughter who is inquiring about her mother and what testing revealed. Did let her know that her mother sustained a right hip fracture and that she would be having surgery tomorrow. She is agreeable to this plan and states that she lives out of town, but will be coming here tomorrow. Encouraged her to call the unit if she has any further questions.   Verbal consent received via phone from Aliza Barraza- daughter for surgery. Witnessed by this nurse and another RN- Jarad Muniz. Consents signed and placed in chart.

## 2024-10-21 NOTE — ACP (ADVANCE CARE PLANNING)
Advance Care Planning   Healthcare Decision Maker:    Primary Decision Maker: Rebecca Mendoza - Child - [phone]    Secondary Decision Maker: Aliza Barraza [facility] Child - [phone]    Click here to complete Healthcare Decision Makers including selection of the Healthcare Decision Maker Relationship (ie \"Primary\").  Today we documented Decision Maker(s) consistent with ACP documents on file.

## 2024-10-21 NOTE — PROGRESS NOTES
Pt refused all PO meds.  This RN attemopted to give scheduled PO meds, pt spit out first pill and threw it on the ground.  When asked why she did that she stated \"Because I can't take it\":

## 2024-10-21 NOTE — ANESTHESIA PRE PROCEDURE
Department of Anesthesiology  Preprocedure Note       Name:  Maureen Collier   Age:  84 y.o.  :  1940                                          MRN:  [id number]         Date:  10/21/2024      Surgeon: Surgeon(s):  ANETA Martinez DO    Procedure: Procedure(s):  TOTAL RIGHT KNEE REPLACEMENT ARTHROPLASTY (JOLYNN)    Medications prior to admission:   Prior to Admission medications    Medication Sig Start Date End Date Taking? Authorizing Provider   calcium carbonate (OSCAL) 500 MG TABS tablet Take 1 tablet by mouth daily   Yes ProviderNieves MD   ALPRAZolam (XANAX) 0.25 MG tablet Take 1 tablet by mouth every 12 hours as needed for Anxiety. Max Daily Amount: 0.5 mg   Yes ProviderNieves MD   sertraline (ZOLOFT) 50 MG tablet Take 1 tablet by mouth daily   Yes ProviderNieves MD   sertraline (ZOLOFT) 25 MG tablet Take 1 tablet by mouth daily   Yes ProviderNieves MD   venlafaxine (EFFEXOR) 37.5 MG tablet Take 1 tablet by mouth daily   Yes ProviderNieves MD   venlafaxine (EFFEXOR) 75 MG tablet Take 1 tablet by mouth daily   Yes Provider, MD Nieves   divalproex (DEPAKOTE SPRINKLE) 125 MG DR capsule Take 2 capsules by mouth 2 times daily  Patient taking differently: Take 2 capsules by mouth 3 times daily 24  Yes Anson Cross DO   donepezil (ARICEPT) 10 MG tablet Take 1 tablet by mouth 2 times daily   Yes ProviderNieves MD   melatonin 5 MG TBDP disintegrating tablet Take 1 tablet by mouth every evening 23  Yes Capo Cm MD   rivaroxaban (XARELTO) 20 MG TABS tablet Take 1 tablet by mouth daily (with breakfast) 19  Yes Dariela Carreon MD   Prenatal Vit-Fe Fumarate-FA (PREPLUS) 27-1 MG TABS Take 1 tablet by mouth daily   Yes ProviderNieves MD   atorvastatin (LIPITOR) 40 MG tablet Take 1 tablet by mouth nightly 17  Yes Jersey Dudley MD   hydrOXYzine pamoate (VISTARIL) 25 MG capsule Take 1 capsule by mouth 3 times daily as needed for

## 2024-10-21 NOTE — PLAN OF CARE
Problem: Chronic Conditions and Co-morbidities  Goal: Patient's chronic conditions and co-morbidity symptoms are monitored and maintained or improved  10/21/2024 0938 by Kate Liz RN  Outcome: Progressing     Problem: Discharge Planning  Goal: Discharge to home or other facility with appropriate resources  10/21/2024 0938 by Kate Liz, RN  Outcome: Progressing     Problem: Skin/Tissue Integrity  Goal: Absence of new skin breakdown  Description: 1.  Monitor for areas of redness and/or skin breakdown  2.  Assess vascular access sites hourly  3.  Every 4-6 hours minimum:  Change oxygen saturation probe site  4.  Every 4-6 hours:  If on nasal continuous positive airway pressure, respiratory therapy assess nares and determine need for appliance change or resting period.  10/21/2024 0938 by Kate Liz RN  Outcome: Progressing     Problem: Safety - Adult  Goal: Free from fall injury  10/21/2024 0938 by Kate Liz RN  Outcome: Progressing

## 2024-10-21 NOTE — PROGRESS NOTES
4 Eyes Skin Assessment     NAME:  Maureen Collier  YOB: 1940  MEDICAL RECORD NUMBER:  [id number]    The patient is being assessed for  Admission    I agree that at least one RN has performed a thorough Head to Toe Skin Assessment on the patient. ALL assessment sites listed below have been assessed.      Areas assessed by both nurses:    Head, Face, Ears, Shoulders, Back, Chest, Arms, Elbows, Hands, Sacrum. Buttock, Coccyx, Ischium, Legs. Feet and Heels, and Under Medical Devices         Does the Patient have a Wound? No noted wound(s)       Naseem Prevention initiated by RN: Yes  Wound Care Orders initiated by RN: Yes    Pressure Injury (Stage 3,4, Unstageable, DTI, NWPT, and Complex wounds) if present, place Wound referral order by RN under : No    New Ostomies, if present place, Ostomy referral order under : No     Nurse 1 eSignature: Electronically signed by Kate Liz RN on 10/21/24 at 1:05 PM EDT    **SHARE this note so that the co-signing nurse can place an eSignature**    Nurse 2 eSignature: Electronically signed by Yina Swann RN on 10/21/24 at 1:08 PM EDT

## 2024-10-21 NOTE — OP NOTE
Operative Note      Patient: Maureen Collier  YOB: 1940  MRN: [id number]    Date of Procedure: 10/21/2024    Pre-Op Diagnosis Codes:      * Displaced intertrochanteric fracture of right femur, initial encounter for closed fracture (HCC) [S72.141A]    Post-Op Diagnosis: Same       Procedure(s):  RIGHT INTERTROCHANTERIC HIP OPEN REDUCTION INTERNAL FIXATION    Surgeon(s):  ANETA Martinez DO    Assistant:   Resident: Nixon Valderrama DO    Anesthesia: General    Estimated Blood Loss (mL): 200     Complications: None    Specimens:   * No specimens in log *    Implants:  Implant Name Type Inv. Item Serial No.  Lot No. LRB No. Used Action   NAIL  DEG L 340 MM DIA13 MM LNG TI ALLOY RT HIP STRL - [id number]  NAIL  DEG L 340 MM DIA13 MM LNG TI ALLOY RT HIP STRL  [employer] Epom [id number] Right 1 Implanted   SCREW LAG GAMMA 10.5X95MM - [id number]  SCREW LAG GAMMA 10.5X95MM  [employer] Epom [id number] Right 1 Implanted   SCREW BNE L475MM DIA5MM STEPHANIE FOR T2 ALPHA NAILING SYS - [id number]  SCREW BNE L475MM DIA5MM STEPHANIE FOR T2 ALPHA NAILING SYS  [employer] Epom [id number] Right 1 Implanted         Drains:   [REMOVED] External Urinary Catheter (Removed)   Site Assessment Clean,dry & intact;Pink 09/23/24 2000   Placement Replaced 09/22/24 1307   Catheter Care Catheter/Wick replaced 09/22/24 1307   Perineal Care Yes 09/22/24 1307   Urine Color Yellow 09/22/24 1307   Urine Appearance Clear 09/22/24 1307   Urine Odor Malodorous 09/20/24 0609   Output (mL) 300 mL 09/24/24 1800       Findings:  Infection Present At Time Of Surgery (PATOS) (choose all levels that have infection present):  No infection present  Other Findings: Intertrochanteric fracture right proximal femur    Detailed Description of Procedure:   The patient is brought the operative room after site and side were identified.  Adequate general anesthetic was administered on the transport bed.

## 2024-10-21 NOTE — PROGRESS NOTES
Orthopedic progress note    Patient reportedly had a fall at nursing facility and was found to have a right intertrochanteric hip fracture.  Orthopedics was consulted.  Patient has a history of Alzheimer's dementia.  Patient is status post right total knee arthroplasty done by Dr. Martinez in 2018.    X-rays AP pelvis/right hip/femur reviewed demonstrating a right intertrochanteric hip fracture that is shortened externally rotated and varus angulation with some comminution.  Patient is status post right total knee arthroplasty.  Components appear to be well-maintained with no evidence of loosening or subsidence.  No fractures appreciated distally.  Patient does have significantly poor bone quality.  Patient does appear to have chronic cortical irregularities around her left inferior ramus.  No other acute findings appreciated.    X-ray AP test demonstrates no acute fracture dislocations about bilateral clavicles, bilateral SC joints reduced.  No fractures appreciated out the right proximal humerus, left proximal humerus not visualized.    Impression:  Closed, right intertrochanteric hip fracture    Plan:  Nonweightbearing to right lower extremity  N.p.o. effective midnight tonight 10/20  Hold anticoagulation  Procedure consent  Appreciate medical admission and surgical optimization  Pain control per admitting  Discuss with attending, plan for acute orthopedic surgical intervention on 10/21  Formal consult note to follow    Electronically signed by Panchito Angel DO on 10/20/2024 at 8:58 PM

## 2024-10-21 NOTE — DISCHARGE INSTRUCTIONS
Weight bear as tolerated on right lower extremity    Keep dressings over incisions for seven days post-operatively.  Remove on 10/28/24.  Replace with a clean, dry dressing if you still have drainage at that time    Take pain medications as prescribed    Resume Xarelto for prevention of blood clots    Follow-up with Dr. Martinez in his clinic in 2-3 weeks for post-operative evaluation and imaging, contact his office to confirm or make an appointment

## 2024-10-21 NOTE — CONSULTS
Department of Orthopedic Surgery  Resident Consult Note          Reason for Consult: Right intertrochanteric fracture    HISTORY OF PRESENT ILLNESS:       Patient is a 84 y.o. female who presents to the emergency department after a ground-level fall.  Patient states her nursing facility and has a history of severe Alzheimer's dementia.  Patient has a history of right total knee arthroplasty done by Dr. Martinez 2018.  Patient was unable to ambulate after her fall.  Due to her severe dementia it was difficult to obtain history as she was not able to answer many questions.  However she did have pain on exam about the right hip.  Denies numbness/tingling/paresthesias.  Denies any other orthopedic complaints at this time.      Past Medical History:        Diagnosis Date    Arthritis     Cerebral artery occlusion with cerebral infarction (HCC)     Depression     DVT (deep vein thrombosis) in pregnancy     DVT of lower extremity, bilateral (HCC)     GERD (gastroesophageal reflux disease)     Hx of blood clots     Hyperlipidemia     Hypertension     cured    Nephrolithiasis      Past Surgical History:        Procedure Laterality Date    ABDOMEN SURGERY  2007, 2008    tummy tuck x2    BLADDER SUSPENSION  2011    CHOLECYSTECTOMY, OPEN  3/21/2013    open cholecystectomy with intropertive cholangiogram    COLONOSCOPY      ENDOSCOPY, COLON, DIAGNOSTIC      FOOT SURGERY      GASTRIC BYPASS SURGERY  March, 2006    [facility]    HYSTERECTOMY (CERVIX STATUS UNKNOWN)  42 years ago    MS ARTHRP KNE CONDYLE&PLATU MEDIAL&LAT COMPARTMENTS N/A 7/31/2018    TOTAL RIGHT KNEE REPLACEMENT ARTHROPLASTY (JOLYNN) performed by ANETA Martinez DO at [facility] OR     Current Medications:   Current Facility-Administered Medications: morphine (PF) injection 2 mg, 2 mg, IntraVENous, Q4H PRN  Allergies:  Demerol hcl [meperidine] and Iv dye [iodides]    Social History:   TOBACCO:   reports that she has never smoked. She has never used

## 2024-10-22 LAB
ANION GAP SERPL CALCULATED.3IONS-SCNC: 8 MMOL/L (ref 7–16)
BUN SERPL-MCNC: 20 MG/DL (ref 6–23)
CALCIUM SERPL-MCNC: 9.2 MG/DL (ref 8.6–10.2)
CHLORIDE SERPL-SCNC: 104 MMOL/L (ref 98–107)
CO2 SERPL-SCNC: 30 MMOL/L (ref 22–29)
CREAT SERPL-MCNC: 0.7 MG/DL (ref 0.5–1)
GFR, ESTIMATED: 82 ML/MIN/1.73M2
GLUCOSE BLD-MCNC: 124 MG/DL (ref 74–99)
GLUCOSE SERPL-MCNC: 139 MG/DL (ref 74–99)
POTASSIUM SERPL-SCNC: 4.5 MMOL/L (ref 3.5–5)
SODIUM SERPL-SCNC: 142 MMOL/L (ref 132–146)

## 2024-10-22 PROCEDURE — 6370000000 HC RX 637 (ALT 250 FOR IP): Performed by: FAMILY MEDICINE

## 2024-10-22 PROCEDURE — 80048 BASIC METABOLIC PNL TOTAL CA: CPT

## 2024-10-22 PROCEDURE — 2060000000 HC ICU INTERMEDIATE R&B

## 2024-10-22 PROCEDURE — 2580000003 HC RX 258: Performed by: FAMILY MEDICINE

## 2024-10-22 PROCEDURE — 6360000002 HC RX W HCPCS: Performed by: FAMILY MEDICINE

## 2024-10-22 PROCEDURE — 6360000002 HC RX W HCPCS: Performed by: STUDENT IN AN ORGANIZED HEALTH CARE EDUCATION/TRAINING PROGRAM

## 2024-10-22 PROCEDURE — 36415 COLL VENOUS BLD VENIPUNCTURE: CPT

## 2024-10-22 PROCEDURE — 82962 GLUCOSE BLOOD TEST: CPT

## 2024-10-22 PROCEDURE — 2580000003 HC RX 258: Performed by: STUDENT IN AN ORGANIZED HEALTH CARE EDUCATION/TRAINING PROGRAM

## 2024-10-22 RX ORDER — HALOPERIDOL 0.5 MG/1
0.5 TABLET ORAL EVERY 6 HOURS PRN
Status: DISCONTINUED | OUTPATIENT
Start: 2024-10-22 | End: 2024-10-28 | Stop reason: HOSPADM

## 2024-10-22 RX ORDER — SODIUM CHLORIDE 9 MG/ML
INJECTION, SOLUTION INTRAVENOUS CONTINUOUS
Status: DISCONTINUED | OUTPATIENT
Start: 2024-10-22 | End: 2024-10-24

## 2024-10-22 RX ORDER — HYDROCODONE BITARTRATE AND ACETAMINOPHEN 5; 325 MG/1; MG/1
1 TABLET ORAL EVERY 6 HOURS PRN
Status: DISCONTINUED | OUTPATIENT
Start: 2024-10-22 | End: 2024-10-28 | Stop reason: HOSPADM

## 2024-10-22 RX ORDER — DONEPEZIL HYDROCHLORIDE 5 MG/1
10 TABLET, FILM COATED ORAL NIGHTLY
Status: DISCONTINUED | OUTPATIENT
Start: 2024-10-23 | End: 2024-10-28 | Stop reason: HOSPADM

## 2024-10-22 RX ADMIN — CALCIUM 500 MG: 500 TABLET ORAL at 08:18

## 2024-10-22 RX ADMIN — SERTRALINE 50 MG: 50 TABLET, FILM COATED ORAL at 08:18

## 2024-10-22 RX ADMIN — VENLAFAXINE 37.5 MG: 37.5 TABLET ORAL at 08:27

## 2024-10-22 RX ADMIN — WATER 2000 MG: 1 INJECTION INTRAMUSCULAR; INTRAVENOUS; SUBCUTANEOUS at 06:13

## 2024-10-22 RX ADMIN — SODIUM CHLORIDE: 9 INJECTION, SOLUTION INTRAVENOUS at 12:03

## 2024-10-22 RX ADMIN — LORAZEPAM 1 MG: 2 INJECTION INTRAMUSCULAR; INTRAVENOUS at 09:22

## 2024-10-22 RX ADMIN — DIVALPROEX SODIUM 250 MG: 125 CAPSULE, COATED PELLETS ORAL at 08:18

## 2024-10-22 RX ADMIN — PRENATAL WITH FERROUS FUM AND FOLIC ACID 1 TABLET: 3080; 920; 120; 400; 22; 1.84; 3; 20; 10; 1; 12; 200; 27; 25; 2 TABLET ORAL at 08:27

## 2024-10-22 RX ADMIN — MORPHINE SULFATE 2 MG: 2 INJECTION, SOLUTION INTRAMUSCULAR; INTRAVENOUS at 08:45

## 2024-10-22 RX ADMIN — DONEPEZIL HYDROCHLORIDE 10 MG: 5 TABLET, FILM COATED ORAL at 08:18

## 2024-10-22 RX ADMIN — ACETAMINOPHEN 500 MG: 500 TABLET ORAL at 15:58

## 2024-10-22 RX ADMIN — MORPHINE SULFATE 2 MG: 2 INJECTION, SOLUTION INTRAMUSCULAR; INTRAVENOUS at 17:08

## 2024-10-22 RX ADMIN — MORPHINE SULFATE 2 MG: 2 INJECTION, SOLUTION INTRAMUSCULAR; INTRAVENOUS at 02:21

## 2024-10-22 RX ADMIN — HYDROCODONE BITARTRATE AND ACETAMINOPHEN 1 TABLET: 5; 325 TABLET ORAL at 23:48

## 2024-10-22 ASSESSMENT — PAIN SCALES - GENERAL
PAINLEVEL_OUTOF10: 0
PAINLEVEL_OUTOF10: 10
PAINLEVEL_OUTOF10: 6
PAINLEVEL_OUTOF10: 5

## 2024-10-22 ASSESSMENT — PAIN DESCRIPTION - DESCRIPTORS
DESCRIPTORS: DISCOMFORT

## 2024-10-22 ASSESSMENT — PAIN DESCRIPTION - ORIENTATION
ORIENTATION: RIGHT
ORIENTATION: RIGHT

## 2024-10-22 ASSESSMENT — PAIN DESCRIPTION - LOCATION
LOCATION: HIP
LOCATION: OTHER (COMMENT)
LOCATION: HIP

## 2024-10-22 NOTE — ANESTHESIA POSTPROCEDURE EVALUATION
Department of Anesthesiology  Postprocedure Note    Patient: Maureen Collier  MRN: [id number]  YOB: 1940  Date of evaluation: 10/21/2024    Procedure Summary       Date: 10/21/24 Room / Location: [address]    Anesthesia Start: 1729 Anesthesia Stop: 1908    Procedure: RIGHT INTERTROCHANTERIC HIP OPEN REDUCTION INTERNAL FIXATION (Right: Hip) Diagnosis:       Displaced intertrochanteric fracture of right femur, initial encounter for closed fracture (HCC)      (Displaced intertrochanteric fracture of right femur, initial encounter for closed fracture (HCC) [S72.141A])    Surgeons: ANETA Martinez DO Responsible Provider: Jairo Parham MD    Anesthesia Type: general ASA Status: 4            Anesthesia Type: No value filed.    Deja Phase I: Deja Score: 10    Deja Phase II:      Anesthesia Post Evaluation    Patient location during evaluation: PACU  Patient participation: complete - patient cannot participate (Patient has dementia and is not capable of participate with examiners.)  Level of consciousness: awake  Pain scale: Patient has dementia and is not capable of participate with examiners.  Airway patency: patent  Nausea & Vomiting: no vomiting and no nausea  Cardiovascular status: hemodynamically stable  Respiratory status: acceptable  Hydration status: stable  Pain management: adequate    No notable events documented.

## 2024-10-22 NOTE — PLAN OF CARE
Problem: Chronic Conditions and Co-morbidities  Goal: Patient's chronic conditions and co-morbidity symptoms are monitored and maintained or improved  10/22/2024 0809 by Kate Liz RN  Outcome: Progressing     Problem: Discharge Planning  Goal: Discharge to home or other facility with appropriate resources  10/22/2024 0809 by Kate Liz RN  Outcome: Progressing     Problem: Skin/Tissue Integrity  Goal: Absence of new skin breakdown  Description: 1.  Monitor for areas of redness and/or skin breakdown  2.  Assess vascular access sites hourly  3.  Every 4-6 hours minimum:  Change oxygen saturation probe site  4.  Every 4-6 hours:  If on nasal continuous positive airway pressure, respiratory therapy assess nares and determine need for appliance change or resting period.  10/22/2024 0809 by Kate Liz RN  Outcome: Progressing     Problem: Safety - Adult  Goal: Free from fall injury  10/22/2024 0809 by Kate Liz RN  Outcome: Progressing     Problem: Pain  Goal: Verbalizes/displays adequate comfort level or baseline comfort level  10/22/2024 0809 by Kate Liz RN  Outcome: Progressing

## 2024-10-22 NOTE — PROGRESS NOTES
Department of Orthopedic Surgery  Resident Progress Note    Patient seen and examined.  Patient admitted at bedside.  Does not cooperate for exam.  Nursing states that she is been given Ativan for control of her agitation.    Per nursing, patient was also picking at her Aquacel dressings, these were reinforced    VITALS:  BP (!) 143/74   Pulse 89   Temp 99.1 °F (37.3 °C) (Axillary)   Resp 16   Ht 1.549 m (5' 1\")   Wt 59 kg (130 lb)   SpO2 98%   BMI 24.56 kg/m²     General: Awake, alert to self, no acute distress, does not cooperate with exam    MUSCULOSKELETAL:   Right lower extremity:  Saturation to proximal dressing contained within the borders, otherwise C/D/I  Compartments soft and compressible  Patient seen plantar and dorsiflexing toes and foot  +2/4 DP & PT pulses, Brisk Cap refill, Toes warm and perfused  Response to tactile stimuli in  Peroneals, Sural, Saphenous, and tibial nrs    CBC:   Lab Results   Component Value Date/Time    WBC 11.1 10/20/2024 09:30 PM    HGB 14.4 10/20/2024 09:30 PM    HCT 44.9 10/20/2024 09:30 PM     10/20/2024 09:30 PM     PT/INR:    Lab Results   Component Value Date/Time    PROTIME 14.4 10/20/2024 09:30 PM    INR 1.3 10/20/2024 09:30 PM         ASSESSMENT  S/P ORIF right intertrochanteric femur fracture with cephalomedullary nail- 10/21    PLAN      Continue physical therapy and protocol: WBAT - RLE  24 hour abx coverage  Deep venous thrombosis prophylaxis -defer to primary Hung while in house, recommend aspirin 81 twice daily once discharged, early mobilization  PT/OT  Pain Control: IV and PO  Monitor H&H  D/C Plan: Pending PT evaluation, likely SNF

## 2024-10-22 NOTE — PROGRESS NOTES
Progress Note  Date:10/22/2024       Room:22/0622-01  Patient Name:Maureen Collier     YOB: 1940     Age:84 y.o.        Subjective    Subjective:  Symptoms:  Stable.  She reports malaise, weakness and anorexia.    Diet:  Poor intake.  No nausea or vomiting.    Activity level: Impaired due to weakness.    Pain:  She reports no pain.       Review of Systems   Constitutional:  Negative for fever.   Gastrointestinal:  Positive for anorexia. Negative for nausea and vomiting.   Neurological:  Positive for weakness.   All other systems reviewed and are negative.    Objective         Vitals Last 24 Hours:  TEMPERATURE:  Temp  Av.2 °F (36.8 °C)  Min: 97.6 °F (36.4 °C)  Max: 99.1 °F (37.3 °C)  RESPIRATIONS RANGE: Resp  Avg: 15.7  Min: 12  Max: 20  PULSE OXIMETRY RANGE: SpO2  Av.4 %  Min: 96 %  Max: 100 %  PULSE RANGE: Pulse  Av.6  Min: 85  Max: 121  BLOOD PRESSURE RANGE: Systolic (24hrs), Av , Min:90 , Max:149   ; Diastolic (24hrs), Av, Min:69, Max:90    I/O (24Hr):    Intake/Output Summary (Last 24 hours) at 10/22/2024 1758  Last data filed at 10/22/2024 0815  Gross per 24 hour   Intake 1500 ml   Output 200 ml   Net 1300 ml     Objective:  General Appearance:  Ill-appearing.    Vital signs: (most recent): Blood pressure 130/74, pulse 85, temperature 97.6 °F (36.4 °C), resp. rate 16, height 1.549 m (5' 1\"), weight 59 kg (130 lb), SpO2 98%, not currently breastfeeding.  Vital signs are normal.  No fever.    Output: Producing urine and producing stool.    HEENT: Normal HEENT exam.    Lungs:  Normal effort and normal respiratory rate.    Heart: Normal rate.  Regular rhythm.    Abdomen: Abdomen is soft.    Extremities: Decreased range of motion.  There is local swelling.    Neurological: (somnolent).    Skin:  Warm and dry.      Labs/Imaging/Diagnostics    Labs:  CBC:  Recent Labs     10/20/24  2130   WBC 11.1   RBC 4.65   HGB 14.4   HCT 44.9   MCV 96.6   RDW 13.6     intracranial hemorrhage, mass effect or midline shift.  No abnormal extra-axial fluid collection.  The gray-white differentiation is maintained without evidence of an acute infarct.  There is no evidence of hydrocephalus. Age related changes of brain volume loss and nonspecific white matter hypodensity most often ascribed to chronic small vessel ischemia.  Atherosclerotic vascular calcifications. ORBITS: The visualized portion of the orbits demonstrate no acute abnormality. SINUSES: The visualized paranasal sinuses and mastoid air cells demonstrate no acute abnormality. SOFT TISSUES/SKULL:  No acute abnormality of the visualized skull or soft tissues.     No evidence of acute intracranial hemorrhage or mass effect.     XR HIP 2-3 VW W PELVIS RIGHT    Result Date: 10/20/2024  EXAMINATION: ONE XRAY VIEW OF THE PELVIS AND TWO XRAY VIEWS RIGHT HIP 10/20/2024 6:19 pm COMPARISON: None. HISTORY: ORDERING SYSTEM PROVIDED HISTORY: hip dislocation/fx eval TECHNOLOGIST PROVIDED HISTORY: Reason for exam:->hip dislocation/fx eval FINDINGS: See impression.  Osteopenia.  ASVD.  IVC filter.  Degenerative changes lumbar spine and bilateral hips.  Old left pubic rami fractures.     1. Intertrochanteric fracture, slight varus angulation.     XR FEMUR RIGHT (MIN 2 VIEWS)    Result Date: 10/20/2024  EXAMINATION: 2 XRAY VIEWS OF THE RIGHT FEMUR 10/20/2024 6:19 pm COMPARISON: None. HISTORY: ORDERING SYSTEM PROVIDED HISTORY: fall TECHNOLOGIST PROVIDED HISTORY: Reason for exam:->fall FINDINGS: Atherosclerotic vascular calcifications.  Osteopenia, which decreases the sensitivity for detection of subtle or nondisplaced fractures. Consider short-term follow-up radiographs or further investigation with CT/MRI as clinically indicated.  Total knee arthroplasty.  No periprosthetic fracture or lucency.     No radiographic evidence for fracture.     XR CHEST 1 VIEW    Result Date: 10/20/2024  EXAMINATION: ONE XRAY VIEW OF THE CHEST 10/20/2024 6:19

## 2024-10-22 NOTE — PROGRESS NOTES
Daughter, LUCIA, would like to be updated by physicians (dr. Martinez) on 10/22/2024 if not present when rounding @ [phone]

## 2024-10-23 LAB
ALBUMIN SERPL-MCNC: 2.9 G/DL (ref 3.5–5.2)
ALP SERPL-CCNC: 135 U/L (ref 35–104)
ALT SERPL-CCNC: 14 U/L (ref 0–32)
ANION GAP SERPL CALCULATED.3IONS-SCNC: 5 MMOL/L (ref 7–16)
AST SERPL-CCNC: 29 U/L (ref 0–31)
BASOPHILS # BLD: 0.01 K/UL (ref 0–0.2)
BASOPHILS NFR BLD: 0 % (ref 0–2)
BILIRUB SERPL-MCNC: 0.4 MG/DL (ref 0–1.2)
BUN SERPL-MCNC: 26 MG/DL (ref 6–23)
CALCIUM SERPL-MCNC: 9.1 MG/DL (ref 8.6–10.2)
CHLORIDE SERPL-SCNC: 107 MMOL/L (ref 98–107)
CO2 SERPL-SCNC: 30 MMOL/L (ref 22–29)
CREAT SERPL-MCNC: 1 MG/DL (ref 0.5–1)
DATE LAST DOSE: ABNORMAL
EOSINOPHIL # BLD: 0.02 K/UL (ref 0.05–0.5)
EOSINOPHILS RELATIVE PERCENT: 0 % (ref 0–6)
ERYTHROCYTE [DISTWIDTH] IN BLOOD BY AUTOMATED COUNT: 13.9 % (ref 11.5–15)
GFR, ESTIMATED: 59 ML/MIN/1.73M2
GLUCOSE SERPL-MCNC: 93 MG/DL (ref 74–99)
HCT VFR BLD AUTO: 32.6 % (ref 34–48)
HGB BLD-MCNC: 10.3 G/DL (ref 11.5–15.5)
IMM GRANULOCYTES # BLD AUTO: 0.03 K/UL (ref 0–0.58)
IMM GRANULOCYTES NFR BLD: 0 % (ref 0–5)
LYMPHOCYTES NFR BLD: 1.81 K/UL (ref 1.5–4)
LYMPHOCYTES RELATIVE PERCENT: 20 % (ref 20–42)
MCH RBC QN AUTO: 31.1 PG (ref 26–35)
MCHC RBC AUTO-ENTMCNC: 31.6 G/DL (ref 32–34.5)
MCV RBC AUTO: 98.5 FL (ref 80–99.9)
MONOCYTES NFR BLD: 0.71 K/UL (ref 0.1–0.95)
MONOCYTES NFR BLD: 8 % (ref 2–12)
NEUTROPHILS NFR BLD: 72 % (ref 43–80)
NEUTS SEG NFR BLD: 6.53 K/UL (ref 1.8–7.3)
PLATELET # BLD AUTO: 107 K/UL (ref 130–450)
PMV BLD AUTO: 12.5 FL (ref 7–12)
POTASSIUM SERPL-SCNC: 4.1 MMOL/L (ref 3.5–5)
PROT SERPL-MCNC: 5.3 G/DL (ref 6.4–8.3)
RBC # BLD AUTO: 3.31 M/UL (ref 3.5–5.5)
SODIUM SERPL-SCNC: 142 MMOL/L (ref 132–146)
TME LAST DOSE: ABNORMAL H
VALPROATE SERPL-MCNC: 14 UG/ML (ref 50–100)
VANCOMYCIN DOSE: ABNORMAL MG
WBC OTHER # BLD: 9.1 K/UL (ref 4.5–11.5)

## 2024-10-23 PROCEDURE — 97161 PT EVAL LOW COMPLEX 20 MIN: CPT | Performed by: PHYSICAL THERAPIST

## 2024-10-23 PROCEDURE — 85025 COMPLETE CBC W/AUTO DIFF WBC: CPT

## 2024-10-23 PROCEDURE — 97530 THERAPEUTIC ACTIVITIES: CPT | Performed by: PHYSICAL THERAPIST

## 2024-10-23 PROCEDURE — 36415 COLL VENOUS BLD VENIPUNCTURE: CPT

## 2024-10-23 PROCEDURE — 97530 THERAPEUTIC ACTIVITIES: CPT

## 2024-10-23 PROCEDURE — 80164 ASSAY DIPROPYLACETIC ACD TOT: CPT

## 2024-10-23 PROCEDURE — 2060000000 HC ICU INTERMEDIATE R&B

## 2024-10-23 PROCEDURE — 97165 OT EVAL LOW COMPLEX 30 MIN: CPT

## 2024-10-23 PROCEDURE — 6370000000 HC RX 637 (ALT 250 FOR IP): Performed by: FAMILY MEDICINE

## 2024-10-23 PROCEDURE — 80053 COMPREHEN METABOLIC PANEL: CPT

## 2024-10-23 PROCEDURE — 2580000003 HC RX 258: Performed by: FAMILY MEDICINE

## 2024-10-23 RX ORDER — DIVALPROEX SODIUM 125 MG/1
250 CAPSULE, COATED PELLETS ORAL EVERY 12 HOURS SCHEDULED
Status: DISCONTINUED | OUTPATIENT
Start: 2024-10-23 | End: 2024-10-28 | Stop reason: HOSPADM

## 2024-10-23 RX ADMIN — CALCIUM 500 MG: 500 TABLET ORAL at 09:08

## 2024-10-23 RX ADMIN — RIVAROXABAN 20 MG: 20 TABLET, FILM COATED ORAL at 09:09

## 2024-10-23 RX ADMIN — PRENATAL WITH FERROUS FUM AND FOLIC ACID 1 TABLET: 3080; 920; 120; 400; 22; 1.84; 3; 20; 10; 1; 12; 200; 27; 25; 2 TABLET ORAL at 09:07

## 2024-10-23 RX ADMIN — SODIUM CHLORIDE: 9 INJECTION, SOLUTION INTRAVENOUS at 01:42

## 2024-10-23 RX ADMIN — DIVALPROEX SODIUM 250 MG: 125 CAPSULE ORAL at 09:08

## 2024-10-23 RX ADMIN — ACETAMINOPHEN 500 MG: 500 TABLET ORAL at 14:23

## 2024-10-23 RX ADMIN — HALOPERIDOL 0.5 MG: 0.5 TABLET ORAL at 14:23

## 2024-10-23 RX ADMIN — DIVALPROEX SODIUM 250 MG: 125 CAPSULE ORAL at 20:46

## 2024-10-23 RX ADMIN — HYDROCODONE BITARTRATE AND ACETAMINOPHEN 1 TABLET: 5; 325 TABLET ORAL at 06:22

## 2024-10-23 RX ADMIN — DONEPEZIL HYDROCHLORIDE 10 MG: 5 TABLET, FILM COATED ORAL at 20:46

## 2024-10-23 RX ADMIN — ATORVASTATIN CALCIUM 40 MG: 40 TABLET, FILM COATED ORAL at 20:47

## 2024-10-23 RX ADMIN — Medication 5 MG: at 17:15

## 2024-10-23 RX ADMIN — HYDROCODONE BITARTRATE AND ACETAMINOPHEN 1 TABLET: 5; 325 TABLET ORAL at 12:31

## 2024-10-23 RX ADMIN — VENLAFAXINE 37.5 MG: 37.5 TABLET ORAL at 09:07

## 2024-10-23 RX ADMIN — HYDROCODONE BITARTRATE AND ACETAMINOPHEN 1 TABLET: 5; 325 TABLET ORAL at 18:04

## 2024-10-23 RX ADMIN — SERTRALINE 50 MG: 50 TABLET, FILM COATED ORAL at 09:08

## 2024-10-23 ASSESSMENT — PAIN DESCRIPTION - LOCATION: LOCATION: HIP

## 2024-10-23 ASSESSMENT — PAIN DESCRIPTION - DESCRIPTORS: DESCRIPTORS: DISCOMFORT

## 2024-10-23 ASSESSMENT — PAIN SCALES - GENERAL
PAINLEVEL_OUTOF10: 5
PAINLEVEL_OUTOF10: 5

## 2024-10-23 ASSESSMENT — PAIN SCALES - WONG BAKER
WONGBAKER_NUMERICALRESPONSE: HURTS A LITTLE BIT
WONGBAKER_NUMERICALRESPONSE: HURTS A LITTLE BIT

## 2024-10-23 ASSESSMENT — PAIN DESCRIPTION - ORIENTATION: ORIENTATION: RIGHT

## 2024-10-23 NOTE — PROGRESS NOTES
Physical Therapy  Physical Therapy Initial Evaluation/Plan of Care    Room #:  0622/0622-01  Patient Name: Maureen Collier  YOB: 1940  MRN: [id number]    Date of Service: 10/23/2024     Tentative placement recommendation: Subacute Rehab  Equipment recommendation: Patient has needed equipment       Evaluating Physical Therapist: Dallin Saunders, PT, DPT #[id number]      Specific Provider Orders/Date/Referring Provider :     10/22/24 1645    PT eval and treat  Start:  10/22/24 1645,   End:  10/22/24 1645,   ONE TIME,   Standing Count:  1 Occurrences,   R       Anson Cross F, DO Acknowledge New    Admitting Diagnosis:   Closed displaced intertrochanteric fracture of right femur, initial encounter ([facility]) [S72.141A]  Intertrochanteric fracture of right femur, closed, initial encounter ([facility]) [S72.141A]      Surgery: ORIF R femur on 10/21/24  Visit Diagnoses         Codes    Closed displaced intertrochanteric fracture of right femur, initial encounter ([facility])    -  Primary S72.141A            Patient Active Problem List   Diagnosis    Hypertension    History of DVT (deep vein thrombosis)    Cerebrovascular accident (CVA) ([facility])    Hyperlipidemia    Mild vascular dementia without behavioral disturbance, psychotic disturbance, mood disturbance, or anxiety ([facility])    Hypokalemia    Dehydration with hypernatremia    Confusion    Episode of recurrent major depressive disorder ([facility])    Unable to care for self    Overactive bladder    TIA (transient ischemic attack)    Syncope    Moderate protein-calorie malnutrition (HCC)    Altered mental status    Dementia with behavioral disturbance ([facility])    Cellulitis    Intertrochanteric fracture of right femur, closed, initial encounter ([facility])        ASSESSMENT of Current Deficits Patient exhibits decreased strength, balance, and endurance impairing functional mobility, transfers, gait , gait distance, and tolerance to activity. Pt very confused and at times combative with max  no

## 2024-10-23 NOTE — PROGRESS NOTES
Progress Note  Date:10/23/2024       Room:0622/0622-01  Patient Name:Maureen Collier     YOB: 1940     Age:84 y.o.        Subjective    Subjective:  Symptoms:  Stable.  She reports malaise, weakness and anorexia.    Diet:  Poor intake.  No nausea or vomiting.    Activity level: Impaired due to weakness.    Pain:  She reports no pain.       Review of Systems   Constitutional:  Negative for fever.   Gastrointestinal:  Positive for anorexia. Negative for nausea and vomiting.   Neurological:  Positive for weakness.   All other systems reviewed and are negative.    Objective         Vitals Last 24 Hours:  TEMPERATURE:  Temp  Av.7 °F (36.5 °C)  Min: 97.6 °F (36.4 °C)  Max: 97.9 °F (36.6 °C)  RESPIRATIONS RANGE: Resp  Av  Min: 14  Max: 19  PULSE OXIMETRY RANGE: SpO2  Av.3 %  Min: 97 %  Max: 98 %  PULSE RANGE: Pulse  Av.3  Min: 85  Max: 98  BLOOD PRESSURE RANGE: Systolic (24hrs), Av , Min:105 , Max:130   ; Diastolic (24hrs), Av, Min:61, Max:74    I/O (24Hr):  No intake or output data in the 24 hours ending 10/23/24 0834  Objective:  General Appearance:  Ill-appearing.    Vital signs: (most recent): Blood pressure 105/68, pulse 88, temperature 97.6 °F (36.4 °C), resp. rate 14, height 1.549 m (5' 1\"), weight 59 kg (130 lb), SpO2 97%, not currently breastfeeding.  Vital signs are normal.  No fever.    Output: Producing urine and producing stool.    HEENT: Normal HEENT exam.    Lungs:  Normal effort and normal respiratory rate.  Breath sounds clear to auscultation.    Heart: Normal rate.  Regular rhythm.    Abdomen: Abdomen is soft.  Bowel sounds are normal.   There is no abdominal tenderness.     Extremities: Decreased range of motion.    Neurological: Patient is alert.      Labs/Imaging/Diagnostics    Labs:  CBC:  Recent Labs     10/20/24  2130 10/23/24  0500   WBC 11.1 9.1   RBC 4.65 3.31*   HGB 14.4 10.3*   HCT 44.9 32.6*   MCV 96.6 98.5   RDW 13.6 13.9    107*

## 2024-10-23 NOTE — PLAN OF CARE
Problem: Skin/Tissue Integrity  Goal: Absence of new skin breakdown  Description: 1.  Monitor for areas of redness and/or skin breakdown  2.  Assess vascular access sites hourly  3.  Every 4-6 hours minimum:  Change oxygen saturation probe site  4.  Every 4-6 hours:  If on nasal continuous positive airway pressure, respiratory therapy assess nares and determine need for appliance change or resting period.  10/23/2024 0235 by Charlie Trimble RN  Outcome: Progressing     Problem: Safety - Adult  Goal: Free from fall injury  10/23/2024 0235 by Charlie Trimble RN  Outcome: Progressing     Problem: Pain  Goal: Verbalizes/displays adequate comfort level or baseline comfort level  10/23/2024 0235 by Charlie Trimble RN  Outcome: Progressing     Problem: Chronic Conditions and Co-morbidities  Goal: Patient's chronic conditions and co-morbidity symptoms are monitored and maintained or improved  Outcome: Progressing     Problem: Discharge Planning  Goal: Discharge to home or other facility with appropriate resources  Outcome: Progressing     Problem: Skin/Tissue Integrity  Goal: Absence of new skin breakdown  Description: 1.  Monitor for areas of redness and/or skin breakdown  2.  Assess vascular access sites hourly  3.  Every 4-6 hours minimum:  Change oxygen saturation probe site  4.  Every 4-6 hours:  If on nasal continuous positive airway pressure, respiratory therapy assess nares and determine need for appliance change or resting period.  10/23/2024 0954 by Kala Vogt RN  Outcome: Progressing  10/23/2024 0235 by Charlie Trimble RN  Outcome: Progressing     Problem: Safety - Adult  Goal: Free from fall injury  10/23/2024 0954 by Kala Vogt RN  Outcome: Progressing  10/23/2024 0235 by Charlie Trimble RN  Outcome: Progressing     Problem: Pain  Goal: Verbalizes/displays adequate comfort level or baseline comfort level  10/23/2024 0954 by Kala Vogt RN  Outcome: Progressing  10/23/2024 0235 by Charlie Trimble

## 2024-10-23 NOTE — PROGRESS NOTES
Physical Therapy  Physical Therapy Treatment Note/Plan of Care    Room #:  0622/0622-01  Patient Name: Maureen Collier  YOB: 1940  MRN: [id number]    Date of Service: 10/23/2024     Tentative placement recommendation: Subacute Rehab  Equipment recommendation: Patient has needed equipment       Evaluating Physical Therapist: Dallin Saunders, PT, DPT #[id number]      Specific Provider Orders/Date/Referring Provider :     10/22/24 1645    PT eval and treat  Start:  10/22/24 1645,   End:  10/22/24 1645,   ONE TIME,   Standing Count:  1 Occurrences,   R       Anson Cross F, DO Acknowledge New    Admitting Diagnosis:   Closed displaced intertrochanteric fracture of right femur, initial encounter ([facility]) [S72.141A]  Intertrochanteric fracture of right femur, closed, initial encounter ([facility]) [S72.141A]      Surgery: ORIF R femur on 10/21/24  Visit Diagnoses         Codes    Closed displaced intertrochanteric fracture of right femur, initial encounter ([facility])    -  Primary S72.141A            Patient Active Problem List   Diagnosis    Hypertension    History of DVT (deep vein thrombosis)    Cerebrovascular accident (CVA) ([facility])    Hyperlipidemia    Mild vascular dementia without behavioral disturbance, psychotic disturbance, mood disturbance, or anxiety ([facility])    Hypokalemia    Dehydration with hypernatremia    Confusion    Episode of recurrent major depressive disorder ([facility])    Unable to care for self    Overactive bladder    TIA (transient ischemic attack)    Syncope    Moderate protein-calorie malnutrition (HCC)    Altered mental status    Dementia with behavioral disturbance ([facility])    Cellulitis    Intertrochanteric fracture of right femur, closed, initial encounter ([facility])        ASSESSMENT of Current Deficits Patient exhibits decreased strength, balance, and endurance impairing functional mobility, transfers, gait , gait distance, and tolerance to activity. Pt very confused and at times combative with max  promote good breathing techniques and provide support and education to maximize respiratory function    PT long term treatment goals are located in below grid    Patient and or family understand(s) diagnosis, prognosis, and plan of care.    Frequency of treatments: Patient will be seen  dailyyy ORIF         Prior Level of Function: Patient ambulated with wheeled walker   Rehab Potential: fair  for baseline    Past medical history:   Past Medical History:   Diagnosis Date    Arthritis     Cerebral artery occlusion with cerebral infarction (HCC)     Depression     DVT (deep vein thrombosis) in pregnancy     DVT of lower extremity, bilateral (HCC)     GERD (gastroesophageal reflux disease)     Hx of blood clots     Hyperlipidemia     Hypertension     cured    Nephrolithiasis      Past Surgical History:   Procedure Laterality Date    ABDOMEN SURGERY  2007, 2008    tummy tuck x2    BLADDER SUSPENSION  2011    CHOLECYSTECTOMY, OPEN  3/21/2013    open cholecystectomy with intropertive cholangiogram    COLONOSCOPY      ENDOSCOPY, COLON, DIAGNOSTIC      FOOT SURGERY      GASTRIC BYPASS SURGERY  March, 2006    [facility]    HIP SURGERY Right 10/21/2024    RIGHT INTERTROCHANTERIC HIP OPEN REDUCTION INTERNAL FIXATION performed by ANETA Martinez DO at [facility] OR    HYSTERECTOMY (CERVIX STATUS UNKNOWN)  42 years ago    [place] ARTHRP KNE CONDYLE&PLATU MEDIAL&LAT COMPARTMENTS N/A 7/31/2018    TOTAL RIGHT KNEE REPLACEMENT ARTHROPLASTY (JOLYNN) performed by ANETA Martinez DO at [facility] OR       SUBJECTIVE:    Precautions: Up with assistance, falls, alarm, confusion, dementia, and Hx of CVA, WBAT RLE      Social history: Patient lives at [facility]    Equipment owned: Equipment at Nursing Home    AM-PAC Basic Mobility       AM-PAC Basic Mobility - Inpatient   How much help is needed turning from your back to your side while in a flat bed without using bedrails?: A Lot  How much help is needed moving from lying on your back to

## 2024-10-23 NOTE — PROGRESS NOTES
OCCUPATIONAL THERAPY INITIAL EVALUATION    [facility]  [address] [place]        Date:10/23/2024                                                  Patient Name: Maureen Collier    MRN: [id number]    : 1940    Room: [address] [place] [address]      Evaluating OT: Stanislav Sandra OTR/L; #[id number]     Referring Provider and Specific Provider Orders/Date:      10/22/24 1645  OT eval and treat  Start:  10/22/24 1645,   End:  10/22/24 1645,   ONE TIME,   Standing Count:  1 Occurrences,   R         Anson Cross,       Placement Recommendation: Subacute Rehab       Diagnosis:   1. Closed displaced intertrochanteric fracture of right femur, initial encounter ([facility])    2. Intertrochanteric fracture of right femur, closed, initial encounter ([facility])         Surgery: RIGHT INTERTROCHANTERIC HIP OPEN REDUCTION INTERNAL FIXATION (Right: Hip)      Pertinent Medical History:       Past Medical History:   Diagnosis Date    Arthritis     Cerebral artery occlusion with cerebral infarction (HCC)     Depression     DVT (deep vein thrombosis) in pregnancy     DVT of lower extremity, bilateral (HCC)     GERD (gastroesophageal reflux disease)     Hx of blood clots     Hyperlipidemia     Hypertension     cured    Nephrolithiasis          Past Surgical History:   Procedure Laterality Date    ABDOMEN SURGERY  ,     tummy tuck x2    BLADDER SUSPENSION      CHOLECYSTECTOMY, OPEN  3/21/2013    open cholecystectomy with intropertive cholangiogram    COLONOSCOPY      ENDOSCOPY, COLON, DIAGNOSTIC      FOOT SURGERY      GASTRIC BYPASS SURGERY      [facility]    HIP SURGERY Right 10/21/2024    RIGHT INTERTROCHANTERIC HIP OPEN REDUCTION INTERNAL FIXATION performed by ANETA Martinez DO at [facility] OR    HYSTERECTOMY (CERVIX STATUS UNKNOWN)  42 years ago    [place] ARTHRP KNE CONDYLE&PLATU MEDIAL&LAT COMPARTMENTS N/A 2018    TOTAL RIGHT KNEE REPLACEMENT

## 2024-10-23 NOTE — PROGRESS NOTES
Department of Orthopedic Surgery  Resident Progress Note    Patient seen and examined. Pain controlled. No new complaints.  Denies chest pain, shortness of breath, dizziness/lightheadedness.  Patient seen evaluated the morning.  Her mental status continues to be altered however her family was at the bedside and states that she has had a significant decline in her mental status since last month.  They state that she has been having frequent falls.  However her current mental status is much decline compared to prior to surgery.    VITALS:  BP (!) 106/28   Pulse 90   Temp 97.6 °F (36.4 °C) (Oral)   Resp 16   Ht 1.549 m (5' 1\")   Wt 59 kg (130 lb)   SpO2 92%   BMI 24.56 kg/m²     General: Patient is alert however she is not oriented to person place or time.    MUSCULOSKELETAL:   right lower extremity:  Dressing C/D/I  Compartments soft and compressible  +PF/DF/EHL  +2/4 DP & PT pulses, Brisk Cap refill, Toes warm and perfused  Distal sensation grossly intact to Peroneals, Sural, Saphenous, and tibial nrs    CBC:   Lab Results   Component Value Date/Time    WBC 9.1 10/23/2024 05:00 AM    HGB 10.3 10/23/2024 05:00 AM    HCT 32.6 10/23/2024 05:00 AM     10/23/2024 05:00 AM     PT/INR:    Lab Results   Component Value Date/Time    PROTIME 14.4 10/20/2024 09:30 PM    INR 1.3 10/20/2024 09:30 PM         ASSESSMENT  S/P right cephalomedullary nail-10/21/2024    PLAN      Continue physical therapy and protocol: WBAT -right LE  24 hour abx coverage  Deep venous thrombosis prophylaxis -Xarelto, early mobilization  PT/OT  Pain Control: IV and PO  Monitor H&H  D/C Plan:  Skilled Nursing Facility    Electronically signed by Romario Salcedo DO on 10/23/2024 at 4:19 PM

## 2024-10-24 PROCEDURE — 6370000000 HC RX 637 (ALT 250 FOR IP): Performed by: FAMILY MEDICINE

## 2024-10-24 PROCEDURE — 2060000000 HC ICU INTERMEDIATE R&B

## 2024-10-24 PROCEDURE — 97535 SELF CARE MNGMENT TRAINING: CPT

## 2024-10-24 PROCEDURE — 6360000002 HC RX W HCPCS: Performed by: FAMILY MEDICINE

## 2024-10-24 RX ORDER — VENLAFAXINE 75 MG/1
75 TABLET ORAL DAILY
Status: DISCONTINUED | OUTPATIENT
Start: 2024-10-24 | End: 2024-10-28 | Stop reason: HOSPADM

## 2024-10-24 RX ORDER — HALOPERIDOL 5 MG/ML
5 INJECTION INTRAMUSCULAR ONCE
Status: COMPLETED | OUTPATIENT
Start: 2024-10-24 | End: 2024-10-24

## 2024-10-24 RX ADMIN — RIVAROXABAN 20 MG: 20 TABLET, FILM COATED ORAL at 08:50

## 2024-10-24 RX ADMIN — HYDROCODONE BITARTRATE AND ACETAMINOPHEN 1 TABLET: 5; 325 TABLET ORAL at 08:50

## 2024-10-24 RX ADMIN — VENLAFAXINE 75 MG: 75 TABLET ORAL at 08:50

## 2024-10-24 RX ADMIN — HALOPERIDOL LACTATE 5 MG: 5 INJECTION, SOLUTION INTRAMUSCULAR at 03:44

## 2024-10-24 RX ADMIN — DIVALPROEX SODIUM 250 MG: 125 CAPSULE ORAL at 08:49

## 2024-10-24 RX ADMIN — PRENATAL WITH FERROUS FUM AND FOLIC ACID 1 TABLET: 3080; 920; 120; 400; 22; 1.84; 3; 20; 10; 1; 12; 200; 27; 25; 2 TABLET ORAL at 08:50

## 2024-10-24 RX ADMIN — Medication 5 MG: at 18:52

## 2024-10-24 RX ADMIN — HYDROCODONE BITARTRATE AND ACETAMINOPHEN 1 TABLET: 5; 325 TABLET ORAL at 15:13

## 2024-10-24 RX ADMIN — ATORVASTATIN CALCIUM 40 MG: 40 TABLET, FILM COATED ORAL at 19:43

## 2024-10-24 RX ADMIN — DONEPEZIL HYDROCHLORIDE 10 MG: 5 TABLET, FILM COATED ORAL at 19:43

## 2024-10-24 RX ADMIN — CALCIUM 500 MG: 500 TABLET ORAL at 08:50

## 2024-10-24 RX ADMIN — DIVALPROEX SODIUM 250 MG: 125 CAPSULE ORAL at 19:43

## 2024-10-24 ASSESSMENT — PAIN SCALES - WONG BAKER: WONGBAKER_NUMERICALRESPONSE: NO HURT

## 2024-10-24 ASSESSMENT — PAIN SCALES - GENERAL: PAINLEVEL_OUTOF10: 0

## 2024-10-24 NOTE — PROGRESS NOTES
Progress Note  Date:10/24/2024       Room:0622/0622-01  Patient Name:Maureen Collier     YOB: 1940     Age:84 y.o.        Subjective    Subjective:  Symptoms:  Stable.  She reports malaise, weakness and anorexia.    Diet:  Poor intake.  She reports  vomiting.  No nausea.    Activity level: Impaired due to weakness.    Pain:  She reports no pain.       Review of Systems   Constitutional:  Negative for fever.   Gastrointestinal:  Positive for anorexia and vomiting. Negative for nausea.   Neurological:  Positive for weakness.   All other systems reviewed and are negative.    Objective         Vitals Last 24 Hours:  TEMPERATURE:  Temp  Av.9 °F (36.6 °C)  Min: 97.6 °F (36.4 °C)  Max: 98.6 °F (37 °C)  RESPIRATIONS RANGE: Resp  Avg: 15.6  Min: 14  Max: 16  PULSE OXIMETRY RANGE: SpO2  Av.7 %  Min: 92 %  Max: 97 %  PULSE RANGE: Pulse  Av.3  Min: 74  Max: 90  BLOOD PRESSURE RANGE: Systolic (24hrs), Av , Min:106 , Max:143   ; Diastolic (24hrs), Av, Min:28, Max:67    I/O (24Hr):    Intake/Output Summary (Last 24 hours) at 10/24/2024 0809  Last data filed at 10/24/2024 0347  Gross per 24 hour   Intake --   Output 400 ml   Net -400 ml     Objective:  General Appearance:  Ill-appearing.    Vital signs: (most recent): Blood pressure (!) 143/66, pulse 77, temperature 98.6 °F (37 °C), resp. rate 14, height 1.549 m (5' 1\"), weight 59 kg (130 lb), SpO2 97%, not currently breastfeeding.  Vital signs are normal.  No fever.    Output: Producing urine and producing stool.    HEENT: Normal HEENT exam.    Lungs:  Normal effort and normal respiratory rate.    Heart: Normal rate.  Regular rhythm.    Abdomen: Abdomen is soft.  Bowel sounds are normal.   There is no abdominal tenderness.     Pulses: Distal pulses are intact.    Neurological: Patient is alert.    Skin:  Warm and dry.      Labs/Imaging/Diagnostics    Labs:  CBC:  Recent Labs     10/23/24  0500   WBC 9.1   RBC 3.31*   HGB 10.3*   HCT 32.6*

## 2024-10-24 NOTE — PROGRESS NOTES
OCCUPATIONAL THERAPY TREATMENT NOTE    [facility]   [address]        Date:10/24/2024  Patient Name: Maureen Collier  MRN: [id number]  : 1940  Room: [address] [place] [address]     Evaluating OT: Stanislav Sandra OTR/L; #[id number]      Referring Provider and Specific Provider Orders/Date:      10/22/24 1645   OT eval and treat  Start:  10/22/24 1645,   End:  10/22/24 1645,   ONE TIME,   Standing Count:  1 Occurrences,   R         Anson Cross, DO       Placement Recommendation: Subacute Rehab        Diagnosis:   1. Closed displaced intertrochanteric fracture of right femur, initial encounter ([facility])    2. Intertrochanteric fracture of right femur, closed, initial encounter ([facility])         Surgery: RIGHT INTERTROCHANTERIC HIP OPEN REDUCTION INTERNAL FIXATION (Right: Hip)       Pertinent Medical History:       Past Medical History        Past Medical History:   Diagnosis Date    Arthritis      Cerebral artery occlusion with cerebral infarction (HCC)      Depression      DVT (deep vein thrombosis) in pregnancy      DVT of lower extremity, bilateral (HCC)      GERD (gastroesophageal reflux disease)      Hx of blood clots      Hyperlipidemia      Hypertension       cured    Nephrolithiasis              Past Surgical History         Past Surgical History:   Procedure Laterality Date    ABDOMEN SURGERY   ,      tummy tuck x2    BLADDER SUSPENSION       CHOLECYSTECTOMY, OPEN   3/21/2013     open cholecystectomy with intropertive cholangiogram    COLONOSCOPY        ENDOSCOPY, COLON, DIAGNOSTIC        FOOT SURGERY        GASTRIC BYPASS SURGERY        [facility]    HIP SURGERY Right 10/21/2024     RIGHT INTERTROCHANTERIC HIP OPEN REDUCTION INTERNAL FIXATION performed by ANETA Martinez DO at [facility] OR    HYSTERECTOMY (CERVIX STATUS UNKNOWN)   42 years ago    [place] ARTHRP KNE CONDYLE&PLATU MEDIAL&LAT COMPARTMENTS N/A 2018     TOTAL  Dynamic: Poor+  Standing: poor with hand held assistance N/t Sitting:     Static: good     Dynamic: fair   Standing: fair  with wheeled walker   Activity Tolerance poor Poor  fair    Visual/  Perceptual Glasses: Yes                            Comments: Upon arrival pt supine in bed, agreeable to therapy session. Pt educated with regards to grooming tasks, UE dressing, bathing tasks. At end of session pt supine in bed,  all lines and tubes intact, call light within reach.     Pt has made limited  progress towards set goals.   Continue with current plan of care      Treatment Time In:1400            Treatment Time Out: 1423                Treatment Charges: Mins Units   Ther Ex  08756     Manual Therapy 82059     Thera Activities 72575     ADL/Home Mgt 76778 23 2   Neuro Re-ed 56824     Group Therapy      Orthotic manage/training  25143     Non-Billable Time     Total Timed Treatment 23 2       Treatment Time additionally includes review of current medical information, gathering information on past medical history/social history and prior level of function, interpretation of standardized testing/informal observation of tasks, treatment for plan of care and goals.     Praveen BALLARD/DICKSON [id number]

## 2024-10-25 PROCEDURE — 6370000000 HC RX 637 (ALT 250 FOR IP): Performed by: FAMILY MEDICINE

## 2024-10-25 PROCEDURE — 2060000000 HC ICU INTERMEDIATE R&B

## 2024-10-25 PROCEDURE — 97110 THERAPEUTIC EXERCISES: CPT

## 2024-10-25 PROCEDURE — 97530 THERAPEUTIC ACTIVITIES: CPT

## 2024-10-25 RX ORDER — ATORVASTATIN CALCIUM 40 MG/1
40 TABLET, FILM COATED ORAL NIGHTLY
Qty: 30 TABLET | Refills: 3
Start: 2024-10-25

## 2024-10-25 RX ORDER — DONEPEZIL HYDROCHLORIDE 10 MG/1
10 TABLET, FILM COATED ORAL NIGHTLY
Qty: 30 TABLET | Refills: 3 | Status: SHIPPED | OUTPATIENT
Start: 2024-10-25

## 2024-10-25 RX ORDER — DIVALPROEX SODIUM 125 MG/1
250 CAPSULE, COATED PELLETS ORAL EVERY 12 HOURS SCHEDULED
Qty: 60 CAPSULE | Refills: 3
Start: 2024-10-25

## 2024-10-25 RX ORDER — HYDROCODONE BITARTRATE AND ACETAMINOPHEN 5; 325 MG/1; MG/1
1 TABLET ORAL EVERY 6 HOURS PRN
Qty: 12 TABLET | Refills: 0 | Status: SHIPPED | OUTPATIENT
Start: 2024-10-25 | End: 2024-10-28

## 2024-10-25 RX ORDER — CALCIUM CARBONATE 500(1250)
500 TABLET ORAL DAILY
Qty: 30 TABLET | Refills: 3 | Status: SHIPPED | OUTPATIENT
Start: 2024-10-26

## 2024-10-25 RX ORDER — ACETAMINOPHEN 500 MG
500 TABLET ORAL EVERY 6 HOURS PRN
Qty: 120 TABLET | Refills: 3
Start: 2024-10-25

## 2024-10-25 RX ORDER — VENLAFAXINE 75 MG/1
75 TABLET ORAL DAILY
Qty: 90 TABLET | Refills: 3
Start: 2024-10-26

## 2024-10-25 RX ADMIN — ATORVASTATIN CALCIUM 40 MG: 40 TABLET, FILM COATED ORAL at 20:49

## 2024-10-25 RX ADMIN — HYDROCODONE BITARTRATE AND ACETAMINOPHEN 1 TABLET: 5; 325 TABLET ORAL at 16:34

## 2024-10-25 RX ADMIN — VENLAFAXINE 75 MG: 75 TABLET ORAL at 13:24

## 2024-10-25 RX ADMIN — HYDROCODONE BITARTRATE AND ACETAMINOPHEN 1 TABLET: 5; 325 TABLET ORAL at 01:21

## 2024-10-25 RX ADMIN — DIVALPROEX SODIUM 250 MG: 125 CAPSULE ORAL at 20:48

## 2024-10-25 RX ADMIN — HYDROCODONE BITARTRATE AND ACETAMINOPHEN 1 TABLET: 5; 325 TABLET ORAL at 09:15

## 2024-10-25 RX ADMIN — HALOPERIDOL 0.5 MG: 0.5 TABLET ORAL at 04:20

## 2024-10-25 RX ADMIN — ACETAMINOPHEN 500 MG: 500 TABLET ORAL at 03:59

## 2024-10-25 RX ADMIN — Medication 5 MG: at 18:44

## 2024-10-25 RX ADMIN — CALCIUM 500 MG: 500 TABLET ORAL at 09:15

## 2024-10-25 RX ADMIN — DONEPEZIL HYDROCHLORIDE 10 MG: 5 TABLET, FILM COATED ORAL at 20:48

## 2024-10-25 RX ADMIN — ACETAMINOPHEN 500 MG: 500 TABLET ORAL at 20:49

## 2024-10-25 RX ADMIN — DIVALPROEX SODIUM 250 MG: 125 CAPSULE ORAL at 09:15

## 2024-10-25 RX ADMIN — PRENATAL WITH FERROUS FUM AND FOLIC ACID 1 TABLET: 3080; 920; 120; 400; 22; 1.84; 3; 20; 10; 1; 12; 200; 27; 25; 2 TABLET ORAL at 13:24

## 2024-10-25 RX ADMIN — RIVAROXABAN 20 MG: 20 TABLET, FILM COATED ORAL at 09:15

## 2024-10-25 ASSESSMENT — PAIN SCALES - WONG BAKER
WONGBAKER_NUMERICALRESPONSE: NO HURT
WONGBAKER_NUMERICALRESPONSE: NO HURT
WONGBAKER_NUMERICALRESPONSE: HURTS A LITTLE BIT
WONGBAKER_NUMERICALRESPONSE: NO HURT

## 2024-10-25 ASSESSMENT — PAIN SCALES - GENERAL
PAINLEVEL_OUTOF10: 5
PAINLEVEL_OUTOF10: 3
PAINLEVEL_OUTOF10: 5
PAINLEVEL_OUTOF10: 6
PAINLEVEL_OUTOF10: 3

## 2024-10-25 ASSESSMENT — PAIN DESCRIPTION - ORIENTATION
ORIENTATION: RIGHT

## 2024-10-25 ASSESSMENT — PAIN DESCRIPTION - DESCRIPTORS
DESCRIPTORS: DISCOMFORT
DESCRIPTORS: ACHING
DESCRIPTORS: DISCOMFORT
DESCRIPTORS: DISCOMFORT

## 2024-10-25 ASSESSMENT — PAIN DESCRIPTION - LOCATION
LOCATION: HIP

## 2024-10-25 NOTE — PROGRESS NOTES
This RN received call from daughter Aliza.  She was upset that the patient is being discharged back to [facility] where she came from.  Daughter states that she was suppose to get a list of facilities to choose from earlier in the week.  She states they never received a list.  Daughter Aliza attempted to reach out to patient advocate and left message.  She states she will be present tomorrow to speak to social work, and does not want her mother returning to [place].

## 2024-10-25 NOTE — PROGRESS NOTES
Physical Therapy  Physical Therapy Treatment Note/Plan of Care    Room #:  0622/0622-01  Patient Name: Maureen Collier  YOB: 1940  MRN: [id number]    Date of Service: 10/25/2024     Tentative placement recommendation: Subacute Rehab  Equipment recommendation: Patient has needed equipment       Evaluating Physical Therapist: Dallin Saunders, PT, DPT #[id number]      Specific Provider Orders/Date/Referring Provider :     10/22/24 1645    PT eval and treat  Start:  10/22/24 1645,   End:  10/22/24 1645,   ONE TIME,   Standing Count:  1 Occurrences,   R       Anson Cross F, DO Acknowledge New    Admitting Diagnosis:   Closed displaced intertrochanteric fracture of right femur, initial encounter ([facility]) [S72.141A]  Intertrochanteric fracture of right femur, closed, initial encounter ([facility]) [S72.141A]      Surgery: ORIF R femur on 10/21/24  Visit Diagnoses         Codes    Closed displaced intertrochanteric fracture of right femur, initial encounter ([facility])    -  Primary S72.141A            Patient Active Problem List   Diagnosis    Hypertension    History of DVT (deep vein thrombosis)    Cerebrovascular accident (CVA) ([facility])    Hyperlipidemia    Mild vascular dementia without behavioral disturbance, psychotic disturbance, mood disturbance, or anxiety ([facility])    Hypokalemia    Dehydration with hypernatremia    Confusion    Episode of recurrent major depressive disorder ([facility])    Unable to care for self    Overactive bladder    TIA (transient ischemic attack)    Syncope    Moderate protein-calorie malnutrition ([facility])    Altered mental status    Dementia with behavioral disturbance ([facility])    Cellulitis    Intertrochanteric fracture of right femur, closed, initial encounter ([facility])        ASSESSMENT of Current Deficits Patient exhibits decreased strength, balance, and endurance impairing functional mobility, transfers, gait , gait distance, and tolerance to activity. Pt needing maximal assist x 1 for supine to sit and

## 2024-10-25 NOTE — DISCHARGE INSTR - COC
Dependent  Med Delivery   prefers mixed with applesauce    Wound Care Documentation and Therapy:  Incision 07/31/18 Knee (Active)   Number of days: 2278       Incision 10/21/24 Hip Right (Active)   Dressing Status Clean;Dry;Intact 10/24/24 2345   Drainage Amount None (dry) 10/23/24 2000   Number of days: 3        Elimination:  Continence:   Bowel: No  Bladder: No  Urinary Catheter: None   Colostomy/Ileostomy/Ileal Conduit: No       Date of Last BM: 10/27/2024    Intake/Output Summary (Last 24 hours) at 10/25/2024 1241  Last data filed at 10/24/2024 2248  Gross per 24 hour   Intake 240 ml   Output --   Net 240 ml     I/O last 3 completed shifts:  In: 360 [P.O.:360]  Out: 400 [Urine:400]    Safety Concerns:     History of Falls (last 30 days)    Impairments/Disabilities:      Vision and Hearing    Nutrition Therapy:  Current Nutrition Therapy:   - Oral Diet:  Low Sodium (3-4gm)    Routes of Feeding: Oral  Liquids: Thin Liquids  Daily Fluid Restriction: no  Last Modified Barium Swallow with Video (Video Swallowing Test): not done    Treatments at the Time of Hospital Discharge:   Respiratory Treatments: see MAR  Oxygen Therapy:  is not on home oxygen therapy.  Ventilator:    - No ventilator support    Rehab Therapies: Physical Therapy and Occupational Therapy  Weight Bearing Status/Restrictions: WBAT R lef  Other Medical Equipment (for information only, NOT a DME order):  wheelchair and hospital bed  Other Treatments: ***    Patient's personal belongings (please select all that are sent with patient):  Tiana    RN SIGNATURE:  Electronically signed by Tanya Biswas RN on 10/28/24 at 4:10 PM EDT    CASE MANAGEMENT/SOCIAL WORK SECTION    Inpatient Status Date: ***    Readmission Risk Assessment Score:  Readmission Risk              Risk of Unplanned Readmission:  25           Discharging to Facility/ Agency   Name:   Address:  Phone:  Fax:    Dialysis Facility (if applicable)   Name:  Address:  Dialysis

## 2024-10-25 NOTE — PROGRESS NOTES
Comprehensive Nutrition Assessment    Type and Reason for Visit:  Initial, RD Nutrition Re-Screen/LOS    Nutrition Recommendations/Plan:   Continue current diet  Recommend ONS Ensure Plus HP BID        Malnutrition Assessment:  Malnutrition Status:  At risk for malnutrition (Comment) (10/25/24 1519)    Context:  Acute Illness     Findings of the 6 clinical characteristics of malnutrition:  Energy Intake:  Mild decrease in energy intake (Comment)  Weight Loss:  No significant weight loss     Body Fat Loss:  No significant body fat loss     Muscle Mass Loss:  No significant muscle mass loss    Fluid Accumulation:  No significant fluid accumulation     Strength:  Not Performed    Nutrition Assessment:    Pt admit d/t fall w/ R femur fx s/p cephalomedullary nail-10/21/2024. PMHx of dementia, CVA, GERD, HTN/HLD, depression, nephrolithiasis. Pt is on regular SILAS diet, intake sporadic 0-50% noted, will add ONS and encourage intake.    Nutrition Related Findings:    abd WDL, A&O to person, trace edema RLE, I/O's +1.26L since admit, dentures Wound Type: Surgical Incision (R hip)       Current Nutrition Intake & Therapies:    Average Meal Intake: 1-25%, 26-50%  Average Supplements Intake: None Ordered  ADULT DIET; Regular; No Added Salt (3-4 gm)  ADULT ORAL NUTRITION SUPPLEMENT; Breakfast, Dinner; Standard High Calorie/High Protein Oral Supplement    Anthropometric Measures:  Height: 154.9 cm (5' 0.98\")  Ideal Body Weight (IBW): 105 lbs (48 kg)    Admission Body Weight: 59 kg (130 lb) (10/20 stated)  Current Body Weight: 59 kg (130 lb) (10/20 stated; UTO bed scale d/t error, neds zeroed), 123.8 % IBW. Weight Source: Stated  Current BMI (kg/m2): 24.6  Usual Body Weight: 60.9 kg (134 lb 5 oz) (9/18/24; actual wt on previous admit)  % Weight Change (Calculated): -3.2  Weight Adjustment For: No Adjustment  BMI Categories: Normal Weight (BMI 22.0 to 24.9) age over 65    Estimated Daily Nutrient Needs:  Energy Requirements

## 2024-10-25 NOTE — PROGRESS NOTES
Spoke with daughterRebecca when she first arrived that patient was being discharged to [facility]. Daughter stated that family did not wish for patient to return to that facility due to recent falls and no one in the family was notified that discharge was pending or of plan. I explained that the discharge order had just been placed and I would ask someone from social work to speak with her.     While rounding on patient later in the shift daughter stated that she was informed that since the discharge order had been placed, the patient would have to return to [facility] and that the family could not chose another facility.     Spoke with CHARLES Villanueva who called her supervisor and stated that the patient would have to return to [facility] and that the family would be responsible to move the patient from the facility if they wished.     Updated charge nurse.

## 2024-10-25 NOTE — PROGRESS NOTES
Department of Family Practice  Adult Daily Progress Note      SUBJECTIVE  KELECHI IS SEEN IN FOLLOW  UP ON [facility] [place] FOR DR. SIMON.  SHE IS TOLERATING TREATMENT.  SHE WAS TO BE DISCHARGED TODAY TO [facility] BUT TRANSPORTATION IS NOT AVAILABLE TILL TOMORROW.  HER DAUGHTER DID NOT WANT HER MOTHER TO GO BACK THERE BUT SHE WILL HAVE TO MAKE ARRANGEMENTS ONCE SHE IS BACK AT [facility].     OBJECTIVE    Physical  VITALS:  /69   Pulse 91   Temp 98.1 °F (36.7 °C) (Oral)   Resp 18   Ht 1.549 m (5' 0.98\")   Wt 59 kg (130 lb)   SpO2 98%   BMI 24.58 kg/m²   CONSTITUTIONAL:  awake, alert, cooperative, no apparent distress, and appears stated age  LUNGS:  No increased work of breathing, good air exchange, clear to auscultation bilaterally, no crackles or wheezing  CARDIOVASCULAR:  Normal apical impulse, regular rate and rhythm, normal S1 and S2, no S3 or S4, and no murmur noted  ABDOMEN:  No scars, normal bowel sounds, soft, non-distended, non-tender, no masses palpated, no hepatosplenomegally  MUSCULOSKELETAL:  DRESSING TO RIGHT HIP  Data  CBC with Differential:    Lab Results   Component Value Date/Time    WBC 9.1 10/23/2024 05:00 AM    RBC 3.31 10/23/2024 05:00 AM    HGB 10.3 10/23/2024 05:00 AM    HCT 32.6 10/23/2024 05:00 AM     10/23/2024 05:00 AM    MCV 98.5 10/23/2024 05:00 AM    MCH 31.1 10/23/2024 05:00 AM    MCHC 31.6 10/23/2024 05:00 AM    RDW 13.9 10/23/2024 05:00 AM    LYMPHOPCT 20 10/23/2024 05:00 AM    MONOPCT 8 10/23/2024 05:00 AM    EOSPCT 0 10/23/2024 05:00 AM    BASOPCT 0 10/23/2024 05:00 AM    MONOSABS 0.71 10/23/2024 05:00 AM    LYMPHSABS 1.81 10/23/2024 05:00 AM    EOSABS 0.02 10/23/2024 05:00 AM    BASOSABS 0.01 10/23/2024 05:00 AM     BMP:    Lab Results   Component Value Date/Time     10/23/2024 05:00 AM    K 4.1 10/23/2024 05:00 AM    K 4.0 05/12/2023 04:25 AM     10/23/2024 05:00 AM    CO2 30 10/23/2024 05:00 AM    BUN 26 10/23/2024 05:00 AM    CREATININE 1.0

## 2024-10-26 PROCEDURE — 2060000000 HC ICU INTERMEDIATE R&B

## 2024-10-26 PROCEDURE — 6370000000 HC RX 637 (ALT 250 FOR IP): Performed by: FAMILY MEDICINE

## 2024-10-26 PROCEDURE — 97110 THERAPEUTIC EXERCISES: CPT

## 2024-10-26 RX ADMIN — HALOPERIDOL 0.5 MG: 0.5 TABLET ORAL at 17:07

## 2024-10-26 RX ADMIN — DIVALPROEX SODIUM 250 MG: 125 CAPSULE ORAL at 07:52

## 2024-10-26 RX ADMIN — DONEPEZIL HYDROCHLORIDE 10 MG: 5 TABLET, FILM COATED ORAL at 20:26

## 2024-10-26 RX ADMIN — CALCIUM 500 MG: 500 TABLET ORAL at 07:52

## 2024-10-26 RX ADMIN — ATORVASTATIN CALCIUM 40 MG: 40 TABLET, FILM COATED ORAL at 20:26

## 2024-10-26 RX ADMIN — RIVAROXABAN 20 MG: 20 TABLET, FILM COATED ORAL at 07:52

## 2024-10-26 RX ADMIN — PRENATAL WITH FERROUS FUM AND FOLIC ACID 1 TABLET: 3080; 920; 120; 400; 22; 1.84; 3; 20; 10; 1; 12; 200; 27; 25; 2 TABLET ORAL at 09:01

## 2024-10-26 RX ADMIN — Medication 5 MG: at 17:08

## 2024-10-26 RX ADMIN — DIVALPROEX SODIUM 250 MG: 125 CAPSULE ORAL at 20:26

## 2024-10-26 RX ADMIN — HYDROCODONE BITARTRATE AND ACETAMINOPHEN 1 TABLET: 5; 325 TABLET ORAL at 20:25

## 2024-10-26 RX ADMIN — HYDROCODONE BITARTRATE AND ACETAMINOPHEN 1 TABLET: 5; 325 TABLET ORAL at 09:31

## 2024-10-26 RX ADMIN — VENLAFAXINE 75 MG: 75 TABLET ORAL at 07:52

## 2024-10-26 ASSESSMENT — PAIN DESCRIPTION - ORIENTATION: ORIENTATION: RIGHT

## 2024-10-26 ASSESSMENT — PAIN SCALES - WONG BAKER
WONGBAKER_NUMERICALRESPONSE: NO HURT
WONGBAKER_NUMERICALRESPONSE: NO HURT

## 2024-10-26 ASSESSMENT — PAIN SCALES - GENERAL
PAINLEVEL_OUTOF10: 5
PAINLEVEL_OUTOF10: 3
PAINLEVEL_OUTOF10: 3

## 2024-10-26 ASSESSMENT — PAIN DESCRIPTION - DESCRIPTORS: DESCRIPTORS: DISCOMFORT

## 2024-10-26 ASSESSMENT — PAIN DESCRIPTION - LOCATION
LOCATION: OTHER (COMMENT)
LOCATION: HIP

## 2024-10-26 NOTE — DISCHARGE SUMMARY
Physician Discharge Summary     Patient ID:  Maureen Collier  [id number]  84 y.o.  1940    Admit date: 10/20/2024    Discharge date and time: No discharge date for patient encounter.     Admitting Physician: Anson Simon DO     Discharge Physician: Sneha Victor DO    Admission Diagnoses: Closed displaced intertrochanteric fracture of right femur, initial encounter ([facility]) [S72.141A]  Intertrochanteric fracture of right femur, closed, initial encounter ([facility]) [S72.141A]    Discharge Diagnoses: CLOSED DISPLACED INTERTROCHANTERIC FRACTURE OF RIGHT FEMUR, DEMENTIA    Admission Condition: poor    Discharged Condition: stable    Indication for Admission: FRACTURED FEMUR NEEDING REPAIR    Hospital Course: 10/21/2024   84 y female with hx of fall at nursing. Sustained a right femur fracture.  Increased dementia. No CP no SOB.  No significant complaints. Admitted for femur fractur. Orthopedics consulted from ED. Previous EKG AND ECHO REVIEWED.   10/22/2024     Subjectiv          Subjective:  Symptoms:  Stable.  She reports malaise, weakness and anorexia.    Diet:  Poor intake.  No nausea or vomiting.    Activity level: Impaired due to weakness.    Pain:  She reports no pain.  10/23/2024   Symptoms:  Stable.  She reports malaise, weakness and anorexia.    Diet:  Poor intake.  No nausea or vomiting.    Activity level: Impaired due to weakness.    Pain:  She reports no pain.    10/24/2024   Symptoms:  Stable.  She reports malaise, weakness and anorexia.    Diet:  Poor intake.  She reports  vomiting.  No nausea.    Activity level: Impaired due to weakness.    Pain:  She reports no pain.   10/25/2024    MAUREEN [place] [facility] [place]. SHE IS TOLERATING TREATMENT. SHE WAS TO BE DISCHARGED TODAY TO [facility] BUT TRANSPORTATION IS NOT AVAILABLE TILL TOMORROW. HER DAUGHTER DID NOT WANT HER MOTHER TO GO BACK THERE BUT SHE WILL HAVE TO MAKE ARRANGEMENTS ONCE SHE IS BACK AT [facility].

## 2024-10-26 NOTE — PLAN OF CARE
Problem: Chronic Conditions and Co-morbidities  Goal: Patient's chronic conditions and co-morbidity symptoms are monitored and maintained or improved  Outcome: Progressing     Problem: Discharge Planning  Goal: Discharge to home or other facility with appropriate resources  10/26/2024 1738 by Bambi Adames RN  Outcome: Progressing  10/26/2024 0415 by Charlie Trimble RN  Outcome: Adequate for Discharge     Problem: Skin/Tissue Integrity  Goal: Absence of new skin breakdown  Description: 1.  Monitor for areas of redness and/or skin breakdown  2.  Assess vascular access sites hourly  3.  Every 4-6 hours minimum:  Change oxygen saturation probe site  4.  Every 4-6 hours:  If on nasal continuous positive airway pressure, respiratory therapy assess nares and determine need for appliance change or resting period.  Outcome: Progressing     Problem: Safety - Adult  Goal: Free from fall injury  10/26/2024 1738 by Bambi Adames RN  Outcome: Progressing  10/26/2024 0415 by Charlie Trimble RN  Outcome: Adequate for Discharge     Problem: Pain  Goal: Verbalizes/displays adequate comfort level or baseline comfort level  10/26/2024 1738 by Bambi Adames RN  Outcome: Progressing  10/26/2024 0415 by Charlie Trimble RN  Outcome: Adequate for Discharge     Problem: Nutrition Deficit:  Goal: Optimize nutritional status  Outcome: Progressing    Pt received haldol, norco throughout shift. Safety and comfort maintained.

## 2024-10-26 NOTE — PROGRESS NOTES
Physical Therapy  Physical Therapy Treatment Note/Plan of Care    Room #:  0622/0622-01  Patient Name: Maureen Collier  YOB: 1940  MRN: [id number]    Date of Service: 10/26/2024     Tentative placement recommendation: Subacute Rehab  Equipment recommendation: Patient has needed equipment       Evaluating Physical Therapist: Dallin Saunders, PT, DPT #[id number]      Specific Provider Orders/Date/Referring Provider :     10/22/24 1645    PT eval and treat  Start:  10/22/24 1645,   End:  10/22/24 1645,   ONE TIME,   Standing Count:  1 Occurrences,   R       Anson Cross F, DO Acknowledge New    Admitting Diagnosis:   Closed displaced intertrochanteric fracture of right femur, initial encounter ([facility]) [S72.141A]  Intertrochanteric fracture of right femur, closed, initial encounter ([facility]) [S72.141A]      Surgery: ORIF R femur on 10/21/24  Visit Diagnoses         Codes    Closed displaced intertrochanteric fracture of right femur, initial encounter ([facility])    -  Primary S72.141A            Patient Active Problem List   Diagnosis    Hypertension    History of DVT (deep vein thrombosis)    Cerebrovascular accident (CVA) ([facility])    Hyperlipidemia    Mild vascular dementia without behavioral disturbance, psychotic disturbance, mood disturbance, or anxiety ([facility])    Hypokalemia    Dehydration with hypernatremia    Confusion    Episode of recurrent major depressive disorder ([facility])    Unable to care for self    Overactive bladder    TIA (transient ischemic attack)    Syncope    Moderate protein-calorie malnutrition ([facility])    Altered mental status    Dementia with behavioral disturbance ([facility])    Cellulitis    Intertrochanteric fracture of right femur, closed, initial encounter ([facility])        ASSESSMENT of Current Deficits Patient exhibits decreased strength, balance, and endurance impairing functional mobility, transfers, gait , gait distance, and tolerance to activity. Pt confused needing verbal/tactile cueing for exercises:  PROM for all exercises except ankle pumps were AAROM. Pt confused and having difficulty with following directions.      PHYSICAL THERAPY  PLAN OF CARE       Physical therapy plan of care is established based on physician order,  patient diagnosis and clinical assessment    Current Treatment Recommendations:    -Bed Mobility: Lower and upper extremity exercises, and trunk control activities  -Sitting Balance: Incorporate reaching activities to activate trunk muscles , Hands on support to maintain midline , Facilitate active trunk muscle engagement , Facilitate postural control in all planes , and Engage in core activities to allow for movement within base of support   -Standing Balance: Perform strengthening exercises in standing to promote motor control with or without upper extremity support , Instruct patient on adequate base of support to maintain balance, and Challenge balance utilizing reaching  activities beyond center of gravity    -Transfers: Provide instruction on proper hand and foot position for adequate transfer of weight onto lower extremities and use of gait device if needed, Cues for hand placement, technique and safety. Provide stabilization to prevent fall , Facilitate weight shift forward on to lower extremities and provide necessary stabilization of bilateral lower extremities , Support transfer of weight on to lower extremities, and Assist with extension of knees trunk and hip to accept weight transfer   -Gait: Gait training, Standing activities to improve: base of support, weight shift, weight bearing , Exercises to improve trunk control, Exercises to improve hip and knee control, Performance of protected weight bearing activities, and Activities to increase weight bearing   -Endurance: Utilize Supervised activities to increase level of endurance to allow for safe functional mobility including transfers and gait  and Use graduated activities to promote good breathing techniques and provide

## 2024-10-27 PROCEDURE — 97530 THERAPEUTIC ACTIVITIES: CPT

## 2024-10-27 PROCEDURE — 97110 THERAPEUTIC EXERCISES: CPT

## 2024-10-27 PROCEDURE — 2060000000 HC ICU INTERMEDIATE R&B

## 2024-10-27 PROCEDURE — 6370000000 HC RX 637 (ALT 250 FOR IP): Performed by: FAMILY MEDICINE

## 2024-10-27 RX ADMIN — DIVALPROEX SODIUM 250 MG: 125 CAPSULE ORAL at 19:53

## 2024-10-27 RX ADMIN — Medication 5 MG: at 18:23

## 2024-10-27 RX ADMIN — HYDROCODONE BITARTRATE AND ACETAMINOPHEN 1 TABLET: 5; 325 TABLET ORAL at 19:53

## 2024-10-27 RX ADMIN — RIVAROXABAN 20 MG: 20 TABLET, FILM COATED ORAL at 08:10

## 2024-10-27 RX ADMIN — CALCIUM 500 MG: 500 TABLET ORAL at 08:10

## 2024-10-27 RX ADMIN — HYDROCODONE BITARTRATE AND ACETAMINOPHEN 1 TABLET: 5; 325 TABLET ORAL at 04:52

## 2024-10-27 RX ADMIN — PRENATAL WITH FERROUS FUM AND FOLIC ACID 1 TABLET: 3080; 920; 120; 400; 22; 1.84; 3; 20; 10; 1; 12; 200; 27; 25; 2 TABLET ORAL at 08:10

## 2024-10-27 RX ADMIN — DIVALPROEX SODIUM 250 MG: 125 CAPSULE ORAL at 08:09

## 2024-10-27 RX ADMIN — HALOPERIDOL 0.5 MG: 0.5 TABLET ORAL at 14:20

## 2024-10-27 RX ADMIN — DONEPEZIL HYDROCHLORIDE 10 MG: 5 TABLET, FILM COATED ORAL at 19:53

## 2024-10-27 RX ADMIN — HYDROCODONE BITARTRATE AND ACETAMINOPHEN 1 TABLET: 5; 325 TABLET ORAL at 12:19

## 2024-10-27 RX ADMIN — HALOPERIDOL 0.5 MG: 0.5 TABLET ORAL at 04:52

## 2024-10-27 RX ADMIN — VENLAFAXINE 75 MG: 75 TABLET ORAL at 08:10

## 2024-10-27 RX ADMIN — ATORVASTATIN CALCIUM 40 MG: 40 TABLET, FILM COATED ORAL at 19:53

## 2024-10-27 ASSESSMENT — PAIN SCALES - WONG BAKER
WONGBAKER_NUMERICALRESPONSE: NO HURT

## 2024-10-27 ASSESSMENT — PAIN DESCRIPTION - ORIENTATION
ORIENTATION: RIGHT
ORIENTATION: RIGHT

## 2024-10-27 ASSESSMENT — PAIN DESCRIPTION - DESCRIPTORS: DESCRIPTORS: DISCOMFORT

## 2024-10-27 ASSESSMENT — PAIN DESCRIPTION - LOCATION
LOCATION: HIP
LOCATION: HIP

## 2024-10-27 ASSESSMENT — PAIN SCALES - GENERAL
PAINLEVEL_OUTOF10: 0
PAINLEVEL_OUTOF10: 4
PAINLEVEL_OUTOF10: 0
PAINLEVEL_OUTOF10: 5

## 2024-10-27 ASSESSMENT — PAIN - FUNCTIONAL ASSESSMENT: PAIN_FUNCTIONAL_ASSESSMENT: PREVENTS OR INTERFERES WITH MANY ACTIVE NOT PASSIVE ACTIVITIES

## 2024-10-27 NOTE — PROGRESS NOTES
Department of Family Practice  Adult Daily Progress Note      SUBJECTIVE  KELECHI IS SEEN IN FOLLOW  UP ON [facility] [place] SIMON.  SHE IS TOLERATING TREATMENT.  SHE IS READY FOR DISCHARGE.  FAMILY DID NOT WANT HER TO RETURN TO [facility].  THE  THERE HAS BEEN WORKING ON TRANSFER TO ANOTHER FACILITY FOR SOME TIME ALREADY WITHOUT SUCCESS.  THE FAMILY HAS APPEALED THE DISCHARGE FROM [facility].  WE ARE WAITING FOR THAT PROCESS TO COMPLETE.       OBJECTIVE    Physical  VITALS:  /61   Pulse 85   Temp 97.6 °F (36.4 °C) (Axillary)   Resp 18   Ht 1.549 m (5' 0.98\")   Wt 59 kg (130 lb)   SpO2 97%   BMI 24.58 kg/m²   CONSTITUTIONAL:  awake, alert, cooperative, no apparent distress, and appears stated age  LUNGS:  No increased work of breathing, good air exchange, clear to auscultation bilaterally, no crackles or wheezing  CARDIOVASCULAR:  Normal apical impulse, regular rate and rhythm, normal S1 and S2, no S3 or S4, and no murmur noted  ABDOMEN:  No scars, normal bowel sounds, soft, non-distended, non-tender, no masses palpated, no hepatosplenomegally  MUSCULOSKELETAL:  DRESSING TO RIGHT HIP  Data  CBC with Differential:    Lab Results   Component Value Date/Time    WBC 9.1 10/23/2024 05:00 AM    RBC 3.31 10/23/2024 05:00 AM    HGB 10.3 10/23/2024 05:00 AM    HCT 32.6 10/23/2024 05:00 AM     10/23/2024 05:00 AM    MCV 98.5 10/23/2024 05:00 AM    MCH 31.1 10/23/2024 05:00 AM    MCHC 31.6 10/23/2024 05:00 AM    RDW 13.9 10/23/2024 05:00 AM    LYMPHOPCT 20 10/23/2024 05:00 AM    MONOPCT 8 10/23/2024 05:00 AM    EOSPCT 0 10/23/2024 05:00 AM    BASOPCT 0 10/23/2024 05:00 AM    MONOSABS 0.71 10/23/2024 05:00 AM    LYMPHSABS 1.81 10/23/2024 05:00 AM    EOSABS 0.02 10/23/2024 05:00 AM    BASOSABS 0.01 10/23/2024 05:00 AM     BMP:    Lab Results   Component Value Date/Time     10/23/2024 05:00 AM    K 4.1 10/23/2024 05:00 AM    K 4.0 05/12/2023 04:25 AM     10/23/2024 05:00 AM    CO2 30 10/23/2024

## 2024-10-27 NOTE — PROGRESS NOTES
Physical Therapy  Physical Therapy Treatment Note/Plan of Care    Room #:  0622/0622-01  Patient Name: Maureen Collier  YOB: 1940  MRN: [id number]    Date of Service: 10/27/2024     Tentative placement recommendation: Subacute Rehab  Equipment recommendation: Patient has needed equipment       Evaluating Physical Therapist: Dallin Saunders PT, DPT #[id number]      Specific Provider Orders/Date/Referring Provider :     10/22/24 1645    PT eval and treat  Start:  10/22/24 1645,   End:  10/22/24 1645,   ONE TIME,   Standing Count:  1 Occurrences,   R       Anson Cross F, DO Acknowledge New    Admitting Diagnosis:   Closed displaced intertrochanteric fracture of right femur, initial encounter ([facility]) [S72.141A]  Intertrochanteric fracture of right femur, closed, initial encounter ([facility]) [S72.141A]      Surgery: ORIF R femur on 10/21/24  Visit Diagnoses         Codes    Closed displaced intertrochanteric fracture of right femur, initial encounter ([facility])    -  Primary S72.141A            Patient Active Problem List   Diagnosis    Hypertension    History of DVT (deep vein thrombosis)    Cerebrovascular accident (CVA) ([facility])    Hyperlipidemia    Mild vascular dementia without behavioral disturbance, psychotic disturbance, mood disturbance, or anxiety ([facility])    Hypokalemia    Dehydration with hypernatremia    Confusion    Episode of recurrent major depressive disorder ([facility])    Unable to care for self    Overactive bladder    TIA (transient ischemic attack)    Syncope    Moderate protein-calorie malnutrition ([facility])    Altered mental status    Dementia with behavioral disturbance ([facility])    Cellulitis    Intertrochanteric fracture of right femur, closed, initial encounter ([facility])        ASSESSMENT of Current Deficits Patient exhibits decreased strength, balance, and endurance impairing functional mobility, transfers, gait , gait distance, and tolerance to activity. Patient lethargic during session today, however was  poor +  Dynamic Standing: not assessed    Sitting EOB:  fair   Dynamic Standing: fair- with WW     Patient is Alert & Oriented x person and follows one step directions intermittently    Sensation:  Patient  denies numbness/tingling   Edema:  no   Endurance: poor +    Vitals: room air   Blood Pressure at rest  Blood Pressure during session    Heart Rate at rest  Heart Rate during session    SPO2 at rest %  SPO2 during session %     Patient education  Patient educated on role of Physical Therapy, risks of immobility, safety and plan of care, energy conservation,  importance of mobility while in hospital , ankle pumps, quad set and glut set for edema control, blood clot prevention, safety , and positioning for skin integrity and comfort     Patient response to education:   Pt verbalized understanding Pt demonstrated skill Pt requires further education in this area   Yes Partial Yes      Treatment:  Patient practiced and was instructed/facilitated in the following treatment: Patient assisted with supine exercises. Patient assisted to edge of bed, however only tolerates x1min, and assisted back to supine, to head of bed and for comfort and skin integrity. SCDs donned and foam heel floaters.           Therapeutic Exercises:  ankle pumps, heel slide, hip abduction/adduction, and straight leg raise  x 10 reps.     At end of session, patient in bed with  alarm  call light and phone within reach,  all lines and tubes intact, nursing notified.      Patient would benefit from continued skilled Physical Therapy to improve functional independence and quality of life.         Patient's/ family goals   rehab    Time in 100  Time out 123    Total Treatment Time  23 minutes      CPT codes:  Therapeutic activities (05859)   11 minutes  1 unit(s)  Therapeutic exercises (67607)   12 minutes  1 unit(s)    Lauren Tobias, [facility]  #[id number]

## 2024-10-27 NOTE — PLAN OF CARE
Problem: Discharge Planning  Goal: Discharge to home or other facility with appropriate resources  10/27/2024 0300 by Charlie Trimble RN  Outcome: Adequate for Discharge  10/26/2024 1738 by Bambi Adames RN  Outcome: Progressing     Problem: Skin/Tissue Integrity  Goal: Absence of new skin breakdown  Description: 1.  Monitor for areas of redness and/or skin breakdown  2.  Assess vascular access sites hourly  3.  Every 4-6 hours minimum:  Change oxygen saturation probe site  4.  Every 4-6 hours:  If on nasal continuous positive airway pressure, respiratory therapy assess nares and determine need for appliance change or resting period.  10/27/2024 0300 by Charlie Trimble RN  Outcome: Adequate for Discharge  10/26/2024 1738 by Bambi Adames RN  Outcome: Progressing     Problem: Safety - Adult  Goal: Free from fall injury  10/27/2024 0300 by Charlie Trimble RN  Outcome: Adequate for Discharge  10/26/2024 1738 by Bambi Adames RN  Outcome: Progressing     Problem: Pain  Goal: Verbalizes/displays adequate comfort level or baseline comfort level  10/27/2024 0300 by Charlie Trimble RN  Outcome: Adequate for Discharge  10/26/2024 1738 by Bambi Adames RN  Outcome: Progressing

## 2024-10-28 VITALS
BODY MASS INDEX: 24.55 KG/M2 | HEIGHT: 61 IN | WEIGHT: 130 LBS | TEMPERATURE: 97.5 F | DIASTOLIC BLOOD PRESSURE: 80 MMHG | HEART RATE: 85 BPM | RESPIRATION RATE: 16 BRPM | SYSTOLIC BLOOD PRESSURE: 100 MMHG | OXYGEN SATURATION: 94 %

## 2024-10-28 PROCEDURE — 97110 THERAPEUTIC EXERCISES: CPT

## 2024-10-28 PROCEDURE — 6370000000 HC RX 637 (ALT 250 FOR IP): Performed by: FAMILY MEDICINE

## 2024-10-28 PROCEDURE — 97535 SELF CARE MNGMENT TRAINING: CPT

## 2024-10-28 RX ADMIN — PRENATAL WITH FERROUS FUM AND FOLIC ACID 1 TABLET: 3080; 920; 120; 400; 22; 1.84; 3; 20; 10; 1; 12; 200; 27; 25; 2 TABLET ORAL at 09:00

## 2024-10-28 RX ADMIN — CALCIUM 500 MG: 500 TABLET ORAL at 09:00

## 2024-10-28 RX ADMIN — Medication 5 MG: at 17:35

## 2024-10-28 RX ADMIN — HYDROCODONE BITARTRATE AND ACETAMINOPHEN 1 TABLET: 5; 325 TABLET ORAL at 05:10

## 2024-10-28 RX ADMIN — DIVALPROEX SODIUM 250 MG: 125 CAPSULE ORAL at 09:00

## 2024-10-28 RX ADMIN — RIVAROXABAN 20 MG: 20 TABLET, FILM COATED ORAL at 09:00

## 2024-10-28 RX ADMIN — HYDROCODONE BITARTRATE AND ACETAMINOPHEN 1 TABLET: 5; 325 TABLET ORAL at 16:24

## 2024-10-28 RX ADMIN — VENLAFAXINE 75 MG: 75 TABLET ORAL at 09:00

## 2024-10-28 ASSESSMENT — PAIN SCALES - WONG BAKER: WONGBAKER_NUMERICALRESPONSE: NO HURT

## 2024-10-28 ASSESSMENT — PAIN SCALES - GENERAL
PAINLEVEL_OUTOF10: 4
PAINLEVEL_OUTOF10: 6

## 2024-10-28 ASSESSMENT — PAIN DESCRIPTION - LOCATION: LOCATION: GENERALIZED

## 2024-10-28 ASSESSMENT — PAIN DESCRIPTION - DESCRIPTORS: DESCRIPTORS: ACHING;BURNING

## 2024-10-28 NOTE — PROGRESS NOTES
Physical Therapy  Physical Therapy Treatment Note/Plan of Care    Room #:  0622/0622-01  Patient Name: Maureen Collier  YOB: 1940  MRN: [id number]    Date of Service: 10/28/2024     Tentative placement recommendation: Subacute Rehab  Equipment recommendation: Patient has needed equipment       Evaluating Physical Therapist: Dallin Saunders, PT, DPT #[id number]      Specific Provider Orders/Date/Referring Provider :     10/22/24 1645    PT eval and treat  Start:  10/22/24 1645,   End:  10/22/24 1645,   ONE TIME,   Standing Count:  1 Occurrences,   R       Tay, Anson F, DO Acknowledge New    Admitting Diagnosis:   Closed displaced intertrochanteric fracture of right femur, initial encounter ([facility]) [S72.141A]  Intertrochanteric fracture of right femur, closed, initial encounter ([facility]) [S72.141A]      Surgery: ORIF R femur on 10/21/24  Visit Diagnoses         Codes    Closed displaced intertrochanteric fracture of right femur, initial encounter ([facility])    -  Primary S72.141A            Patient Active Problem List   Diagnosis    Hypertension    History of DVT (deep vein thrombosis)    Cerebrovascular accident (CVA) ([facility])    Hyperlipidemia    Mild vascular dementia without behavioral disturbance, psychotic disturbance, mood disturbance, or anxiety ([facility])    Hypokalemia    Dehydration with hypernatremia    Confusion    Episode of recurrent major depressive disorder ([facility])    Unable to care for self    Overactive bladder    TIA (transient ischemic attack)    Syncope    Moderate protein-calorie malnutrition ([facility])    Altered mental status    Dementia with behavioral disturbance ([facility])    Cellulitis    Intertrochanteric fracture of right femur, closed, initial encounter ([facility])        ASSESSMENT of Current Deficits Patient exhibits decreased strength, balance, and endurance impairing functional mobility, transfers, gait , gait distance, and tolerance to activity. RN (Estrella) stated patient was given Haldol to calm her

## 2024-10-28 NOTE — CARE COORDINATION
10/28/2024 1214p () SS NOTE: discharge appeal outcome from [facility]IO continues to pend.ISA spoke with liaison Karen at [facility] in [place]. Per Karen, they have a bed available on their regular unit at [facility] and will accept pt there. Per Karen, before they can start the precert for [facility], they had to cancel the insurance auth for [employer]. ISA reached out to rehab dept to request updated therapy notes be placed.    ISA met with pt's dtr to provide update. Dtr is aware the appeal from [facility] continues to pend. Dtr aware a new insurance auth will need to be obtained for [facility]. ISA will cont to provide updates to pt and dtr.    Will need DEREJE signed.     Electronically signed by CHARLES Paris on 10/28/2024 at 12:24 PM    
10/28/2024 348p (sf)  NOTE: Received [facility] dc appeal outcome at 1429. [facility] agrees with termination of Hospital services, beneficiary liability starts on 10/29/2024 at 12pm noon.     Dtr Rebecca present in room. ISA provided copy of the [place] 12 to dtr. Signature obtained, dtr voiced understanding of beneficiry liability for 10/29/2024 at 12pm.    ISA spoke with liacaroline Jones at [facility] in [place]. Per Karen, precert was started and currently pending. Karen notified SW that they will accept pt on this date under pt's Medicaid. Once pt is at their facility, they will await insurance authorization from [facility]. ISA relayed information to pt's dtr Rebecca and she is in agreement for pt to dc to [facility] on this date. [facility] arranged for 6pm p/up. ISA notified liaison Karen, dtrs & nursing of p/up time.     Electronically signed by CHARLES Paris on 10/28/2024 at 4:03 PM    
[id number] ()  NOTE: SW received call from SNF greer Jones from [facility] stating they have RECEIVED AUTH for pt to transfer. Charge nurse was notified. Will need DEREJE signed prior to dc.    **addend 545p** Pt's dtr notified SW that she had some concerns regarding pt returning to [place]. SW provided dtr with a list of SNF's that have memory care units. Dtr is aware that there is a dc order noted in chart and that the hospital is unable to keep pt while they try to find a new facility. Therefore, dtr is aware pt will have to return to [facility]. Dtr expressed frustration but is ok with pt returning. SW contacted [facility] to arrange for ambulance transfer. However, they do not have any any openings until middle of the night. SW asked that they try to outsource, however was notified that they do not believe they will be able to find another transportation company to outsource. Therefore, [facility] was arranged for a 9am p/up. Liacaroline Jones, dtr and nursing aware of the 9am p/up. Ambulance form completed.      Electronically signed by CHARLES Paris on 10/25/2024 at 11:12 AM  
Continue to await discharge appeal outcome from [employer]. [employer] secure portal states Physician Review Completed but outcome has not been loaded, called or faxed at this time.   Called [employer] [phone] and left secure message questioning status of appeal.  
ISA spoke with both daughters, Aliza and Danielle this am who are stating they are wanting to look into another facility for mom at [place] and not wanting her to return to [facility].  Previous notes reviewed at length and Precert was already obtained for [facility] and transport arranged via [facility] for 9am this morning due to no transports available last evening.  They are aware of this but still wanted to discuss other options, they were provided a list of alternate facilities to work on getting patient to when she returns to [facility] as per protocol.  Charge RN will reach out to Dr Victor who is covering for Dr Cross and discuss, at this time, have placed [facility] ambulance on [facility] Call, and canceled 9am .    Addendum:  ISA spoke with Karen at [place] for [facility].  She states she has met with daughters x3 while patient has been in hospital to address concerns as well as the  of the building did call and speak with daughters yesterday to address concerns as well.  Due to location of [facility], Patients daughters had already been trying to get patient moved to their [facility] facility to be closer to family.  This was already being explored prior to patients admission to hospital, and she is currently and has been on a wait list for [facility] Dementia unit.  They did check bed availability there yesterday and there is still no bed open and daughters did talk to someone at [facility] about this and are aware Patient remains on the list.  ISA again reiterated to haroon Abrams at bedside and Danielle Via phone that patient will require a Dementia unit and therefore may need to return to [facility] to continue working on an alternate plan as had previously been being explored.  Aliza did inquire about [facility] but ISA informed her they do not have a locked dementia unit and no long term care beds currently.    Addendum:  Spoke with Daughter, Rebecca via phone, she is not at hospital currently.  Informed her that  
Received [facility] DC appeal request case Control ID [id number], EMR Key [id number]. No family currently at bedside.  Placed letter of Detailed Notice of discharge on bedside table and called to review DND letter with Aliza via phone, she states understanding.  [facility] jeanie Santos regarding same.     Will submit required information to [employer] via Portal once all information gathered.  
Requested information submitted through secure [employer] portal at 1718 for discharge appeal.  Case Control Number: [id number].    Patient will remain in hospital until [facility] outcome received. Discharge order to also remain.  
SOCIAL WORK / DISCHARGE PLANNING:  Sw discussed case with [facility] rep will start PRECERT for [facility], [facility] [phone], Fax [phone] today as [employer] takes extended time. Pt is a medicaid bedhold, but family will wish for skilled LOC at [place]. TSM ordered today. DEREJE will need signed by physician.               Electronically signed by CHARLES Viera on 10/24/2024 at 2:44 PM      
SOCIAL WORK / DISCHARGE PLANNING:  Sw met with pt's dtrs at bedside. They are on a waiting list for bed at [facility] and that is why pt was admitted to [facility]. If necessary will return to [facility]. PRECERT is needed. PT/OT needs ordered, Nayeli CAMACHO charge aware. WBAT POD#1 ORIF Sw spoke with [facility] rep she is aware they are waiting for bed at [facility] but it must be the dementia unit. DEREJE will need signed by physician.               Electronically signed by CHARLES Viera on 10/22/2024 at 1:52 PM        
SOCIAL WORK / DISCHARGE PLANNING:  Sw spoke with dtrs at bedside. They spoke with [facility] yesterday, no beds at [facility] [place]. Bedhold at [facility], N[facility] [phone], Fax [phone]. PRECERT would be needed prior to dc. Sitter at bedside is a concern as would not be considered at many SARs until was sitter free. Pt required dementia unit prior to fx. Elías spoke with [facility] who would need to further discuss with facility if sitter is not able to be discontinued. DEREJE will need signed by physician. PT/OT to see pt today.                 Electronically signed by CHARLES Viera on 10/23/2024 at 12:14 PM      
[phone]    [facility], [place] - [address] - P [phone] - F [phone]  [address]  [facility] [address]  Phone: [phone] Fax: [phone]      Notes:    Factors facilitating achievement of predicted outcomes: Family support    Barriers to discharge: Confusion    Additional Case Management Notes: Pt was admitted from [facility], [facility] [phone], Fax [phone], skilled. Per [facility] rep pt is a bedhold, will accept back and PRECERT will be needed prior to dc. ORIF rt femur scheduled for today. Will need to confirm dc plan with dtr, pt has dementia, recently admitted to [facility], previously at [facility]. DEREJE will need signed by physician. Sw will follow.       CHARLES Viera  Case Management Department  Ph:  Fax:

## 2024-10-28 NOTE — PROGRESS NOTES
10/28/24 1302   Encounter Summary   Encounter Overview/Reason Spiritual/Emotional Needs   Service Provided For Patient and family together   Referral/Consult From Rounding   Support System Family members;Children   Last Encounter  10/28/24  (DL)   Complexity of Encounter Moderate   Spiritual/Emotional needs   Type Spiritual Support   Assessment/Intervention/Outcome   Assessment Calm;Coping   Intervention Active listening;Nurtured Hope;Prayer (assurance of)/[place]   Outcome Comfort;Coping;Engaged in conversation;Expressed Gratitude     Family at bedside with Maureen. Prayer support provided.  available for additional spiritual support as needed.   Zoë Dominguez, [facility], [facility] Contact at Ext: [phone]

## 2024-10-28 NOTE — PROGRESS NOTES
OCCUPATIONAL THERAPY TREATMENT NOTE    [facility]   [address]        Date:10/28/2024  Patient Name: Maureen Collier  MRN: [id number]  : 1940  Room: [address] [place] [address]     Evaluating OT: Stanislav Sandra OTR/L; #[id number]      Referring Provider and Specific Provider Orders/Date:      10/22/24 1645   OT eval and treat  Start:  10/22/24 1645,   End:  10/22/24 1645,   ONE TIME,   Standing Count:  1 Occurrences,   R         Anson rCoss, DO       Placement Recommendation: Subacute Rehab        Diagnosis:   1. Closed displaced intertrochanteric fracture of right femur, initial encounter ([facility])    2. Intertrochanteric fracture of right femur, closed, initial encounter ([facility])         Surgery: RIGHT INTERTROCHANTERIC HIP OPEN REDUCTION INTERNAL FIXATION (Right: Hip)       Pertinent Medical History:       Past Medical History        Past Medical History:   Diagnosis Date    Arthritis      Cerebral artery occlusion with cerebral infarction (HCC)      Depression      DVT (deep vein thrombosis) in pregnancy      DVT of lower extremity, bilateral (HCC)      GERD (gastroesophageal reflux disease)      Hx of blood clots      Hyperlipidemia      Hypertension       cured    Nephrolithiasis              Past Surgical History         Past Surgical History:   Procedure Laterality Date    ABDOMEN SURGERY   ,      tummy tuck x2    BLADDER SUSPENSION       CHOLECYSTECTOMY, OPEN   3/21/2013     open cholecystectomy with intropertive cholangiogram    COLONOSCOPY        ENDOSCOPY, COLON, DIAGNOSTIC        FOOT SURGERY        GASTRIC BYPASS SURGERY        [facility]    HIP SURGERY Right 10/21/2024     RIGHT INTERTROCHANTERIC HIP OPEN REDUCTION INTERNAL FIXATION performed by ANETA Martinez DO at [facility] OR    HYSTERECTOMY (CERVIX STATUS UNKNOWN)   42 years ago    PA ARTHRP KNE CONDYLE&PLATU MEDIAL&LAT COMPARTMENTS N/A 2018     TOTAL

## 2024-10-28 NOTE — PLAN OF CARE
Problem: Chronic Conditions and Co-morbidities  Goal: Patient's chronic conditions and co-morbidity symptoms are monitored and maintained or improved  10/28/2024 0016 by Ana Hamm RN  Outcome: Progressing  10/27/2024 1044 by Mansi Dang RN  Outcome: Progressing     Problem: Discharge Planning  Goal: Discharge to home or other facility with appropriate resources  10/28/2024 0016 by Ana Hamm RN  Outcome: Progressing  10/27/2024 1044 by Mansi Dang RN  Outcome: Progressing     Problem: Skin/Tissue Integrity  Goal: Absence of new skin breakdown  Description: 1.  Monitor for areas of redness and/or skin breakdown  2.  Assess vascular access sites hourly  3.  Every 4-6 hours minimum:  Change oxygen saturation probe site  4.  Every 4-6 hours:  If on nasal continuous positive airway pressure, respiratory therapy assess nares and determine need for appliance change or resting period.  Outcome: Progressing     Problem: Safety - Adult  Goal: Free from fall injury  Outcome: Progressing     Problem: Pain  Goal: Verbalizes/displays adequate comfort level or baseline comfort level  Outcome: Progressing

## 2024-10-28 NOTE — PROGRESS NOTES
Department of Family Practice  Adult Daily Progress Note      SUBJECTIVE  KELECHI IS SEEN IN FOLLOW  UP ON [facility] FOR DR. SIMON.  SHE IS TOLERATING TREATMENT.  SHE IS READY FOR DISCHARGE.  FAMILY DID NOT WANT HER TO RETURN TO [facility].  THE  THERE HAS BEEN WORKING ON TRANSFER TO ANOTHER FACILITY FOR SOME TIME ALREADY WITHOUT SUCCESS.  THE FAMILY HAS APPEALED THE DISCHARGE FROM [facility].  CONTINUE TO AWAIT DISCHARGE APPEAL OUTCOME FROM [facility].         OBJECTIVE    Physical  VITALS:  BP (!) 110/54   Pulse 78   Temp 97.5 °F (36.4 °C) (Oral)   Resp 20   Ht 1.549 m (5' 0.98\")   Wt 59 kg (130 lb)   SpO2 96%   BMI 24.58 kg/m²   CONSTITUTIONAL:  awake, alert, cooperative, no apparent distress, and appears stated age  LUNGS:  No increased work of breathing, good air exchange, clear to auscultation bilaterally, no crackles or wheezing  CARDIOVASCULAR:  Normal apical impulse, regular rate and rhythm, normal S1 and S2, no S3 or S4, and no murmur noted  ABDOMEN:  No scars, normal bowel sounds, soft, non-distended, non-tender, no masses palpated, no hepatosplenomegally  MUSCULOSKELETAL:  DRESSING TO RIGHT HIP  Data  CBC with Differential:    Lab Results   Component Value Date/Time    WBC 9.1 10/23/2024 05:00 AM    RBC 3.31 10/23/2024 05:00 AM    HGB 10.3 10/23/2024 05:00 AM    HCT 32.6 10/23/2024 05:00 AM     10/23/2024 05:00 AM    MCV 98.5 10/23/2024 05:00 AM    MCH 31.1 10/23/2024 05:00 AM    MCHC 31.6 10/23/2024 05:00 AM    RDW 13.9 10/23/2024 05:00 AM    LYMPHOPCT 20 10/23/2024 05:00 AM    MONOPCT 8 10/23/2024 05:00 AM    EOSPCT 0 10/23/2024 05:00 AM    BASOPCT 0 10/23/2024 05:00 AM    MONOSABS 0.71 10/23/2024 05:00 AM    LYMPHSABS 1.81 10/23/2024 05:00 AM    EOSABS 0.02 10/23/2024 05:00 AM    BASOSABS 0.01 10/23/2024 05:00 AM     BMP:    Lab Results   Component Value Date/Time     10/23/2024 05:00 AM    K 4.1 10/23/2024 05:00 AM    K 4.0 05/12/2023 04:25 AM     10/23/2024 05:00 AM

## 2024-10-29 NOTE — PROGRESS NOTES
Physician Progress Note      PATIENT:               KELECHI CRUZ  [facility] #:                  [id number]  :                       1940  ADMIT DATE:       10/20/2024 5:10 PM  DISCH DATE:        10/28/2024 5:55 PM  RESPONDING  PROVIDER #:        Sneha Victor DO          QUERY TEXT:    Pt admitted with Displaced intertrochanteric fracture of right femur. Pt noted   to have endured a fall, also osteopenia on imaging. If possible, please   document in progress notes and discharge summary if you are evaluating and/or   treating any of the following:    The medical record reflects the following:  Risk Factors: fall at nursing facility. osteopenia on imaging  Clinical Indicators: Per notes, presents to the emergency department with   chief complaint of fall at nursing facility.  Displaced intertrochanteric   fracture of right femur. XR R femur, R Pelvis: Osteopenia.  Treatment: imaging, ORIF  Options provided:  -- Pathological right femur fracture due to osteopenia following fall which   would not usually break a normal, healthy bone  -- Traumatic right femur fracture  -- Other - I will add my own diagnosis  -- Disagree - Not applicable / Not valid  -- Disagree - Clinically unable to determine / Unknown  -- Refer to Clinical Documentation Reviewer    PROVIDER RESPONSE TEXT:    Pathological right femur fracture due to osteopenia following fall which would   not usually break a normal, healthy bone    Query created by: Marta Drake on 10/23/2024 7:52 AM      QUERY TEXT:    Pt with known dementia. Nursing noting patient to be agitated, requiring   Haldol and a sitter. If possible, please document in the progress notes and   discharge summary if you are evaluating and / or treating any of the   following:    The medical record reflects the following:  Risk Factors: dementia  Clinical Indicators: Per H&P: Increased dementia. 10/22 noted by ortho   \"Nursing states that she is been given Ativan for control of her

## 2024-10-30 NOTE — PROGRESS NOTES
Physician Progress Note      PATIENT:               KELECHI CRUZ  CSN #:                  [id number]  :                       1940  ADMIT DATE:       10/20/2024 5:10 PM  DISCH DATE:        10/28/2024 5:55 PM  RESPONDING  PROVIDER #:        Vikas Arango DO          QUERY TEXT:    Per ED note, pt underwent laceration repair. To accurately reflect the   procedure performed please document the deepest depth of laceration which was   repaired:    The medical record reflects the following:  Risk Factors: posterior scalp lac  Clinical Indicators: ED note: Laceration Repair Procedure: Location: posterior   scalp. The wound was explored with the following results no foreign body or   tendon injury seen. The wound was closed with staples.  Treatment: Closed with 2 staples, guaze  Options provided:  -- Laceration repair of skin  -- Laceration repair of subcutaneous tissue  -- Laceration repair of fascia  -- Other - I will add my own diagnosis  -- Disagree - Not applicable / Not valid  -- Disagree - Clinically unable to determine / Unknown  -- Refer to Clinical Documentation Reviewer    PROVIDER RESPONSE TEXT:    Laceration repair of skin    Query created by: Marta Drake on 10/23/2024 7:46 AM      Electronically signed by:  Vikas Arango DO 10/30/2024 11:58 AM

## 2024-11-13 ENCOUNTER — OFFICE VISIT (OUTPATIENT)
Dept: ORTHOPEDIC SURGERY | Age: 84
End: 2024-11-13

## 2024-11-13 VITALS
WEIGHT: 111 LBS | TEMPERATURE: 98.6 F | SYSTOLIC BLOOD PRESSURE: 114 MMHG | HEIGHT: 60 IN | BODY MASS INDEX: 21.79 KG/M2 | DIASTOLIC BLOOD PRESSURE: 79 MMHG | HEART RATE: 85 BPM

## 2024-11-13 DIAGNOSIS — S72.141A INTERTROCHANTERIC FRACTURE OF RIGHT FEMUR, CLOSED, INITIAL ENCOUNTER (HCC): Primary | ICD-10-CM

## 2024-11-13 PROCEDURE — 99024 POSTOP FOLLOW-UP VISIT: CPT | Performed by: ORTHOPAEDIC SURGERY

## 2024-11-13 NOTE — PROGRESS NOTES
Chief Complaint:   Chief Complaint   Patient presents with    Follow-up     Post op Right hip. Patient states she is in some pain today. Daughter reports patient has a bruise on her right knee that is also causing some pain. Pain is described as sharp.        Maureen Collier follows up 3 weeks postop fixation of right intertrochanteric fracture with long cephalomedullary nail.  Her daughter brings her in in a wheelchair.  She is complaining of pain around her knee and her daughters noticed a bruise.  She has been in skilled care but is transitioning to long-term care because of failure to progress with PT.  She apparently has stood but activity level is difficult to ascertain.      Allergies; medications; past medical, surgical, family, and social history; and problem list have been reviewed today and updated as indicated in this encounter seen below.    Exam: There is a small bruise in the front of the right knee.  There is no gross effusion in the knee.  There is no sign of significant injury in the knee area.  There is Erin are out of the distal locking screw wound which looks fine.  Gentle range of motion of the hip was done as well as the knee and limited excursion was done because of her crying out.  There is no instability.  Alignment is good all appear stable at this point in time.    Radiographs: Intraoperative radiographs showed anatomic alignment of the proximal femur.  The lock construct was stable in position.    ASSESSMENT:    Maureen was seen today for follow-up.    Diagnoses and all orders for this visit:    Intertrochanteric fracture of right femur, closed, initial encounter ([facility])        PLAN: Mrs. Collier will be allowed weightbearing to progress as tolerated right lower extremity.  It is unclear whether this would allow her coverage for more therapy.  She needs to participate more to improve her strength stability and endurance.    Return in about 3 weeks (around 12/4/2024) for R femur X.

## (undated) DEVICE — SYRINGE MED 50ML LUERLOCK TIP

## (undated) DEVICE — NDL CNTR 40CT FM MAG: Brand: MEDLINE INDUSTRIES, INC.

## (undated) DEVICE — READY WET SKIN SCRUB TRAY-LF: Brand: MEDLINE INDUSTRIES, INC.

## (undated) DEVICE — GOWN,SIRUS,POLYRNF,RAGLAN,XL,ST,30/CS: Brand: MEDLINE

## (undated) DEVICE — MASTISOL ADHESIVE LIQ 2/3ML

## (undated) DEVICE — BASIC DOUBLE BASIN 2-LF: Brand: MEDLINE INDUSTRIES, INC.

## (undated) DEVICE — MARKER,SKIN,WI/RULER AND LABELS: Brand: MEDLINE

## (undated) DEVICE — GOWN,SIRUS,POLYRNF,BRTHSLV,XLN/XL,20/CS: Brand: MEDLINE

## (undated) DEVICE — MEDI-VAC YANKAUER SUCTION HANDLE: Brand: CARDINAL HEALTH

## (undated) DEVICE — APPLICATOR MEDICATED 26 CC SOLUTION HI LT ORNG CHLORAPREP

## (undated) DEVICE — PACK,EXTREMITY,ORTHOMAX,5/CS: Brand: MEDLINE

## (undated) DEVICE — Device

## (undated) DEVICE — BLADE,STAINLESS-STEEL,10,STRL,DISPOSABLE: Brand: MEDLINE

## (undated) DEVICE — PAD,ABDOMINAL,5"X9",ST,LF,25/BX: Brand: MEDLINE INDUSTRIES, INC.

## (undated) DEVICE — TUBING, SUCTION, 1/4" X 10', STRAIGHT: Brand: MEDLINE

## (undated) DEVICE — K-WIRE

## (undated) DEVICE — 6617 IOBAN II PATIENT ISOLATION DRAPE 5/BX,4BX/CS: Brand: STERI-DRAPE™ IOBAN™ 2

## (undated) DEVICE — PACK,BASIC I: Brand: MEDLINE

## (undated) DEVICE — PAD,ABDOMINAL,8"X10",ST,LF: Brand: MEDLINE

## (undated) DEVICE — GOWN,SIRUS,FABRNF,XL,20/CS: Brand: MEDLINE

## (undated) DEVICE — 4-PORT MANIFOLD: Brand: NEPTUNE 2

## (undated) DEVICE — COVER,TABLE,60X90,STERILE: Brand: MEDLINE

## (undated) DEVICE — GAUZE,SPONGE,4"X4",16PLY,STRL,LF,10/TRAY: Brand: MEDLINE

## (undated) DEVICE — BANDAGE COMPR W6INXL12FT SMOOTH FOR LIMB EXSANG ESMARCH

## (undated) DEVICE — FREEHAND DRILL

## (undated) DEVICE — PADDING,UNDERCAST,COTTON, 4"X4YD STERILE: Brand: MEDLINE

## (undated) DEVICE — Z DISCONTINUED NO SUB IDED GLOVE SURG BEAD CUF 8 STD PF WHT STRL TRIUMPH LT LTX

## (undated) DEVICE — WIPES SKIN CLOTH READYPREP 9 X 10.5 IN 2% CHLORHEX GLUCONATE CHG PREOP

## (undated) DEVICE — VASELINE PETROLATUM GAUZE STRIP: Brand: VASELINE

## (undated) DEVICE — NEEDLE FLTR 18GA L1.5IN MEM THK5UM BLNT DISP

## (undated) DEVICE — 3M™ STERI-DRAPE™ U-DRAPE 1015: Brand: STERI-DRAPE™

## (undated) DEVICE — GLOVE ORTHO 8   MSG9480

## (undated) DEVICE — SHEET DRAPE FULL 70X100

## (undated) DEVICE — GUIDE WIRE, BALL-TIPPED, STERILE

## (undated) DEVICE — DOUBLE BASIN SET: Brand: MEDLINE INDUSTRIES, INC.

## (undated) DEVICE — GRADUATE

## (undated) DEVICE — REAMER SHAFT, MOD.TRINKLE: Brand: BIXCUT

## (undated) DEVICE — BANDAGE COMPR W6INXL5YD SELF ADH COHESIVE CO FLX

## (undated) DEVICE — CANNULA IV 18GA L15IN BLNT FILL LUERLOCK HUB MJCT

## (undated) DEVICE — GLOVE ORANGE PI 8   MSG9080

## (undated) DEVICE — SOLUTION IRRIG 1000ML 0.9% SOD CHL USP POUR PLAS BTL

## (undated) DEVICE — SYRINGE IRRIG 60ML SFT PLIABLE BLB EZ TO GRP 1 HND USE W/

## (undated) DEVICE — BANDAGE COMPR L W4INXL11YD 100% COT WVN E DBL LEN CLP CLSR

## (undated) DEVICE — INTENDED FOR TISSUE SEPARATION, AND OTHER PROCEDURES THAT REQUIRE A SHARP SURGICAL BLADE TO PUNCTURE OR CUT.: Brand: BARD-PARKER ® STAINLESS STEEL BLADES

## (undated) DEVICE — SOLUTION IV IRRIG POUR BRL 0.9% SODIUM CHL 2F7124

## (undated) DEVICE — TOWEL,OR,DSP,ST,BLUE,STD,6/PK,12PK/CS: Brand: MEDLINE

## (undated) DEVICE — BANDAGE COMPR W4INXL5YD WHT BGE POLY COT M E WRP WV HK AND

## (undated) DEVICE — SPONGE,LAP,12"X12",XR,ST,5/PK,40PK/CS: Brand: MEDLINE

## (undated) DEVICE — STRIP,CLOSURE,WOUND,MEDI-STRIP,1/2X4: Brand: MEDLINE

## (undated) DEVICE — PRECISION PIN TAPERED: Brand: GAMMA

## (undated) DEVICE — SET MAJOR INSTR ORTHO

## (undated) DEVICE — PENCIL ES L3M BTTN SWCH HOLSTER W/ BLDE ELECTRD EDGE

## (undated) DEVICE — COVER,LIGHT HANDLE,FLX,1/PK: Brand: MEDLINE INDUSTRIES, INC.

## (undated) DEVICE — SET EXTN L14IN 1ML IV L BOR NO FLTR NO STPCOCK

## (undated) DEVICE — SHEET SUPPORT

## (undated) DEVICE — Z DISCONTINUED USE 2275686 GLOVE SURG SZ 8 L12IN FNGR THK13MIL WHT ISOLEX POLYISOPRENE

## (undated) DEVICE — Z DISCONTINUED PER MEDLINE USE 2741944 DRESSING AQUACEL 12 IN SURG W9XL30CM SIL CVR WTRPRF VIR BACT BARR ANTIMIC

## (undated) DEVICE — ELECTRODE PT RET AD L9FT HI MOIST COND ADH HYDRGEL CORDED

## (undated) DEVICE — COVER,TABLE,44X90,STERILE: Brand: MEDLINE

## (undated) DEVICE — SYRINGE MED 30ML STD CLR PLAS LUERLOCK TIP N CTRL DISP

## (undated) DEVICE — GARMENT,MEDLINE,DVT,INT,CALF,MED, GEN2: Brand: MEDLINE

## (undated) DEVICE — STRYKER PERFORMANCE SERIES SAGITTAL BLADE: Brand: STRYKER PERFORMANCE SERIES

## (undated) DEVICE — BANDAGE NL COFLEX 4INX5YD: Brand: MEDLINE INDUSTRIES, INC.

## (undated) DEVICE — STANDARD HYPODERMIC NEEDLE,POLYPROPYLENE HUB: Brand: MONOJECT

## (undated) DEVICE — SPONGE LAP W18XL18IN WHT COT 4 PLY FLD STRUNG RADPQ DISP ST 2 PER PACK